# Patient Record
Sex: MALE | Race: WHITE | NOT HISPANIC OR LATINO | Employment: UNEMPLOYED | URBAN - METROPOLITAN AREA
[De-identification: names, ages, dates, MRNs, and addresses within clinical notes are randomized per-mention and may not be internally consistent; named-entity substitution may affect disease eponyms.]

---

## 2017-01-30 DIAGNOSIS — I72.8 CELIAC ARTERY ANEURYSM (HCC): ICD-10-CM

## 2017-01-31 ENCOUNTER — APPOINTMENT (OUTPATIENT)
Dept: MEDICAL GROUP | Age: 57
End: 2017-01-31
Payer: COMMERCIAL

## 2017-02-07 ENCOUNTER — APPOINTMENT (OUTPATIENT)
Dept: RADIOLOGY | Facility: MEDICAL CENTER | Age: 57
End: 2017-02-07
Attending: FAMILY MEDICINE
Payer: COMMERCIAL

## 2017-02-15 ENCOUNTER — SLEEP CENTER VISIT (OUTPATIENT)
Dept: SLEEP MEDICINE | Facility: MEDICAL CENTER | Age: 57
End: 2017-02-15
Payer: COMMERCIAL

## 2017-02-15 VITALS
BODY MASS INDEX: 39.48 KG/M2 | RESPIRATION RATE: 16 BRPM | DIASTOLIC BLOOD PRESSURE: 80 MMHG | WEIGHT: 282 LBS | SYSTOLIC BLOOD PRESSURE: 116 MMHG | HEIGHT: 71 IN | OXYGEN SATURATION: 94 % | HEART RATE: 72 BPM

## 2017-02-15 DIAGNOSIS — G47.33 OSA (OBSTRUCTIVE SLEEP APNEA): ICD-10-CM

## 2017-02-15 DIAGNOSIS — R63.8 INCREASED BMI: ICD-10-CM

## 2017-02-15 DIAGNOSIS — I10 ESSENTIAL HYPERTENSION: ICD-10-CM

## 2017-02-15 PROCEDURE — 99244 OFF/OP CNSLTJ NEW/EST MOD 40: CPT | Performed by: INTERNAL MEDICINE

## 2017-02-15 RX ORDER — ZOLPIDEM TARTRATE 5 MG/1
TABLET ORAL
Qty: 3 TAB | Refills: 0 | Status: SHIPPED | OUTPATIENT
Start: 2017-02-15 | End: 2017-07-21

## 2017-02-15 NOTE — MR AVS SNAPSHOT
"        Jhonatan Fonseca   2/15/2017 11:40 AM   Sleep Center Visit   MRN: [id number]    Department:  [facility]   Dept Phone:  [phone]    Description:  Male : 1960   Provider:  Elie Dunn M.D.           Allergies as of 2/15/2017     Allergen Noted Reactions    Lisinopril 2016   Hives      You were diagnosed with     DK (obstructive sleep apnea)   [969084]       Increased BMI   [420325]       Essential hypertension   [5510495]         Vital Signs     Blood Pressure Pulse Respirations Height Weight Body Mass Index    116/80 mmHg 72 16 1.791 m (5' 10.51\") 127.914 kg (282 lb) 39.88 kg/m2    Oxygen Saturation Smoking Status                94% Never Smoker           Basic Information     Date Of Birth Sex Race Ethnicity Preferred Language    1960 Male Unable to Obtain Unknown English      Your appointments     2017  3:30 PM   MR RICHTER WITH/WO with ROLON MRI 1   IMAGING ROLON [facility])    [id number] [id number]   [phone]            Mar 31, 2017  8:10 PM   Sleep Study Diagnostic with SLEEP TECH   [facility] (--)    [address]  [employer] [place] [address] [phone]            2017 11:40 AM   Follow UP with AKOSUA Wayne   [facility] (--)    [address]  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      Below and/or attached are the medications your provider expects you to take. Review all of your home medications and newly ordered medications with your provider and/or pharmacist. Follow medication instructions as directed by your provider and/or pharmacist. Please keep your medication list with you and share with your provider. Update the information when medications are discontinued, doses are changed, or new medications (including over-the-counter products) are added; and carry medication information at all times in the event of emergency situations     Allergies:  LISINOPRIL - Hives               Medications  Valid as of: February 15, 2017 - 12:15 PM    Generic Name Brand Name Tablet Size Instructions for use    Allopurinol (Tab) ZYLOPRIM 300 MG Take 300 mg by mouth every day.        Allopurinol (Tab) ZYLOPRIM 300 MG TAKE 1 TABLET BY MOUTH  EVERY DAY        AmLODIPine Besylate (Tab) NORVASC 5 MG Take 5 mg by mouth every day.        AmLODIPine Besylate (Tab) NORVASC 5 MG TAKE 1 TABLET BY MOUTH  EVERY DAY .        Aspirin (Tablet Delayed Response) ECOTRIN 81 MG Take 81 mg by mouth every day.        Aspirin (Tablet Delayed Response) aspirin 81 MG Take 81 mg by mouth every day.        Atorvastatin Calcium (Tab) LIPITOR 20 MG Take 20 mg by mouth every evening.        Bisoprolol Fumarate (Tab) ZEBETA 5 MG Take 5 mg by mouth every day.        Bisoprolol Fumarate (Tab) ZEBETA 5 MG Take 1 tablet by mouth  every day        Fluticasone Propionate (Suspension) FLONASE 50 MCG/ACT Spray 50 Sprays in nose as needed.        Multiple Vitamins-Minerals (Tab) THERAGRAN-M  Take 1 Tab by mouth every day.        Omega-3 Fatty Acids (Cap) fish oil 1000 MG Take 1,000 mg by mouth 3 times a day, with meals.        Oxycodone-Acetaminophen (Tab)  PERCOCET 5-325 MG Take 1-2 Tabs by mouth every four hours as needed.        Zolpidem Tartrate (Tab) AMBIEN 5 MG Take 1-2 tablets by mouth every evening as needed for insomnia. Bring to sleep study.        .                 Medicines prescribed today were sent to:     [facility] - [address]    [address] [facility] [address] [facility] [address]    Phone: [phone] Fax: [phone]    Open 24 Hours?: No    [id number] [place], [place] - [address]    [address] [place] [address]    Phone: [phone] Fax: [phone]    Open 24 Hours?: No      Medication refill instructions:       If your prescription bottle indicates you have medication refills left, it is not necessary to call your provider’s office. Please contact your pharmacy and they will refill your medication.    If your prescription bottle indicates you do not have any refills left, you may request refills at any time through one of the following ways: The online [employer] system (except Urgent Care), by calling your provider’s office, or by asking your pharmacy to contact your provider’s office with a refill request. Medication refills are processed only during regular business hours and may not be available until the next business day. Your provider may request additional information or to have a follow-up visit with you prior to refilling your medication.   *Please Note: Medication refills are assigned a new Rx number when refilled electronically. Your pharmacy may indicate that no refills were authorized even though a new prescription for the same medication is available at the pharmacy. Please request the medicine by name with the pharmacy before contacting your provider for a refill.        Your To Do List     Future Labs/Procedures Complete By Expires    POLYSOMNOGRAPHY, 4 OR MORE  As directed 2/15/2018         N[employer] Access Code: Activation code not generated  Current [employer]ality Status:  Active

## 2017-02-15 NOTE — PROGRESS NOTES
CC: Reevaluation of sleep apnea     HPI:  56-year-old male kindly referred by  for re-evaluation of DK. Has a home sleep study done in [place] 5 years ago which showed and ahi of > 50. Reportedly, couldn't afford the cpap and never used it according to accompanying records.  Moved here in May and needs to get re-established.    Sx include snoring, sleeps separately from wife and sleeps in a recliner and does better than in bed, nocturia, gasping, apneas, tiredness, eds, and fatigue.              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   • Bilateral hearing loss 09/19/2016   • BMI 39.0-39.9,adult 09/19/2016       Past Medical History   Diagnosis Date   • Celiac artery aneurysm (CMS-HCC)    • Sleep apnea    • Hypertension    • Kidney stone    • Chickenpox         Past Surgical History   Procedure Laterality Date   • Lithotripsy     • Knee arthroscopy       repair micscus tear       Family History   Problem Relation Age of Onset   • Cancer Brother      parathyroid   • Diabetes Brother        Social History     Social History   • Marital Status:      Spouse Name: N/A   • Number of Children: N/A   • Years of Education: N/A     Occupational History   • Not on file.     Social History Main Topics   • Smoking status: Never Smoker    • Smokeless tobacco: Never Used   • Alcohol Use: 0.0 oz/week     0 Standard drinks or equivalent per week      Comment: very occasionally   • Drug Use: No   • Sexual Activity:     Partners: Female     Other Topics Concern   • Not on file     Social History Narrative       Current Outpatient Prescriptions   Medication Sig Dispense Refill   • amlodipine (NORVASC) 5 MG Tab TAKE 1 TABLET BY MOUTH  EVERY DAY . 90 Tab 0   •  "allopurinol (ZYLOPRIM) 300 MG Tab TAKE 1 TABLET BY MOUTH  EVERY DAY 90 Tab 0   • bisoprolol (ZEBETA) 5 MG Tab Take 1 tablet by mouth  every day 90 Tab 0   • fluticasone (FLONASE) 50 MCG/ACT nasal spray Spray 50 Sprays in nose as needed.     • aspirin (ASPIR-LOW) 81 MG EC tablet Take 81 mg by mouth every day.     • Omega-3 Fatty Acids (FISH OIL) 1000 MG Cap capsule Take 1,000 mg by mouth 3 times a day, with meals.     • therapeutic multivitamin-minerals (THERAGRAN-M) Tab Take 1 Tab by mouth every day.     • atorvastatin (LIPITOR) 20 MG Tab Take 20 mg by mouth every evening.     • oxycodone-acetaminophen (PERCOCET) 5-325 MG Tab Take 1-2 Tabs by mouth every four hours as needed.     • aspirin EC (ECOTRIN) 81 MG Tablet Delayed Response Take 81 mg by mouth every day.     • allopurinol (ZYLOPRIM) 300 MG Tab Take 300 mg by mouth every day.     • amlodipine (NORVASC) 5 MG Tab Take 5 mg by mouth every day.     • bisoprolol (ZEBETA) 5 MG Tab Take 5 mg by mouth every day.       No current facility-administered medications for this visit.    \"CURRENT RX\"    ALLERGIES: Lisinopril    ROS  Constitutional: Denies fever, chills, sweats, fatigue, weight loss.   Eyes: Denies  vision loss, pain, drainage, double vision.   Ears/Nose/Mouth/Throat: Denies earache, difficulty hearing, ringing/buzzing in ears, rhinitis/nasal congestion, injury, recurrent sore throat, persistent hoarseness, decayed teeth/toothaches.   Cardiovascular: Denies chest pain, tightness, palpitations, swelling in legs/feet, fainting, difficulty breathing when lying down but gets better when sitting up.   Respiratory: Denies shortness of breath, cough, sputum, wheezing, painful breathing, coughing up blood.   Sleep: see HPI  Gastrointestinal: Denies heartburn, difficulty swallowing, nausea, abdominal pain, diarrhea, constipation.   Genitourinary: Denies frequent urination, painful urination, blood in urine, discharge.   Musculoskeletal: Denies painful joints, sore " "muscles, back pain.   Integumentary: Denies rashes, lumps, color changes.   Neurological: Denies frequent headaches, dizziness, weakness    PHYSICAL EXAM  MSEW   /80 mmHg  Pulse 72  Resp 16  Ht 1.791 m (5' 10.51\")  Wt 127.914 kg (282 lb)  BMI 39.88 kg/m2  SpO2 94%  Appearance: Well-nourished, well-developed, no acute distress  Eyes:  PERRLA, EOMI; glasses  Hearing:  Grossly intact  Nose:  Normal, no lesions or deformities, turbinates moist  Oropharynx:  Tongue normal, posterior pharynx without erythema or exudate  Mallampati classification:    Neck: Supple, trachea midline, no masses  Respiratory effort:  No intercostal retractions or use of accessory muscles  Lung auscultation:  No wheezes rhonchi rubs or rales  Cardiac auscultation:  No murmurs, rubs, or gallops, no regular rhythm, normal rate  Abdomen:  No tenderness, no organomegaly  Extremities:  No cyanosis, clubbing; 2+ edema  Gait and Station:  Normal  Digits and nails: No clubbing, cyanosis, petechiae, or nodes  Musculoskeletal:  Grossly normal  Skin:  No rashes  Orientation:  Oriented time, place, and person  Mood and affect:  No depression, anxiety, agitation  Judgment:  Intact    PROBLEMS:  1. DK (obstructive sleep apnea)    - POLYSOMNOGRAPHY, 4 OR MORE; Future  - zolpidem (AMBIEN) 5 MG Tab; Take 1-2 tablets by mouth every evening as needed for insomnia. Bring to sleep study.  Dispense: 3 Tab; Refill: 0    2. Increased BMI      3. Essential hypertension        PLAN:   The patient has signs and symptoms consistent with obstructive sleep apnea hypopnea syndrome. A prior study showed significant DK. He needs to be re-qualified. Will schedule to have a nocturnal polysomnogram using zolpidem to assist with sleep onset and maintenance should the need arise. Will return after the results are available to determine further diagnostic needs and/or treatment options.    The risks of untreated sleep apnea were discussed with the patient at length. " Patients with DK are at increased risk of cardiovascular disease including coronary artery disease, systemic arterial hypertension, pulmonary arterial hypertension, cardiac arrythmias, and stroke. DK patients have an increased risk of motor vehicle accidents, type 2 diabetes, chronic kidney disease, and non-alcoholic liver disease. The patient was advised to avoid driving a motor vehicle when drowsy.       RTC after PSG.

## 2017-02-22 ENCOUNTER — PATIENT MESSAGE (OUTPATIENT)
Dept: MEDICAL GROUP | Age: 57
End: 2017-02-22

## 2017-02-22 RX ORDER — FLUTICASONE PROPIONATE 50 MCG
50 SPRAY, SUSPENSION (ML) NASAL PRN
Qty: 16 G | Refills: 0 | Status: SHIPPED | OUTPATIENT
Start: 2017-02-22 | End: 2018-10-29

## 2017-02-22 RX ORDER — FLUTICASONE PROPIONATE 50 MCG
1 SPRAY, SUSPENSION (ML) NASAL DAILY
Qty: 16 G | Refills: 2 | Status: SHIPPED | OUTPATIENT
Start: 2017-02-22 | End: 2017-11-17 | Stop reason: SDUPTHER

## 2017-03-29 ENCOUNTER — TELEPHONE (OUTPATIENT)
Dept: SLEEP MEDICINE | Facility: MEDICAL CENTER | Age: 57
End: 2017-03-29

## 2017-03-29 DIAGNOSIS — G47.33 OSA (OBSTRUCTIVE SLEEP APNEA): ICD-10-CM

## 2017-03-29 NOTE — TELEPHONE ENCOUNTER
In lab study denied, schedule peer to peer or would you like to sign HST order?? Let mw know and I'll call pt. Thanks!!    MRN [id number]/ Jhonatan Fonseca/ [facility] has denied in lab sleep study.  Would you like to switch to a HST or do a Peer to Peer Review    Peer to Peer [phone]  Ref # [id number]

## 2017-04-04 RX ORDER — BISOPROLOL FUMARATE 5 MG/1
TABLET, FILM COATED ORAL
Qty: 90 TAB | Refills: 0 | Status: SHIPPED | OUTPATIENT
Start: 2017-04-04 | End: 2017-08-20 | Stop reason: SDUPTHER

## 2017-04-04 RX ORDER — ATORVASTATIN CALCIUM 20 MG/1
TABLET, FILM COATED ORAL
Qty: 90 TAB | Refills: 0 | Status: SHIPPED | OUTPATIENT
Start: 2017-04-04 | End: 2018-11-26

## 2017-04-05 RX ORDER — AMLODIPINE BESYLATE 5 MG/1
TABLET ORAL
Qty: 90 TAB | Refills: 0 | OUTPATIENT
Start: 2017-04-05

## 2017-04-05 RX ORDER — ALLOPURINOL 300 MG/1
TABLET ORAL
Qty: 90 TAB | Refills: 0 | OUTPATIENT
Start: 2017-04-05

## 2017-04-07 ENCOUNTER — TELEPHONE (OUTPATIENT)
Dept: MEDICAL GROUP | Age: 57
End: 2017-04-07

## 2017-04-07 DIAGNOSIS — L29.9 ITCHY SKIN: ICD-10-CM

## 2017-04-07 NOTE — TELEPHONE ENCOUNTER
1. Caller Name: Jhonatan Fonseca                                          Call Back Number: [phone] (home) [phone] (work)      Patient approves a detailed voicemail message: yes    2. SPECIFIC Action To Be Taken: Referral pending, please sign.    3. Diagnosis/Clinical Reason for Request: moles and itchy skin around shoulders and waist.    4. Specialty & Provider Name/Lab/Imaging Location: NA    5. Is appointment scheduled for requested order/referral: no    Patient informed they will receive a return phone call from the office ONLY if there are any questions before processing their request. Advised to call back if they haven't received a call from the referral department in 5 days.

## 2017-04-07 NOTE — TELEPHONE ENCOUNTER
----- Message from Your Healthcare Team sent at 4/7/2017  8:20 AM PDT -----  Regarding: Non-Urgent Medical Question  Contact: [phone]  Alfredo Newell    1. Can you please approve my RX renewals, preferably for at least a year or longer;    2. Can I get a referral to a Dermatologist to look at some skin irritations and wagner?    Thanks!    Alan Fonseca

## 2017-04-10 RX ORDER — ALLOPURINOL 300 MG/1
TABLET ORAL
Qty: 90 TAB | Refills: 0 | Status: SHIPPED | OUTPATIENT
Start: 2017-04-10 | End: 2017-06-27 | Stop reason: SDUPTHER

## 2017-04-10 RX ORDER — ATORVASTATIN CALCIUM 20 MG/1
TABLET, FILM COATED ORAL
OUTPATIENT
Start: 2017-04-10

## 2017-04-10 RX ORDER — AMLODIPINE BESYLATE 5 MG/1
TABLET ORAL
Qty: 90 TAB | Refills: 0 | Status: SHIPPED | OUTPATIENT
Start: 2017-04-10 | End: 2017-06-27 | Stop reason: SDUPTHER

## 2017-04-10 NOTE — TELEPHONE ENCOUNTER
Phone Number Called: [phone] (home) [phone] (work)    Message: Spoke with patient and relayed message.    Left Message for patient to call back: N\A

## 2017-05-01 ENCOUNTER — HOME STUDY (OUTPATIENT)
Dept: SLEEP MEDICINE | Facility: MEDICAL CENTER | Age: 57
End: 2017-05-01
Attending: NURSE PRACTITIONER
Payer: COMMERCIAL

## 2017-05-01 DIAGNOSIS — G47.33 OSA (OBSTRUCTIVE SLEEP APNEA): ICD-10-CM

## 2017-05-01 PROCEDURE — 95806 SLEEP STUDY UNATT&RESP EFFT: CPT | Performed by: INTERNAL MEDICINE

## 2017-05-01 NOTE — MR AVS SNAPSHOT
Jhonatan Fonseca   2017 2:10 PM   Appointment   MRN: [id number]    Department:  [facility]   Dept Phone:  [phone]    Description:  Male : 1960   Provider:  [facility]           Allergies as of 2017     Allergen Noted Reactions    Lisinopril 2016   Hives      You were diagnosed with     DK (obstructive sleep apnea)   [856064]         Vital Signs     Smoking Status                   Never Smoker            Basic Information     Date Of Birth Sex Race Ethnicity Preferred Language    1960 Male Unable to Obtain Unknown English      Your appointments     May 15, 2017 11:40 AM   Follow UP with AKOSUA Wayne   [facility] (--)    [address]  [place] [id number] [phone]              Problem List              ICD-10-CM Priority Class Noted - Resolved    Essential hypertension I10   2016 - Present    DK (obstructive sleep apnea) G47.33   2016 - Present    Pure hypercholesterolemia E78.00   2016 - Present    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      Below and/or attached are the medications your provider expects you to take. Review all of your home medications and newly ordered medications with your provider and/or pharmacist. Follow medication instructions as directed by your provider and/or pharmacist. Please keep your medication list with you and share with your provider. Update the information when medications are discontinued, doses are changed, or new medications  (including over-the-counter products) are added; and carry medication information at all times in the event of emergency situations     Allergies:  LISINOPRIL - Hives               Medications  Valid as of: May 01, 2017 -  2:19 PM    Generic Name Brand Name Tablet Size Instructions for use    Allopurinol (Tab) ZYLOPRIM 300 MG Take 300 mg by mouth every day.        Allopurinol (Tab) ZYLOPRIM 300 MG TAKE 1 TABLET BY MOUTH  EVERY DAY        Allopurinol (Tab) ZYLOPRIM 300 MG Take 1 tablet by mouth  every day        Allopurinol (Tab) ZYLOPRIM 300 MG Take 1 tablet by mouth  every day        AmLODIPine Besylate (Tab) NORVASC 5 MG Take 5 mg by mouth every day.        AmLODIPine Besylate (Tab) NORVASC 5 MG TAKE 1 TABLET BY MOUTH  EVERY DAY .        AmLODIPine Besylate (Tab) NORVASC 5 MG Take 1 tablet by mouth  every day        AmLODIPine Besylate (Tab) NORVASC 5 MG Take 1 tablet by mouth  every day        Aspirin (Tablet Delayed Response) ECOTRIN 81 MG Take 81 mg by mouth every day.        Atorvastatin Calcium (Tab) LIPITOR 20 MG Take 20 mg by mouth every evening.        Atorvastatin Calcium (Tab) LIPITOR 20 MG TAKE 1 TAB BY MOUTH EVERY  DAY        Bisoprolol Fumarate (Tab) ZEBETA 5 MG Take 5 mg by mouth every day.        Bisoprolol Fumarate (Tab) ZEBETA 5 MG Take 1 tablet by mouth  every day        Fluticasone Propionate (Suspension) FLONASE 50 MCG/ACT Spray 50 Sprays in nose as needed.        Fluticasone Propionate (Suspension) FLONASE 50 MCG/ACT Spray 1 Spray in nose every day.        Multiple Vitamins-Minerals (Tab) THERAGRAN-M  Take 1 Tab by mouth every day.        Omega-3 Fatty Acids (Cap) fish oil 1000 MG Take 1,000 mg by mouth 3 times a day, with meals.        Oxycodone-Acetaminophen (Tab) PERCOCET 5-325 MG Take 1-2 Tabs by mouth every four hours as needed.        Zolpidem Tartrate (Tab) AMBIEN 5 MG Take 1-2 tablets by mouth every evening as needed for insomnia. Bring to sleep study.        .                    Medicines prescribed today were sent to:     [facility] - [address]    [address] [facility] [address] [facility] [address]    Phone: [phone] Fax: [phone]    Open 24 Hours?: No    [id number] - [place], [place] - [address]    [address] [place] [address]    Phone: [phone] Fax: [phone]    Open 24 Hours?: No      Medication refill instructions:       If your prescription bottle indicates you have medication refills left, it is not necessary to call your provider’s office. Please contact your pharmacy and they will refill your medication.    If your prescription bottle indicates you do not have any refills left, you may request refills at any time through one of the following ways: The online [employer] system (except Urgent Care), by calling your provider’s office, or by asking your pharmacy to contact your provider’s office with a refill request. Medication refills are processed only during regular business hours and may not be available until the next business day. Your provider may request additional information or to have a follow-up visit with you prior to refilling your medication.   *Please Note: Medication refills are assigned a new Rx number when refilled electronically. Your pharmacy may indicate that no refills were authorized even though a new prescription for the same medication is available at the pharmacy. Please request the medicine by name with the pharmacy before contacting your provider for a refill.           [employer] Access Code: Activation code not generated  Current [employer] Status: Active

## 2017-05-03 ENCOUNTER — TELEPHONE (OUTPATIENT)
Dept: PULMONOLOGY | Facility: HOSPICE | Age: 57
End: 2017-05-03

## 2017-05-03 DIAGNOSIS — G47.33 OSA (OBSTRUCTIVE SLEEP APNEA): ICD-10-CM

## 2017-05-03 NOTE — TELEPHONE ENCOUNTER
Please let patient know that they have severe sleep and need a second sleep study to be tested on cpap machine. Order signed. Follow up for results.

## 2017-05-03 NOTE — PROCEDURES
Interpretation:    This home sleep study was recorded on 5/1/2017. The duration was 6 hours and 18 minutes, the flow evaluation duration was 6 hours and 5 minutes, and the oxygen saturation evaluation duration was 4 hours and 42 minutes.    Severe obstructive sleep apnea hypopnea was found. The apnea hypopnea index was 61.5 and the supine index was 72.7. The central apnea index was 7.4. Most events were hypopneas. The oxygen desaturation index was 64.9. The average saturation was 86% and the minimum saturation was 68%. Saturations were less than or equal to 88% for 3 hours and 34 minutes. Heart rate varied between 46 and 96 with an average of 74 bpm. The patient slept mainly in the supine position and on his right side. Snoring was noted throughout the recording.    Recommendation:    Recommend a positive airway pressure titration.

## 2017-05-15 ENCOUNTER — SLEEP CENTER VISIT (OUTPATIENT)
Dept: SLEEP MEDICINE | Facility: MEDICAL CENTER | Age: 57
End: 2017-05-15
Payer: COMMERCIAL

## 2017-05-15 VITALS
WEIGHT: 282 LBS | SYSTOLIC BLOOD PRESSURE: 130 MMHG | HEART RATE: 75 BPM | HEIGHT: 70 IN | DIASTOLIC BLOOD PRESSURE: 90 MMHG | BODY MASS INDEX: 40.37 KG/M2 | RESPIRATION RATE: 15 BRPM

## 2017-05-15 DIAGNOSIS — G47.33 OSA (OBSTRUCTIVE SLEEP APNEA): ICD-10-CM

## 2017-05-15 PROCEDURE — 99213 OFFICE O/P EST LOW 20 MIN: CPT | Performed by: NURSE PRACTITIONER

## 2017-05-15 NOTE — PROGRESS NOTES
Chief Complaint   Patient presents with   • Results     HSS Results          HPI: This patient is a 56 y.o. male, who presents for patient was seen in February for consultation and evaluation of suspected sleep-disordered breathing. Symptoms include resuscitative cast, witnessed apneas, daytime hypersomnolence, frequent nocturnal awakenings. In lab study was declined by insurance. Home sleep study indicates severe sleep apnea with an AHI of 61.5, supine index 72.7, minimum saturation 68%. Patient did have some element of central apneas. For this reason in lab titration is recommended. Testing reviewed in detail with the patient.    Past Medical History   Diagnosis Date   • Celiac artery aneurysm (CMS-HCC)    • Sleep apnea    • Hypertension    • Kidney stone    • Chickenpox        Social History   Substance Use Topics   • Smoking status: Never Smoker    • Smokeless tobacco: Never Used   • Alcohol Use: 0.0 oz/week     0 Standard drinks or equivalent per week      Comment: very occasionally       Family History   Problem Relation Age of Onset   • Cancer Brother      parathyroid   • Diabetes Brother        Current medications as of today   Current Outpatient Prescriptions   Medication Sig Dispense Refill   • atorvastatin (LIPITOR) 20 MG Tab TAKE 1 TAB BY MOUTH EVERY  DAY 90 Tab 0   • bisoprolol (ZEBETA) 5 MG Tab Take 1 tablet by mouth  every day 90 Tab 0   • fluticasone (FLONASE) 50 MCG/ACT nasal spray Spray 50 Sprays in nose as needed. 16 g 0   • amlodipine (NORVASC) 5 MG Tab TAKE 1 TABLET BY MOUTH  EVERY DAY . 90 Tab 0   • allopurinol (ZYLOPRIM) 300 MG Tab TAKE 1 TABLET BY MOUTH  EVERY DAY 90 Tab 0   • Omega-3 Fatty Acids (FISH OIL) 1000 MG Cap capsule Take 1,000 mg by mouth 3 times a day, with meals.     • therapeutic multivitamin-minerals (THERAGRAN-M) Tab Take 1 Tab by mouth every day.     • aspirin EC (ECOTRIN) 81 MG Tablet Delayed Response Take 81 mg by mouth every day.     • amlodipine (NORVASC) 5 MG Tab Take 1  "tablet by mouth  every day 90 Tab 0   • allopurinol (ZYLOPRIM) 300 MG Tab Take 1 tablet by mouth  every day 90 Tab 0   • allopurinol (ZYLOPRIM) 300 MG Tab Take 1 tablet by mouth  every day 90 Tab 0   • amlodipine (NORVASC) 5 MG Tab Take 1 tablet by mouth  every day 90 Tab 0   • fluticasone (FLONASE) 50 MCG/ACT nasal spray Spray 1 Spray in nose every day. 16 g 2   • zolpidem (AMBIEN) 5 MG Tab Take 1-2 tablets by mouth every evening as needed for insomnia. Bring to sleep study. 3 Tab 0   • oxycodone-acetaminophen (PERCOCET) 5-325 MG Tab Take 1-2 Tabs by mouth every four hours as needed.     • allopurinol (ZYLOPRIM) 300 MG Tab Take 300 mg by mouth every day.     • amlodipine (NORVASC) 5 MG Tab Take 5 mg by mouth every day.     • atorvastatin (LIPITOR) 20 MG Tab Take 20 mg by mouth every evening.     • bisoprolol (ZEBETA) 5 MG Tab Take 5 mg by mouth every day.       No current facility-administered medications for this visit.       Allergies: Lisinopril    Blood pressure 130/90, pulse 75, resp. rate 15, height 1.778 m (5' 10\"), weight 127.914 kg (282 lb).      ROS:   Constitutional: Denies fevers, chills, night sweats, weight loss. Positive for fatigue.  HEENT: Denies earache, difficulty hearing, tinnitus, nasal congestion, hoarseness  Cardiovascular: Denies chest pain, tightness, palpitations, orthopnea or edema  Respiratory: Denies cough, wheeze, dyspnea, hemoptysis  Sleep: See HPI  GI: Denies heartburn, dysphagia, nausea, abdominal pain, diarrhea or constipation  : Denies frequent urination, hematuria, discharge or painful urination  Musculoskeletal: Denies back pain, painful joints, sore muscles  Neurological: Denies weakness or headaches  Skin: No rashes    Physical exam:   Appearance: Obese, in no acute distress  HEENT: Normocephalic, atraumatic, white sclera, PERRLA  Respiratory: no intercostal retractions or accessory muscle use   Gait: Normal  Digits: No clubbing, cyanosis  Motor: No focal " deficits  Orientation: Oriented to time, person and place    Diagnosis:  1. DK (obstructive sleep apnea)  POLYSOMNOGRAPHY TITRATION    DME CPAP   2. BMI 40.0-44.9, adult (CMS-[facility])         Plan:  Patient is upset at the length of time it is taken to be evaluated for apnea. He would like to start trial of auto CPAP. He understands that this may not completely correct his apnea as he does have a central component. In lab titration will be scheduled. We'll see him back in 8 weeks after initiating auto CPAP. If AHI remains elevated he will require an in lab study.

## 2017-05-15 NOTE — MR AVS SNAPSHOT
"        Jhonatan Fonseca   5/15/2017 11:40 AM   Sleep Center Visit   MRN: [id number]    Department:  [facility]   Dept Phone:  [phone]    Description:  Male : 1960   Provider:  AKOSUA Wayne           Reason for Visit     Results HSS Results       Allergies as of 5/15/2017     Allergen Noted Reactions    Lisinopril 2016   Hives      You were diagnosed with     DK (obstructive sleep apnea)   [375116]         Vital Signs     Blood Pressure Pulse Respirations Height Weight Body Mass Index    130/90 mmHg 75 15 1.778 m (5' 10\") 127.914 kg (282 lb) 40.46 kg/m2    Smoking Status                   Never Smoker            Basic Information     Date Of Birth Sex Race Ethnicity Preferred Language    1960 Male Unable to Obtain Unknown English      Your appointments     2017  7:05 PM   Sleep Study with SLEEP TECH   [facility] (--)    [address]  [place] [id number] [phone]            2017 11:40 AM   Follow UP with AKOSUA Wayne   [facility] (--)    [address]  [place] [id number] [phone]              Problem List              ICD-10-CM Priority Class Noted - Resolved    Essential hypertension I10   2016 - Present    DK (obstructive sleep apnea) G47.33   2016 - Present    Pure hypercholesterolemia E78.00   2016 - Present    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      Below and/or attached are the medications your " provider expects you to take. Review all of your home medications and newly ordered medications with your provider and/or pharmacist. Follow medication instructions as directed by your provider and/or pharmacist. Please keep your medication list with you and share with your provider. Update the information when medications are discontinued, doses are changed, or new medications (including over-the-counter products) are added; and carry medication information at all times in the event of emergency situations     Allergies:  LISINOPRIL - Hives               Medications  Valid as of: May 15, 2017 - 12:17 PM    Generic Name Brand Name Tablet Size Instructions for use    Allopurinol (Tab) ZYLOPRIM 300 MG Take 300 mg by mouth every day.        Allopurinol (Tab) ZYLOPRIM 300 MG TAKE 1 TABLET BY MOUTH  EVERY DAY        Allopurinol (Tab) ZYLOPRIM 300 MG Take 1 tablet by mouth  every day        Allopurinol (Tab) ZYLOPRIM 300 MG Take 1 tablet by mouth  every day        AmLODIPine Besylate (Tab) NORVASC 5 MG Take 5 mg by mouth every day.        AmLODIPine Besylate (Tab) NORVASC 5 MG TAKE 1 TABLET BY MOUTH  EVERY DAY .        AmLODIPine Besylate (Tab) NORVASC 5 MG Take 1 tablet by mouth  every day        AmLODIPine Besylate (Tab) NORVASC 5 MG Take 1 tablet by mouth  every day        Aspirin (Tablet Delayed Response) ECOTRIN 81 MG Take 81 mg by mouth every day.        Atorvastatin Calcium (Tab) LIPITOR 20 MG Take 20 mg by mouth every evening.        Atorvastatin Calcium (Tab) LIPITOR 20 MG TAKE 1 TAB BY MOUTH EVERY  DAY        Bisoprolol Fumarate (Tab) ZEBETA 5 MG Take 5 mg by mouth every day.        Bisoprolol Fumarate (Tab) ZEBETA 5 MG Take 1 tablet by mouth  every day        Fluticasone Propionate (Suspension) FLONASE 50 MCG/ACT Spray 50 Sprays in nose as needed.        Fluticasone Propionate (Suspension) FLONASE 50 MCG/ACT Spray 1 Spray in nose every day.        Multiple Vitamins-Minerals (Tab) THERAGRAN-M  Take 1 Tab by  mouth every day.        Omega-3 Fatty Acids (Cap) fish oil 1000 MG Take 1,000 mg by mouth 3 times a day, with meals.        Oxycodone-Acetaminophen (Tab) PERCOCET 5-325 MG Take 1-2 Tabs by mouth every four hours as needed.        Zolpidem Tartrate (Tab) AMBIEN 5 MG Take 1-2 tablets by mouth every evening as needed for insomnia. Bring to sleep study.        .                 Medicines prescribed today were sent to:     [facility] - [address] [facility] [address]    Phone: [phone] Fax: [phone]    Open 24 Hours?: No    [id number] - [place], [place] - [address]    [address] [place] [address]    Phone: [phone] Fax: [phone]    Open 24 Hours?: No      Medication refill instructions:       If your prescription bottle indicates you have medication refills left, it is not necessary to call your provider’s office. Please contact your pharmacy and they will refill your medication.    If your prescription bottle indicates you do not have any refills left, you may request refills at any time through one of the following ways: The online [employer] system (except Urgent Care), by calling your provider’s office, or by asking your pharmacy to contact your provider’s office with a refill request. Medication refills are processed only during regular business hours and may not be available until the next business day. Your provider may request additional information or to have a follow-up visit with you prior to refilling your medication.   *Please Note: Medication refills are assigned a new Rx number when refilled electronically. Your pharmacy may indicate that no refills were authorized even though a new prescription for the same medication is available at the pharmacy. Please request the medicine by name with the pharmacy before contacting your provider for a refill.        Your To Do List     Future Labs/Procedures Complete  By Expires    POLYSOMNOGRAPHY TITRATION  As directed 5/15/2018         MyChart Access Code: Activation code not generated  Current [employer] Status: Active

## 2017-06-27 RX ORDER — ATORVASTATIN CALCIUM 20 MG/1
TABLET, FILM COATED ORAL
Qty: 90 TAB | Refills: 0 | Status: SHIPPED | OUTPATIENT
Start: 2017-06-27 | End: 2018-11-26

## 2017-06-27 RX ORDER — AMLODIPINE BESYLATE 5 MG/1
TABLET ORAL
Qty: 90 TAB | Refills: 0 | Status: SHIPPED | OUTPATIENT
Start: 2017-06-27 | End: 2017-09-07 | Stop reason: SDUPTHER

## 2017-06-27 RX ORDER — ALLOPURINOL 300 MG/1
TABLET ORAL
Qty: 90 TAB | Refills: 0 | Status: SHIPPED | OUTPATIENT
Start: 2017-06-27 | End: 2017-09-07 | Stop reason: SDUPTHER

## 2017-06-28 ENCOUNTER — PATIENT MESSAGE (OUTPATIENT)
Dept: MEDICAL GROUP | Age: 57
End: 2017-06-28

## 2017-07-06 ENCOUNTER — TELEPHONE (OUTPATIENT)
Dept: SLEEP MEDICINE | Facility: MEDICAL CENTER | Age: 57
End: 2017-07-06

## 2017-07-06 DIAGNOSIS — G47.33 OSA (OBSTRUCTIVE SLEEP APNEA): ICD-10-CM

## 2017-07-06 NOTE — TELEPHONE ENCOUNTER
[facility] denied in lab study due to lack of significant co-morbid conditions.  He can start Auto CPAP unless you would like to complete a [id number].  Ref # [id number]  phone [phone]    Sleep Study Appt 07/08/17

## 2017-07-07 NOTE — TELEPHONE ENCOUNTER
Called patient.  He already canceled his titration study and states he has already been on Auto CPAP therapy for 30 days as of 07/10/17.  Suggested he keep his 07/14/17 appointment, so we can evaluate the efficacy of therapy.  He will bring his compliance card to the visit.  Order for APAP canceled.

## 2017-07-11 ENCOUNTER — OFFICE VISIT (OUTPATIENT)
Dept: MEDICAL GROUP | Age: 57
End: 2017-07-11
Payer: COMMERCIAL

## 2017-07-11 VITALS
HEART RATE: 68 BPM | DIASTOLIC BLOOD PRESSURE: 80 MMHG | WEIGHT: 280 LBS | OXYGEN SATURATION: 94 % | TEMPERATURE: 98.2 F | BODY MASS INDEX: 40.09 KG/M2 | SYSTOLIC BLOOD PRESSURE: 130 MMHG | HEIGHT: 70 IN

## 2017-07-11 DIAGNOSIS — E66.01 OBESITY, MORBID, BMI 40.0-49.9 (HCC): ICD-10-CM

## 2017-07-11 DIAGNOSIS — I10 ESSENTIAL HYPERTENSION: ICD-10-CM

## 2017-07-11 DIAGNOSIS — G47.33 OSA (OBSTRUCTIVE SLEEP APNEA): ICD-10-CM

## 2017-07-11 DIAGNOSIS — I72.8 CELIAC ARTERY ANEURYSM (HCC): ICD-10-CM

## 2017-07-11 DIAGNOSIS — R35.1 NOCTURIA: ICD-10-CM

## 2017-07-11 DIAGNOSIS — L57.0 AK (ACTINIC KERATOSIS): ICD-10-CM

## 2017-07-11 DIAGNOSIS — M65.312 TRIGGER FINGER OF LEFT THUMB: ICD-10-CM

## 2017-07-11 DIAGNOSIS — Z23 NEED FOR VACCINATION: ICD-10-CM

## 2017-07-11 DIAGNOSIS — G89.29 CHRONIC PAIN OF LEFT KNEE: ICD-10-CM

## 2017-07-11 DIAGNOSIS — R53.82 CHRONIC FATIGUE: ICD-10-CM

## 2017-07-11 DIAGNOSIS — L82.0 INFLAMED SEBORRHEIC KERATOSIS: ICD-10-CM

## 2017-07-11 DIAGNOSIS — M25.562 CHRONIC PAIN OF LEFT KNEE: ICD-10-CM

## 2017-07-11 PROCEDURE — 17003 DESTRUCT PREMALG LES 2-14: CPT | Performed by: FAMILY MEDICINE

## 2017-07-11 PROCEDURE — 17000 DESTRUCT PREMALG LESION: CPT | Mod: 59 | Performed by: FAMILY MEDICINE

## 2017-07-11 PROCEDURE — 17110 DESTRUCTION B9 LES UP TO 14: CPT | Performed by: FAMILY MEDICINE

## 2017-07-11 PROCEDURE — 99214 OFFICE O/P EST MOD 30 MIN: CPT | Mod: 25 | Performed by: FAMILY MEDICINE

## 2017-07-12 ENCOUNTER — HOSPITAL ENCOUNTER (OUTPATIENT)
Dept: LAB | Facility: MEDICAL CENTER | Age: 57
End: 2017-07-12
Attending: FAMILY MEDICINE
Payer: COMMERCIAL

## 2017-07-12 DIAGNOSIS — I10 ESSENTIAL HYPERTENSION: ICD-10-CM

## 2017-07-12 DIAGNOSIS — R35.1 NOCTURIA: ICD-10-CM

## 2017-07-12 PROBLEM — E66.01 OBESITY, MORBID, BMI 40.0-49.9 (HCC): Status: ACTIVE | Noted: 2017-07-12

## 2017-07-12 LAB
ALBUMIN SERPL BCP-MCNC: 3.6 G/DL (ref 3.2–4.9)
ALBUMIN/GLOB SERPL: 1.1 G/DL
ALP SERPL-CCNC: 142 U/L (ref 30–99)
ALT SERPL-CCNC: 17 U/L (ref 2–50)
ANION GAP SERPL CALC-SCNC: 9 MMOL/L (ref 0–11.9)
AST SERPL-CCNC: 17 U/L (ref 12–45)
BASOPHILS # BLD AUTO: 0.7 % (ref 0–1.8)
BASOPHILS # BLD: 0.06 K/UL (ref 0–0.12)
BILIRUB SERPL-MCNC: 0.6 MG/DL (ref 0.1–1.5)
BUN SERPL-MCNC: 11 MG/DL (ref 8–22)
CALCIUM SERPL-MCNC: 9.8 MG/DL (ref 8.5–10.5)
CHLORIDE SERPL-SCNC: 106 MMOL/L (ref 96–112)
CHOLEST SERPL-MCNC: 137 MG/DL (ref 100–199)
CO2 SERPL-SCNC: 25 MMOL/L (ref 20–33)
CREAT SERPL-MCNC: 0.81 MG/DL (ref 0.5–1.4)
EOSINOPHIL # BLD AUTO: 0.13 K/UL (ref 0–0.51)
EOSINOPHIL NFR BLD: 1.6 % (ref 0–6.9)
ERYTHROCYTE [DISTWIDTH] IN BLOOD BY AUTOMATED COUNT: 47.2 FL (ref 35.9–50)
GFR SERPL CREATININE-BSD FRML MDRD: >60 ML/MIN/1.73 M 2
GLOBULIN SER CALC-MCNC: 3.4 G/DL (ref 1.9–3.5)
GLUCOSE SERPL-MCNC: 93 MG/DL (ref 65–99)
HCT VFR BLD AUTO: 42.8 % (ref 42–52)
HDLC SERPL-MCNC: 52 MG/DL
HGB BLD-MCNC: 13.8 G/DL (ref 14–18)
IMM GRANULOCYTES # BLD AUTO: 0.02 K/UL (ref 0–0.11)
IMM GRANULOCYTES NFR BLD AUTO: 0.2 % (ref 0–0.9)
LDLC SERPL CALC-MCNC: 72 MG/DL
LYMPHOCYTES # BLD AUTO: 2.01 K/UL (ref 1–4.8)
LYMPHOCYTES NFR BLD: 24.2 % (ref 22–41)
MCH RBC QN AUTO: 28.9 PG (ref 27–33)
MCHC RBC AUTO-ENTMCNC: 32.2 G/DL (ref 33.7–35.3)
MCV RBC AUTO: 89.5 FL (ref 81.4–97.8)
MONOCYTES # BLD AUTO: 0.98 K/UL (ref 0–0.85)
MONOCYTES NFR BLD AUTO: 11.8 % (ref 0–13.4)
NEUTROPHILS # BLD AUTO: 5.1 K/UL (ref 1.82–7.42)
NEUTROPHILS NFR BLD: 61.5 % (ref 44–72)
NRBC # BLD AUTO: 0 K/UL
NRBC BLD AUTO-RTO: 0 /100 WBC
PLATELET # BLD AUTO: 254 K/UL (ref 164–446)
PMV BLD AUTO: 11.4 FL (ref 9–12.9)
POTASSIUM SERPL-SCNC: 3.9 MMOL/L (ref 3.6–5.5)
PROT SERPL-MCNC: 7 G/DL (ref 6–8.2)
RBC # BLD AUTO: 4.78 M/UL (ref 4.7–6.1)
SODIUM SERPL-SCNC: 140 MMOL/L (ref 135–145)
TRIGL SERPL-MCNC: 66 MG/DL (ref 0–149)
WBC # BLD AUTO: 8.3 K/UL (ref 4.8–10.8)

## 2017-07-12 PROCEDURE — 84153 ASSAY OF PSA TOTAL: CPT

## 2017-07-12 PROCEDURE — 84403 ASSAY OF TOTAL TESTOSTERONE: CPT

## 2017-07-12 PROCEDURE — 36415 COLL VENOUS BLD VENIPUNCTURE: CPT

## 2017-07-12 PROCEDURE — 80061 LIPID PANEL: CPT

## 2017-07-12 PROCEDURE — 80053 COMPREHEN METABOLIC PANEL: CPT

## 2017-07-12 PROCEDURE — 85025 COMPLETE CBC W/AUTO DIFF WBC: CPT

## 2017-07-12 PROCEDURE — 84154 ASSAY OF PSA FREE: CPT

## 2017-07-12 PROCEDURE — 84270 ASSAY OF SEX HORMONE GLOBUL: CPT

## 2017-07-12 NOTE — ASSESSMENT & PLAN NOTE
Patient states that he also suffers from his left thumb locking on him. It will get stuck and take minutes to change positions. He denies any trauma

## 2017-07-12 NOTE — ASSESSMENT & PLAN NOTE
Patient states that he was diagnosed with a celiac artery aneurysm in [place]. He was watching this with yearly ultrasounds as recommended by his internist. We tried to obtain an ultrasound for surveillance here however the image is poor. As a result a CT scan of the abdomen was recommended. This was ordered however patient could not afford this with his co-pay.

## 2017-07-12 NOTE — ASSESSMENT & PLAN NOTE
Patient states that he's had left knee pain for many months. He would like to be seen by orthopedic physician. He does not want any imaging here, he wants to orthopedic physician to order what he needs. He states this knee pain is preventing him from being active and losing weight.  He denies any popping or locking or instability.

## 2017-07-12 NOTE — ASSESSMENT & PLAN NOTE
Patient has been complaining that these oval lesions have been irritating, flaky, causing discomfort

## 2017-07-12 NOTE — PROGRESS NOTES
This medical record contains text that has been entered with the assistance of computer voice recognition and dictation software.  Therefore, it may contain unintended errors in text, spelling, punctuation, or grammar    Chief Complaint   Patient presents with   • Other     see reason for visit       Jhonatan Fonseca is a 57 y.o. male here evaluation and management of: Trigger thumb, left knee pain, obstructive sleep apnea, celiac artery aneurysm, ISK      HPI:     DK (obstructive sleep apnea)  Has been on it for one month now  No longer snoring  He does not feel more rested or energized  He is sleeping through night      Celiac artery aneurysm (CMS-[facility])  Patient states that he was diagnosed with a celiac artery aneurysm in [place]. He was watching this with yearly ultrasounds as recommended by his internist. We tried to obtain an ultrasound for surveillance here however the image is poor. As a result a CT scan of the abdomen was recommended. This was ordered however patient could not afford this with his co-pay.    Inflamed seborrheic keratosis  Patient has been complaining that these oval lesions have been irritating, flaky, causing discomfort            Left knee pain  Patient states that he's had left knee pain for many months. He would like to be seen by orthopedic physician. He does not want any imaging here, he wants to orthopedic physician to order what he needs. He states this knee pain is preventing him from being active and losing weight.  He denies any popping or locking or instability.    Trigger finger of left thumb  Patient states that he also suffers from his left thumb locking on him. It will get stuck and take minutes to change positions. He denies any trauma      Current medicines (including changes today)  Current Outpatient Prescriptions   Medication Sig Dispense Refill   • atorvastatin (LIPITOR) 20 MG Tab TAKE 1 TAB BY MOUTH EVERY  DAY 90 Tab 0   • bisoprolol (ZEBETA) 5 MG Tab Take 1  tablet by mouth  every day 90 Tab 0   • amlodipine (NORVASC) 5 MG Tab TAKE 1 TABLET BY MOUTH  EVERY DAY . 90 Tab 0   • allopurinol (ZYLOPRIM) 300 MG Tab TAKE 1 TABLET BY MOUTH  EVERY DAY 90 Tab 0   • Omega-3 Fatty Acids (FISH OIL) 1000 MG Cap capsule Take 1,000 mg by mouth 3 times a day, with meals.     • therapeutic multivitamin-minerals (THERAGRAN-M) Tab Take 1 Tab by mouth every day.     • aspirin EC (ECOTRIN) 81 MG Tablet Delayed Response Take 81 mg by mouth every day.     • atorvastatin (LIPITOR) 20 MG Tab Take 20 mg by mouth every evening.     • amlodipine (NORVASC) 5 MG Tab Take 1 tablet by mouth  every day 90 Tab 0   • atorvastatin (LIPITOR) 20 MG Tab Take 1 tablet by mouth  every day 90 Tab 0   • allopurinol (ZYLOPRIM) 300 MG Tab Take 1 tablet by mouth  every day 90 Tab 0   • allopurinol (ZYLOPRIM) 300 MG Tab Take 1 tablet by mouth  every day 90 Tab 0   • amlodipine (NORVASC) 5 MG Tab Take 1 tablet by mouth  every day 90 Tab 0   • fluticasone (FLONASE) 50 MCG/ACT nasal spray Spray 50 Sprays in nose as needed. 16 g 0   • fluticasone (FLONASE) 50 MCG/ACT nasal spray Spray 1 Spray in nose every day. 16 g 2   • zolpidem (AMBIEN) 5 MG Tab Take 1-2 tablets by mouth every evening as needed for insomnia. Bring to sleep study. (Patient not taking: Reported on 7/11/2017) 3 Tab 0   • oxycodone-acetaminophen (PERCOCET) 5-325 MG Tab Take 1-2 Tabs by mouth every four hours as needed.     • allopurinol (ZYLOPRIM) 300 MG Tab Take 300 mg by mouth every day.     • amlodipine (NORVASC) 5 MG Tab Take 5 mg by mouth every day.     • bisoprolol (ZEBETA) 5 MG Tab Take 5 mg by mouth every day.       No current facility-administered medications for this visit.     He  has a past medical history of Celiac artery aneurysm (CMS-HCC); Sleep apnea; Hypertension; Kidney stone; and Chickenpox.  He  has past surgical history that includes lithotripsy and knee arthroscopy.  Social History   Substance Use Topics   • Smoking status: Never  "Smoker    • Smokeless tobacco: Never Used   • Alcohol Use: 0.0 oz/week     0 Standard drinks or equivalent per week      Comment: very occasionally     Social History     Social History Narrative     Family History   Problem Relation Age of Onset   • Cancer Brother      parathyroid   • Diabetes Brother      Family Status   Relation Status Death Age   • Mother     • Father     • Sister Alive    • Brother Alive    • Maternal Grandmother     • Maternal Grandfather     • Paternal Grandmother     • Paternal Grandfather     • Brother Alive          ROS  Please see hpi    All other systems reviewed and are negative     Objective:     Blood pressure 130/80, pulse 68, temperature 36.8 °C (98.2 °F), height 1.778 m (5' 10\"), weight 127.007 kg (280 lb), SpO2 94 %. Body mass index is 40.18 kg/(m^2).  Physical Exam:              SKIN EXAM  Several well-demarcated, round or oval lesions with a dull, verrucous surface and a typical stuck-on appearance on back and chest  multiple lesions on bilateral forearms, hands, and chest, with evidence of of solar damage present , spotty hyperpigmentation, scattered telangiectasias, and  Xerosis      PROCEDURE: CRYOTHERAPY  Discussed risks and benefits of cryotherapy. Patient verbally agreed. 3 applications of cryotherapy were applied to all lesions 5 AK's and 4 SK's. Patient tolerated procedure well. Aftercare instructions given.        Eye: Equal, round and reactive, conjunctiva clear, lids normal.  ENMT: Lips without lesions, good dentition, oropharynx clear.  Neck: Trachea midline, no masses, no thyromegaly. No cervical or supraclavicular lymphadenopathy.  Respiratory: Unlabored respiratory effort, lungs clear to auscultation, no wheezes, no ronchi.  Cardiovascular: Normal S1, S2, no murmur, no edema.  Abdomen: Soft, non-tender, no masses, no hepatosplenomegaly.  Psych: Alert and oriented x3, normal affect and mood.          Assessment and " Plan:   The following treatment plan was discussed      1. Need for vaccination  Left before getting vaccine    - TDAP VACCINE =>6YO IM    2. DK (obstructive sleep apnea)  Not complianing due to discomfort    3. Nocturia    - PSA TOTAL + %FREE; Future    4. Essential hypertension  I explained to the patient that the most common cause of death in [place] in both Male and Females was Acute MI and the most common risk factor for MI was hypertension.  The Patient was counseled on aggressive life style modifications such as running at least 20minutes per day 3 times per wk, and decreasing Sodium intake,      In addition to pharmacotherapy we discussed  lifestyle modification…#1. Maintain normal weight (BMI 18.5 to 24.kg/m2), #2 DASH diet, #3 Decrease Sodium intake to less than 100meq/day (2.4g Na or 6g NaCL), #4 increase physical activity, #5 Limit Etoh consumption to 2 drinks/day in men and 1 drink per day in women.   - COMP METABOLIC PANEL; Future  - CBC WITH DIFFERENTIAL; Future  - LIPID PROFILE; Future    5. Chronic fatigue    - BIOAVAILABLE+FREE TESTOSTERONE    6. Chronic pain of left knee  Patient was instructed on activity modification ×2 weeks  Use the brace that  was given in clinic for 2 weeks with activity  NSAIDs when necessary  Ice when necessary and compression    - DX-KNEE 3 VIEWS LEFT; Future  - REFERRAL TO ORTHOPEDICS  - REFERRAL TO ORTHOPEDICS    7. Trigger finger of left thumb    - REFERRAL TO HAND SURGERY    8. Celiac artery aneurysm (CMS-HCC)    - CT-ABDOMEN W/O; Future    9. AK (actinic keratosis)  Patient tollerrated procedure well  There were no adverse events  Patient was given post procedure precautions       10. Inflamed seborrheic keratosis  Patient tollerrated procedure well  There were no adverse events  Patient was given post procedure precautions       11. Obesity, morbid, BMI 40.0-49.9 (CMS-HCC)    We discussed agressive lifestyle modifications with weight loss, aerobic exercise, and  eating a prudent diet, which  included avoiding fried foods, using low-fat milk and avoiding simple carbohydrate, making a food diary. If you can't run because of pain do the stationary bike/elipticle or swim 30minutes 3 times per wk, in the beginning and then gradually increase.    - Patient identified as having weight management issue.  Appropriate orders and counseling given.      HEALTH MAINTENANCE: due for colonoscopy and Tdap    Instructed to Follow up in clinic or ER for worsening symptoms, difficulty breathing, lack of expected recovery, or should new symptoms or problems arise.    Followup: Return in about 3 months (around 10/11/2017) for Reevaluation.       Once again this medical record contains text that has been entered with the assistance of computer voice recognition and dictation software.  Therefore, it may contain unintended errors in text, spelling, punctuation, or grammar

## 2017-07-12 NOTE — ASSESSMENT & PLAN NOTE
Has been on it for one month now  No longer snoring  He does not feel more rested or energized  He is sleeping through night

## 2017-07-13 LAB
PSA FREE MFR SERPL: 36 %
PSA FREE SERPL-MCNC: 0.5 NG/ML
PSA SERPL-MCNC: 1.4 NG/ML (ref 0–4)
TEST NAME 95000: ABNORMAL

## 2017-07-14 ENCOUNTER — APPOINTMENT (OUTPATIENT)
Dept: SLEEP MEDICINE | Facility: MEDICAL CENTER | Age: 57
End: 2017-07-14
Payer: COMMERCIAL

## 2017-07-18 ENCOUNTER — APPOINTMENT (OUTPATIENT)
Dept: RADIOLOGY | Facility: MEDICAL CENTER | Age: 57
End: 2017-07-18
Attending: FAMILY MEDICINE
Payer: COMMERCIAL

## 2017-07-20 ENCOUNTER — TELEPHONE (OUTPATIENT)
Dept: SLEEP MEDICINE | Facility: MEDICAL CENTER | Age: 57
End: 2017-07-20

## 2017-07-20 ENCOUNTER — TELEPHONE (OUTPATIENT)
Dept: PULMONOLOGY | Facility: HOSPICE | Age: 57
End: 2017-07-20

## 2017-07-20 NOTE — TELEPHONE ENCOUNTER
Pt called to let us know the reason he cancelled today's apt was due to losing he chip from his CPAP.

## 2017-07-20 NOTE — TELEPHONE ENCOUNTER
----- Message from Your Healthcare Team sent at 7/19/2017  4:39 PM PDT -----  Regarding: Test Result Question  Contact: [phone]  Alfredo Arellano,    I've been trying to reach you. I'm sorry I missed our appointment last Friday - I took my wife to treat her newly broken foot and in the process of assisting her to the car dropped the little Sim card with the CPAP data.  I need to have an appointment by Sept 5th for insurance purposes. Is there a way to electronically send you the data from the card and we can do a simple phone appointment to review the results?    Thanks!     Alan Fonseca  [phone] cell

## 2017-07-21 ENCOUNTER — SLEEP CENTER VISIT (OUTPATIENT)
Dept: SLEEP MEDICINE | Facility: MEDICAL CENTER | Age: 57
End: 2017-07-21
Payer: COMMERCIAL

## 2017-07-21 VITALS
BODY MASS INDEX: 40.09 KG/M2 | RESPIRATION RATE: 15 BRPM | DIASTOLIC BLOOD PRESSURE: 70 MMHG | HEART RATE: 68 BPM | SYSTOLIC BLOOD PRESSURE: 130 MMHG | WEIGHT: 280 LBS | OXYGEN SATURATION: 93 % | HEIGHT: 70 IN

## 2017-07-21 DIAGNOSIS — I10 ESSENTIAL HYPERTENSION: ICD-10-CM

## 2017-07-21 DIAGNOSIS — G47.33 OSA (OBSTRUCTIVE SLEEP APNEA): ICD-10-CM

## 2017-07-21 DIAGNOSIS — E78.00 PURE HYPERCHOLESTEROLEMIA: ICD-10-CM

## 2017-07-21 PROCEDURE — 99213 OFFICE O/P EST LOW 20 MIN: CPT | Performed by: NURSE PRACTITIONER

## 2017-07-21 NOTE — MR AVS SNAPSHOT
"Jhonatan Fonseca   2017 1:40 PM   Sleep Center Visit   MRN: [id number]    Department:  [facility]   Dept Phone:  [phone]    Description:  Male : 1960   Provider:  AKOSUA Wayne           Reason for Visit     Follow-Up Complience DL      Allergies as of 2017     Allergen Noted Reactions    Lisinopril 2016   Hives      You were diagnosed with     DK (obstructive sleep apnea)   [258322]       Essential hypertension   [4502227]       Pure hypercholesterolemia   [272.0.ICD-9-CM]       BMI 40.0-44.9, adult (CMS-HCC)   [206310]         Vital Signs     Blood Pressure Pulse Respirations Height Weight Body Mass Index    130/70 mmHg 68 15 1.778 m (5' 10\") 127.007 kg (280 lb) 40.18 kg/m2    Oxygen Saturation Smoking Status                93% Never Smoker           Basic Information     Date Of Birth Sex Race Ethnicity Preferred Language    1960 Male White Non- English      Problem List              ICD-10-CM Priority Class Noted - Resolved    Essential hypertension I10   2016 - Present    DK (obstructive sleep apnea) G47.33   2016 - Present    Pure hypercholesterolemia E78.00   2016 - Present    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   2016 - Present    Bilateral hearing loss H91.93   2016 - Present    Preventative health care Z00.00   2016 - Present    Increased BMI R63.8   2/15/2017 - Present    BMI 40.0-44.9, adult (CMS-HCC) Z68.41   5/15/2017 - Present    Nocturia R35.1   2017 - Present    Chronic fatigue R53.82   2017 - Present    Left knee pain M25.562   2017 - Present    Trigger finger of left thumb M65.312   2017 - Present    Inflamed seborrheic keratosis L82.0   2017 - Present      Health Maintenance        Date Due Completion Dates    IMM DTaP/Tdap/Td Vaccine (1 - Tdap) 1978 ---    COLONOSCOPY 2009 ---    IMM INFLUENZA (1) 2017 ---            Current Immunizations     Tdap " Vaccine  Deferred (Patient Schedule)      Below and/or attached are the medications your provider expects you to take. Review all of your home medications and newly ordered medications with your provider and/or pharmacist. Follow medication instructions as directed by your provider and/or pharmacist. Please keep your medication list with you and share with your provider. Update the information when medications are discontinued, doses are changed, or new medications (including over-the-counter products) are added; and carry medication information at all times in the event of emergency situations     Allergies:  LISINOPRIL - Hives               Medications  Valid as of: July 21, 2017 -  2:52 PM    Generic Name Brand Name Tablet Size Instructions for use    Allopurinol (Tab) ZYLOPRIM 300 MG TAKE 1 TABLET BY MOUTH  EVERY DAY        Allopurinol (Tab) ZYLOPRIM 300 MG Take 1 tablet by mouth  every day        Allopurinol (Tab) ZYLOPRIM 300 MG Take 1 tablet by mouth  every day        AmLODIPine Besylate (Tab) NORVASC 5 MG TAKE 1 TABLET BY MOUTH  EVERY DAY .        AmLODIPine Besylate (Tab) NORVASC 5 MG Take 1 tablet by mouth  every day        AmLODIPine Besylate (Tab) NORVASC 5 MG Take 1 tablet by mouth  every day        Aspirin (Tablet Delayed Response) ECOTRIN 81 MG Take 81 mg by mouth every day.        Atorvastatin Calcium (Tab) LIPITOR 20 MG Take 20 mg by mouth every evening.        Atorvastatin Calcium (Tab) LIPITOR 20 MG TAKE 1 TAB BY MOUTH EVERY  DAY        Atorvastatin Calcium (Tab) LIPITOR 20 MG Take 1 tablet by mouth  every day        Bisoprolol Fumarate (Tab) ZEBETA 5 MG Take 1 tablet by mouth  every day        Fluticasone Propionate (Suspension) FLONASE 50 MCG/ACT Spray 50 Sprays in nose as needed.        Fluticasone Propionate (Suspension) FLONASE 50 MCG/ACT Spray 1 Spray in nose every day.        Multiple Vitamins-Minerals (Tab) THERAGRAN-M  Take 1 Tab by mouth every day.        Omega-3 Fatty Acids (Cap) fish  oil 1000 MG Take 1,000 mg by mouth 3 times a day, with meals.        .                 Medicines prescribed today were sent to:     [facility] - [address] [facility] [address]    Phone: [phone] Fax: [phone]    Open 24 Hours?: No    [id number] - [place], [place] - [address]    [address] [place] [address]    Phone: [phone] Fax: [phone]    Open 24 Hours?: No      Medication refill instructions:       If your prescription bottle indicates you have medication refills left, it is not necessary to call your provider’s office. Please contact your pharmacy and they will refill your medication.    If your prescription bottle indicates you do not have any refills left, you may request refills at any time through one of the following ways: The online [employer] system (except Urgent Care), by calling your provider’s office, or by asking your pharmacy to contact your provider’s office with a refill request. Medication refills are processed only during regular business hours and may not be available until the next business day. Your provider may request additional information or to have a follow-up visit with you prior to refilling your medication.   *Please Note: Medication refills are assigned a new Rx number when refilled electronically. Your pharmacy may indicate that no refills were authorized even though a new prescription for the same medication is available at the pharmacy. Please request the medicine by name with the pharmacy before contacting your provider for a refill.        Instructions    1. Continue CPAP nightly  2. Clean mask & tubing weekly  3. Replace supplies as insurance will allow  4. Follow up annually, sooner if needed  5. Verify overnight oximetry, order through [facility], okay to call for results once completed            [employer] Access Code: Activation code not generated  Current [employer] Status:  Active

## 2017-07-21 NOTE — PROGRESS NOTES
Chief Complaint   Patient presents with   • Follow-Up     Complience DL         HPI: This patient is a 57 y.o. male, who presents for 6 week follow-up of DK. Medical history includes HTN HLD, BMI 40.    In regards to DK, home sleep study indicates severe sleep apnea with an AHI of 61.5, supine index 72.7, minimum oxygen saturation of 68 %. He is compliant with auto CPAP 5-15 cm H2O. Compliance download over the past 30 days indicates 100 % compliance, average use of 5 hours 57 minutes per night, AHI of 5.7. Mask and pressures are comfortable. Currently using a nasal mask. He has noticed improvement in the quality of his sleep, there are less interruptions. He does not notice much improvement in terms of energy. He denies morning headaches. His wife reports snoring has completely resolved. He does feel he benefits from therapy.    Past Medical History   Diagnosis Date   • Celiac artery aneurysm (CMS-HCC)    • Sleep apnea    • Hypertension    • Kidney stone    • Chickenpox        Social History   Substance Use Topics   • Smoking status: Never Smoker    • Smokeless tobacco: Never Used   • Alcohol Use: 0.0 oz/week     0 Standard drinks or equivalent per week      Comment: very occasionally       Family History   Problem Relation Age of Onset   • Cancer Brother      parathyroid   • Sleep Apnea Brother    • Diabetes Brother    • Sleep Apnea Brother        Current medications as of today   Current Outpatient Prescriptions   Medication Sig Dispense Refill   • atorvastatin (LIPITOR) 20 MG Tab TAKE 1 TAB BY MOUTH EVERY  DAY 90 Tab 0   • bisoprolol (ZEBETA) 5 MG Tab Take 1 tablet by mouth  every day 90 Tab 0   • fluticasone (FLONASE) 50 MCG/ACT nasal spray Spray 50 Sprays in nose as needed. 16 g 0   • amlodipine (NORVASC) 5 MG Tab TAKE 1 TABLET BY MOUTH  EVERY DAY . 90 Tab 0   • allopurinol (ZYLOPRIM) 300 MG Tab TAKE 1 TABLET BY MOUTH  EVERY DAY 90 Tab 0   • Omega-3 Fatty Acids (FISH OIL) 1000 MG Cap capsule Take 1,000 mg by  "mouth 3 times a day, with meals.     • therapeutic multivitamin-minerals (THERAGRAN-M) Tab Take 1 Tab by mouth every day.     • aspirin EC (ECOTRIN) 81 MG Tablet Delayed Response Take 81 mg by mouth every day.     • atorvastatin (LIPITOR) 20 MG Tab Take 20 mg by mouth every evening.     • amlodipine (NORVASC) 5 MG Tab Take 1 tablet by mouth  every day 90 Tab 0   • atorvastatin (LIPITOR) 20 MG Tab Take 1 tablet by mouth  every day 90 Tab 0   • allopurinol (ZYLOPRIM) 300 MG Tab Take 1 tablet by mouth  every day 90 Tab 0   • allopurinol (ZYLOPRIM) 300 MG Tab Take 1 tablet by mouth  every day 90 Tab 0   • amlodipine (NORVASC) 5 MG Tab Take 1 tablet by mouth  every day 90 Tab 0   • fluticasone (FLONASE) 50 MCG/ACT nasal spray Spray 1 Spray in nose every day. 16 g 2     No current facility-administered medications for this visit.       Allergies: Lisinopril    Blood pressure 130/70, pulse 68, resp. rate 15, height 1.778 m (5' 10\"), weight 127.007 kg (280 lb), SpO2 93 %.      ROS:   Constitutional: Denies fevers, chills, night sweats, weight loss or fatigue  Eyes: Denies pain, discharge/drainage  ENT: Denies tinnitus, hearing loss, sinusitis, hoarseness, epistaxis. Positive nasal congestion.  Allergic: Denies Allergic rhinitis or hayfever  Respiratory: Denies cough, wheeze, dyspnea, hemoptysis  Cardiovascular: Denies chest pain, tightness, palpitations, orthopnea or edema  Sleep: See HPI  GI/: Denies heartburn, nausea, vomiting, urinary incontinence, hematuria  Psychiatric: Denies depression or anxiety    Physical exam:   Constitutional: Obese, in no acute distress  Eyes: PERRLA  Neck: supple, no masses  Respiratory: no intercostal retractions or accessory muscle use   Lungs auscultation: Clear to auscultation bilaterally  Cardiovascular: Regular rate rhythm no murmurs, rubs or gallops  Musculoskeletal: Normal gait, no clubbing or cyanosis  Skin: No rashes or lesions  Neuro: No focal deficit, cranial nerves grossly " intact  Psychiatric: Oriented to time, person and place.     Diagnosis:  1. DK (obstructive sleep apnea)  DME [facility]   2. Essential hypertension     3. Pure hypercholesterolemia     4. BMI 40.0-44.9, adult (CMS-[facility])         Plan:    1. Continue CPAP nightly  2. Clean mask & tubing weekly  3. Replace supplies as insurance will allow  4. Patient would like to follow up annually, sooner if needed  5. Verify overnight oximetry, order through [facility], okay to call for results once completed

## 2017-07-24 ENCOUNTER — TELEPHONE (OUTPATIENT)
Dept: SLEEP MEDICINE | Facility: MEDICAL CENTER | Age: 57
End: 2017-07-24

## 2017-07-24 NOTE — TELEPHONE ENCOUNTER
----- Message from Your Healthcare Team sent at 7/19/2017  4:39 PM PDT -----  Regarding: Test Result Question  Contact: [phone]  Alfredo Arellano,    I've been trying to reach you. I'm sorry I missed our appointment last Friday - I took my wife to treat her newly broken foot and in the process of assisting her to the car dropped the little Sim card with the CPAP data.  I need to have an appointment by Sept 5th for insurance purposes. Is there a way to electronically send you the data from the card and we can do a simple phone appointment to review the results?    Thanks!     Alan Fonseca  [phone] cell

## 2017-08-22 RX ORDER — BISOPROLOL FUMARATE 5 MG/1
TABLET, FILM COATED ORAL
Qty: 90 TAB | Refills: 0 | Status: SHIPPED | OUTPATIENT
Start: 2017-08-22 | End: 2017-11-17 | Stop reason: SDUPTHER

## 2017-08-22 RX ORDER — ATORVASTATIN CALCIUM 20 MG/1
TABLET, FILM COATED ORAL
Qty: 90 TAB | Refills: 0 | Status: SHIPPED | OUTPATIENT
Start: 2017-08-22 | End: 2018-11-26

## 2017-09-08 RX ORDER — ALLOPURINOL 300 MG/1
300 TABLET ORAL
Qty: 90 TAB | Refills: 0 | Status: SHIPPED | OUTPATIENT
Start: 2017-09-08 | End: 2017-11-01 | Stop reason: SDUPTHER

## 2017-09-08 RX ORDER — AMLODIPINE BESYLATE 5 MG/1
5 TABLET ORAL
Qty: 90 TAB | Refills: 0 | Status: SHIPPED | OUTPATIENT
Start: 2017-09-08 | End: 2017-11-01 | Stop reason: SDUPTHER

## 2017-11-21 RX ORDER — ATORVASTATIN CALCIUM 20 MG/1
TABLET, FILM COATED ORAL
Qty: 90 TAB | Refills: 0 | Status: SHIPPED | OUTPATIENT
Start: 2017-11-21 | End: 2018-04-05 | Stop reason: SDUPTHER

## 2017-11-21 RX ORDER — BISOPROLOL FUMARATE 5 MG/1
TABLET, FILM COATED ORAL
Qty: 90 TAB | Refills: 0 | Status: SHIPPED | OUTPATIENT
Start: 2017-11-21 | End: 2018-03-11 | Stop reason: SDUPTHER

## 2017-11-21 RX ORDER — FLUTICASONE PROPIONATE 50 MCG
SPRAY, SUSPENSION (ML) NASAL
Qty: 16 G | Refills: 0 | Status: SHIPPED | OUTPATIENT
Start: 2017-11-21 | End: 2018-03-15 | Stop reason: SDUPTHER

## 2018-02-06 RX ORDER — ALLOPURINOL 300 MG/1
TABLET ORAL
Qty: 90 TAB | Refills: 0 | Status: SHIPPED | OUTPATIENT
Start: 2018-02-06 | End: 2018-04-05 | Stop reason: SDUPTHER

## 2018-02-06 RX ORDER — AMLODIPINE BESYLATE 5 MG/1
TABLET ORAL
Qty: 90 TAB | Refills: 0 | Status: SHIPPED | OUTPATIENT
Start: 2018-02-06 | End: 2018-11-28

## 2018-03-13 RX ORDER — ATORVASTATIN CALCIUM 20 MG/1
TABLET, FILM COATED ORAL
Qty: 90 TAB | Refills: 0 | Status: SHIPPED | OUTPATIENT
Start: 2018-03-13 | End: 2018-11-26

## 2018-03-13 RX ORDER — BISOPROLOL FUMARATE 5 MG/1
TABLET, FILM COATED ORAL
Qty: 90 TAB | Refills: 0 | Status: SHIPPED | OUTPATIENT
Start: 2018-03-13 | End: 2018-04-05 | Stop reason: SDUPTHER

## 2018-03-15 ENCOUNTER — PATIENT MESSAGE (OUTPATIENT)
Dept: MEDICAL GROUP | Age: 58
End: 2018-03-15

## 2018-03-15 RX ORDER — FLUTICASONE PROPIONATE 50 MCG
1 SPRAY, SUSPENSION (ML) NASAL
Qty: 16 G | Refills: 0 | Status: SHIPPED | OUTPATIENT
Start: 2018-03-15 | End: 2018-04-05 | Stop reason: SDUPTHER

## 2018-04-05 ENCOUNTER — PATIENT MESSAGE (OUTPATIENT)
Dept: MEDICAL GROUP | Age: 58
End: 2018-04-05

## 2018-04-05 NOTE — TELEPHONE ENCOUNTER
Was the patient seen in the last year in this department? Yes     Does patient have an active prescription for medications requested? Yes     Received Request Via: Patient    Pt has been having issues filling these prescriptions -  I called [facility] and need to fax a new prescription over to them.

## 2018-04-06 RX ORDER — ATORVASTATIN CALCIUM 20 MG/1
20 TABLET, FILM COATED ORAL
Qty: 90 TAB | Refills: 0 | Status: SHIPPED | OUTPATIENT
Start: 2018-04-06 | End: 2018-06-25 | Stop reason: SDUPTHER

## 2018-04-06 RX ORDER — AMLODIPINE BESYLATE 5 MG/1
5 TABLET ORAL
Qty: 90 TAB | Refills: 0 | Status: SHIPPED | OUTPATIENT
Start: 2018-04-06 | End: 2018-06-25 | Stop reason: SDUPTHER

## 2018-04-06 RX ORDER — BISOPROLOL FUMARATE 5 MG/1
5 TABLET, FILM COATED ORAL
Qty: 90 TAB | Refills: 1 | Status: SHIPPED | OUTPATIENT
Start: 2018-04-06 | End: 2018-11-18 | Stop reason: SDUPTHER

## 2018-04-06 RX ORDER — FLUTICASONE PROPIONATE 50 MCG
1 SPRAY, SUSPENSION (ML) NASAL
Qty: 16 G | Refills: 0 | Status: SHIPPED | OUTPATIENT
Start: 2018-04-06 | End: 2018-06-25 | Stop reason: SDUPTHER

## 2018-04-06 RX ORDER — ALLOPURINOL 300 MG/1
300 TABLET ORAL
Qty: 90 TAB | Refills: 0 | Status: SHIPPED | OUTPATIENT
Start: 2018-04-06 | End: 2018-06-25 | Stop reason: SDUPTHER

## 2018-06-26 RX ORDER — AMLODIPINE BESYLATE 5 MG/1
TABLET ORAL
Qty: 90 TAB | Refills: 0 | Status: SHIPPED | OUTPATIENT
Start: 2018-06-26 | End: 2018-11-28

## 2018-06-26 RX ORDER — FLUTICASONE PROPIONATE 50 MCG
SPRAY, SUSPENSION (ML) NASAL
Qty: 16 G | Refills: 0 | Status: SHIPPED | OUTPATIENT
Start: 2018-06-26 | End: 2018-08-29 | Stop reason: SDUPTHER

## 2018-06-26 RX ORDER — ALLOPURINOL 300 MG/1
TABLET ORAL
Qty: 90 TAB | Refills: 0 | Status: SHIPPED | OUTPATIENT
Start: 2018-06-26 | End: 2018-11-28

## 2018-06-26 RX ORDER — ATORVASTATIN CALCIUM 20 MG/1
TABLET, FILM COATED ORAL
Qty: 90 TAB | Refills: 0 | Status: SHIPPED | OUTPATIENT
Start: 2018-06-26 | End: 2018-11-26

## 2018-08-29 DIAGNOSIS — I10 HYPERTENSION, UNSPECIFIED TYPE: ICD-10-CM

## 2018-08-29 DIAGNOSIS — J30.2 SEASONAL ALLERGIC RHINITIS, UNSPECIFIED TRIGGER: ICD-10-CM

## 2018-08-29 DIAGNOSIS — M10.9 GOUT, UNSPECIFIED CAUSE, UNSPECIFIED CHRONICITY, UNSPECIFIED SITE: ICD-10-CM

## 2018-08-29 RX ORDER — FLUTICASONE PROPIONATE 50 MCG
SPRAY, SUSPENSION (ML) NASAL
Qty: 16 G | Refills: 0 | Status: SHIPPED | OUTPATIENT
Start: 2018-08-29 | End: 2018-11-28 | Stop reason: SDUPTHER

## 2018-08-29 RX ORDER — ALLOPURINOL 300 MG/1
TABLET ORAL
Qty: 90 TAB | Refills: 0 | Status: SHIPPED | OUTPATIENT
Start: 2018-08-29 | End: 2018-11-28

## 2018-08-29 RX ORDER — AMLODIPINE BESYLATE 5 MG/1
TABLET ORAL
Qty: 90 TAB | Refills: 0 | Status: SHIPPED | OUTPATIENT
Start: 2018-08-29 | End: 2018-11-28

## 2018-08-29 NOTE — TELEPHONE ENCOUNTER
Was the patient seen in the last year in this department? No - 7/11/17    Does patient have an active prescription for medications requested? Yes    Received Request Via: Pharmacy    Pt advised to schedule appointment

## 2018-11-01 ENCOUNTER — APPOINTMENT (OUTPATIENT)
Dept: MEDICAL GROUP | Age: 58
End: 2018-11-01
Payer: COMMERCIAL

## 2018-11-19 ENCOUNTER — PATIENT MESSAGE (OUTPATIENT)
Dept: SLEEP MEDICINE | Facility: MEDICAL CENTER | Age: 58
End: 2018-11-19

## 2018-11-19 NOTE — PATIENT COMMUNICATION
I have already informed the patient no labs were ordered at last office visit just OPO to be completed through [facility]. Order is passed a year old would you like pt to complete if so sign new order

## 2018-11-19 NOTE — TELEPHONE ENCOUNTER
Was the patient seen in the last year in this department? Yes lov 7/11/18    Does patient have an active prescription for medications requested? No     Received Request Via: Pharmacy

## 2018-11-19 NOTE — TELEPHONE ENCOUNTER
----- Message from Jhonatan Fonseca sent at 11/19/2018 12:00 PM PST -----  Regarding: RE: Non-Urgent Medical Question  Contact: [phone]  Alfredo Ruiz,    1. Does the [facility] WANT labs to review for my upcoming visit 11/28? I think its a good idea since this visit has to do with renewing my prescriptions.    2. I did not receive, nor was made aware of any test - assuming for my sleep apnea.  Do you have any details about this test and what/when it was sent?    Thanks!    Norm  ----- Message -----  From: Jessica Tristan, Med Ass't  Sent: 11/19/2018  8:53 AM PST  To: Jhonatan Fonseca  Subject: RE: Non-Urgent Medical Question  Good Morning. No labs where ordered at your last office visit. A overnight oximeter test was ordered to be completed through your [facility] [facility]. Have you completed the test?    ----- Message -----     From: Jhonatan Fonseca     Sent: 11/19/2018  7:50 AM PST       To: AKOSUA Wayne  Subject: Non-Urgent Medical Question    Alfredo Jackson,    Can you find out if I should have labs done before my appointment on the the 28th? If so, can you let me know that they've been ordered so I can schedule an appt.?    Thanks!    Norm

## 2018-11-26 RX ORDER — ATORVASTATIN CALCIUM 20 MG/1
TABLET, FILM COATED ORAL
Qty: 90 TAB | Refills: 0 | Status: SHIPPED | OUTPATIENT
Start: 2018-11-26 | End: 2018-12-30 | Stop reason: SDUPTHER

## 2018-11-26 RX ORDER — BISOPROLOL FUMARATE 5 MG/1
TABLET, FILM COATED ORAL
Qty: 90 TAB | Refills: 0 | Status: SHIPPED | OUTPATIENT
Start: 2018-11-26 | End: 2018-12-30 | Stop reason: SDUPTHER

## 2018-11-28 ENCOUNTER — OFFICE VISIT (OUTPATIENT)
Dept: MEDICAL GROUP | Age: 58
End: 2018-11-28
Payer: COMMERCIAL

## 2018-11-28 VITALS
SYSTOLIC BLOOD PRESSURE: 136 MMHG | HEART RATE: 76 BPM | WEIGHT: 281 LBS | HEIGHT: 70 IN | DIASTOLIC BLOOD PRESSURE: 90 MMHG | OXYGEN SATURATION: 95 % | TEMPERATURE: 98 F | BODY MASS INDEX: 40.23 KG/M2

## 2018-11-28 DIAGNOSIS — L82.0 INFLAMED SEBORRHEIC KERATOSIS: ICD-10-CM

## 2018-11-28 DIAGNOSIS — N40.0 BENIGN PROSTATIC HYPERPLASIA WITHOUT LOWER URINARY TRACT SYMPTOMS: ICD-10-CM

## 2018-11-28 DIAGNOSIS — J30.2 SEASONAL ALLERGIC RHINITIS, UNSPECIFIED TRIGGER: ICD-10-CM

## 2018-11-28 DIAGNOSIS — M25.562 CHRONIC PAIN OF LEFT KNEE: ICD-10-CM

## 2018-11-28 DIAGNOSIS — Z12.11 SCREENING FOR COLON CANCER: ICD-10-CM

## 2018-11-28 DIAGNOSIS — E78.00 PURE HYPERCHOLESTEROLEMIA: ICD-10-CM

## 2018-11-28 DIAGNOSIS — G89.29 CHRONIC PAIN OF LEFT KNEE: ICD-10-CM

## 2018-11-28 DIAGNOSIS — J30.2 SEASONAL ALLERGIES: ICD-10-CM

## 2018-11-28 DIAGNOSIS — G47.33 OSA (OBSTRUCTIVE SLEEP APNEA): ICD-10-CM

## 2018-11-28 DIAGNOSIS — L57.0 AK (ACTINIC KERATOSIS): ICD-10-CM

## 2018-11-28 DIAGNOSIS — R07.89 OTHER CHEST PAIN: ICD-10-CM

## 2018-11-28 DIAGNOSIS — I10 ESSENTIAL HYPERTENSION: ICD-10-CM

## 2018-11-28 PROCEDURE — 17000 DESTRUCT PREMALG LESION: CPT | Mod: 59 | Performed by: FAMILY MEDICINE

## 2018-11-28 PROCEDURE — 17110 DESTRUCTION B9 LES UP TO 14: CPT | Performed by: FAMILY MEDICINE

## 2018-11-28 PROCEDURE — 99214 OFFICE O/P EST MOD 30 MIN: CPT | Mod: 25 | Performed by: FAMILY MEDICINE

## 2018-11-28 PROCEDURE — 17003 DESTRUCT PREMALG LES 2-14: CPT | Performed by: FAMILY MEDICINE

## 2018-11-28 RX ORDER — LORATADINE 10 MG/1
10 TABLET ORAL DAILY
Qty: 90 TAB | Refills: 1 | Status: ON HOLD | OUTPATIENT
Start: 2018-11-28 | End: 2019-08-15

## 2018-11-28 RX ORDER — FLUTICASONE PROPIONATE 50 MCG
1 SPRAY, SUSPENSION (ML) NASAL
Qty: 16 G | Refills: 2 | Status: SHIPPED | OUTPATIENT
Start: 2018-11-28 | End: 2019-08-05 | Stop reason: SDUPTHER

## 2018-11-28 ASSESSMENT — PATIENT HEALTH QUESTIONNAIRE - PHQ9: CLINICAL INTERPRETATION OF PHQ2 SCORE: 0

## 2018-11-28 NOTE — ASSESSMENT & PLAN NOTE
Insert new problem    Patient states that he is having chest discomfort actually more shortness of breath on exertion.  But this only happens when he is walking he does not have this in the swimming pool.  He does have a past medical history obstructive sleep apnea hypertension obesity and celiac artery aneurysm.  He first noticed his dyspnea on exertion when he was moving furniture around the house in preparation for his son's birthday party.  He does not have chest pain at this moment, he does have difficulty sleeping on his back.  He states that he has been compliant with a CPAP machine.

## 2018-11-28 NOTE — ASSESSMENT & PLAN NOTE
The patient states he has been using his CPAP machine  He states that but he still wakes up tired  His wife states he no longer snores.

## 2018-11-28 NOTE — ASSESSMENT & PLAN NOTE
Amlodipine 5 mg p.o. daily    Patient is on a baby aspirin 1 mg, as well as atorvastatin 20 g p.o. Daily. The patient has been tollerating the BP meds without any issues. No tunnel vision, no cough, no changes in vision, no lightheadedness, no fatigue, no syncopal or presyncopal episodes, no edema, no new rashes.     The patient also denies headaches, no numbness or tingling, no changes in vision, no weakness in any extremity, no back pain no changes in micturition.

## 2018-11-28 NOTE — PROGRESS NOTES
This medical record contains text that has been entered with the assistance of computer voice recognition and dictation software.  Therefore, it may contain unintended errors in text, spelling, punctuation, or grammar    Chief Complaint   Patient presents with   • Shortness of Breath     SOB when walking   • Medication Refill         Jhonatan Fonseca is a 58 y.o. male here evaluation and management of: Shortness of breath, routine follow-up      HPI:     Other chest pain  Insert new problem    Patient states that he is having chest discomfort actually more shortness of breath on exertion.  But this only happens when he is walking he does not have this in the swimming pool.  He does have a past medical history obstructive sleep apnea hypertension obesity and celiac artery aneurysm.  He first noticed his dyspnea on exertion when he was moving furniture around the house in preparation for his son's birthday party.  He does not have chest pain at this moment, he does have difficulty sleeping on his back.  He states that he has been compliant with a CPAP machine.    Seasonal allergies  Patient states that over-the-counter Allergan and Flonase has not been effective for his seasonal allergies.    DK (obstructive sleep apnea)  The patient states he has been using his CPAP machine  He states that but he still wakes up tired  His wife states he no longer snores.    Essential hypertension  Amlodipine 5 mg p.o. daily    Patient is on a baby aspirin 1 mg, as well as atorvastatin 20 g p.o. Daily. The patient has been tollerating the BP meds without any issues. No tunnel vision, no cough, no changes in vision, no lightheadedness, no fatigue, no syncopal or presyncopal episodes, no edema, no new rashes.     The patient also denies headaches, no numbness or tingling, no changes in vision, no weakness in any extremity, no back pain no changes in micturition.        Chronic pain of left knee  Patient states because of his chronic  left knee pain he no longer walks or runs.  However he has transitioned into swimming laps in the swimming pool.  He states he can go 20 laps without shortness of breath.  This does not affect his knees or any other joints.  He believes he is lost 3 pounds since last visit.    Current medicines (including changes today)  Current Outpatient Prescriptions   Medication Sig Dispense Refill   • loratadine (CLARITIN) 10 MG Tab Take 1 Tab by mouth every day. 90 Tab 1   • fluticasone (FLONASE) 50 MCG/ACT nasal spray Spray 1 Spray in nose every day. 16 g 2   • bisoprolol (ZEBETA) 5 MG Tab TAKE 1 TABLET BY MOUTH  EVERY DAY 90 Tab 0   • atorvastatin (LIPITOR) 20 MG Tab TAKE 1 TABLET BY MOUTH  EVERY DAY 90 Tab 0   • allopurinol (ZYLOPRIM) 300 MG Tab Take 1 tablet by mouth  every day 90 Tab 0   • amlodipine (NORVASC) 5 MG Tab TAKE 1 TABLET BY MOUTH  EVERY DAY . 90 Tab 0   • Omega-3 Fatty Acids (FISH OIL) 1000 MG Cap capsule Take 1,000 mg by mouth 3 times a day, with meals.     • therapeutic multivitamin-minerals (THERAGRAN-M) Tab Take 1 Tab by mouth every day.     • aspirin EC (ECOTRIN) 81 MG Tablet Delayed Response Take 81 mg by mouth every day.       No current facility-administered medications for this visit.      He  has a past medical history of Celiac artery aneurysm (HCC); Chickenpox; Hypertension; Kidney stone; and Sleep apnea.  He  has a past surgical history that includes lithotripsy and knee arthroscopy.  Social History   Substance Use Topics   • Smoking status: Never Smoker   • Smokeless tobacco: Never Used   • Alcohol use 0.0 oz/week      Comment: very occasionally     Social History     Social History Narrative   • No narrative on file     Family History   Problem Relation Age of Onset   • Cancer Brother         parathyroid   • Sleep Apnea Brother    • Diabetes Brother    • Sleep Apnea Brother      Family Status   Relation Status   • Bro Alive   • Bro Alive   • Mo    • Fa    • Sis Alive   • MGMo  "   • MGFa    • PGMo    • PGFa          ROS    Please see hpi     All other systems reviewed and are negative     Objective:     Blood pressure 136/90, pulse 76, temperature 36.7 °C (98 °F), temperature source Temporal, height 1.778 m (5' 10\"), weight (!) 127.5 kg (281 lb), SpO2 95 %. Body mass index is 40.32 kg/m².  Physical Exam:    Constitutional: Alert, no distress.  Skin: Warm, dry, good turgor, no rashes in visible areas.  Eye: Equal, round and reactive, conjunctiva clear, lids normal.  ENMT: Lips without lesions, good dentition, oropharynx clear.  Neck: Trachea midline, no masses, no thyromegaly. No cervical or supraclavicular lymphadenopathy.  Respiratory: Unlabored respiratory effort, lungs clear to auscultation, no wheezes, no ronchi.  Cardiovascular: Normal S1, S2, no murmur, no edema.  Abdomen: Soft, non-tender, no masses, no hepatosplenomegaly.  Psych: Alert and oriented x3, normal affect and mood.  Lesn Destn - Benign  Date/Time: 2018 11:05 AM  Performed by: EDY LUTHER  Authorized by: EDY LUTHER     Number of Lesions: 2  Lesion 1:     Body area: trunk    Trunk location: back    Malignancy: benign lesion      Destruction method: cryotherapy    Lesion 2:     Body area: trunk    Trunk location: back    Malignancy: benign lesion      Destruction method: cryotherapy    Lesn Destn - Pre-malignant  Date/Time: 2018 11:05 AM  Performed by: EDY LUTHER  Authorized by: EDY LUTHER     Number of Lesions: 3  Lesion 1:     Body area: head/neck    Head/neck location: R cheek    Malignancy: malignancy unknown      Destruction method: cryotherapy    Lesion 2:     Body area: head/neck    Head/neck location: L cheek    Malignancy: malignancy unknown      Destruction method: cryotherapy    Lesion 3:     Body area: head/neck    Head/neck location: L cheek    Malignancy: malignancy unknown      Destruction method: " cryotherapy      Comment:     SKIN EXAM    ISK  Description--  Several irregular, pigmented, verrucous surface plaques with dried hemmorhage      Size 0.1cm-0.cm on back     Symptoms  Patient has been complaining of lesions which have been irritating, painful and causing discomfort so is interested in removal.      AK  multiple lesions on bilateral forearms, face,hands, and chest, with evidence of of solar damage present , spotty hyperpigmentation, scattered telangiectasias, and  Xerosis      PROCEDURE: CRYOTHERAPY  Discussed risks and benefits of cryotherapy including but not limited to scarring, hyperpigmentation, hypopigmentation, hypertrophic scarring, keiloid scarring, incomplete or no resolution of lesions treated,pain, undesirable cosemetic result, blistering, potential need for additional treatment including more invasive treatment. Patient expresses understanding and verbally acknowledges risks and consent to treatment. 2  applications of cryotherapy with 3 second freeze thaw cycle was applied to all AK's and  all irritated and inflamed Seborrheic Keratoses. Patient tolerated procedure well. There were no complications. Aftercare instructions given.              Assessment and Plan:   The following treatment plan was discussed      1. Other chest pain    Dyspnea on exertion will be evaluated by stress echocardiogram in the referral  It is unusual that he does not have dyspnea on exertion with swimming    - REFERRAL TO CARDIOLOGY  - EC-ECHOCARDIOGRAM DOBUTAMINE REST/STRESS W/ CONT; Future    2. Essential hypertension    Due for    - Lipid Profile; Future  - CBC WITHOUT DIFFERENTIAL; Future  - COMP METABOLIC PANEL; Future    3. Benign prostatic hyperplasia without lower urinary tract symptoms  He has had elevated alk phos in the past    - PROSTATE SPECIFIC AG DIAGNOSTIC; Future    4.Seasonal allergic rhinitis, unspecified trigger    - fluticasone (FLONASE) 50 MCG/ACT nasal spray; Spray 1 Spray in nose every  day.  Dispense: 16 g; Refill:     6. Screening for colon cancer    - REFERRAL TO GASTROENTEROLOGY    7. Inflamed seborrheic keratosis  Patient tollerrated procedure well  There were no adverse events  Patient was given post procedure precautions       8. DK (obstructive sleep apnea)      We discussed all pathologies related to untreated sleep apnea  Including but not limited to pulmonary hypertension, stroke, cardiovascular disease, difficulty losing weight, headaches, daytime somnolence, etc...    9. Pure hypercholesterolemia  Due for repeat labs    10. BMI 40.0-44.9, adult (HCC)    We discussed subsequent randomized trials, weight loss (via lifestyle or pharmacologic therapy) has been shown to reduce morbidity (reduction in risk factors for cardiovascular disease     The frequently cited Diabetes Prevention Program (DPP) significantly reduced the rate of progression from impaired glucose tolerance to diabetes over a three-year period in participants randomized to intensive lifestyle modification focusing on weight loss       We also discussed agressive lifestyle modifications with weight loss, aerobic exercise, and eating a prudent diet, which  included avoiding fried foods, using low-fat milk and avoiding simple carbohydrate, making a food diary. If you can't run because of pain do the stationary bike/elipticle or swim 30minutes 3 times per wk, in the beginning and then gradually increase.    11. Chronic pain of left knee  Patient was instructed on activity modification ×2 weeks  Use the brace that  was given in clinic for 2 weeks with activity  NSAIDs when necessary  Ice when necessary and compression          HEALTH MAINTENANCE:    Instructed to Follow up in clinic or ER for worsening symptoms, difficulty breathing, lack of expected recovery, or should new symptoms or problems arise.    Followup: Return in about 3 months (around 2/28/2019) for Reevaluation.       Once again this medical record contains text  that has been entered with the assistance of computer voice recognition and dictation software.  Therefore, it may contain unintended errors in text, spelling, punctuation, or grammar

## 2018-11-28 NOTE — ASSESSMENT & PLAN NOTE
Patient states because of his chronic left knee pain he no longer walks or runs.  However he has transitioned into swimming laps in the swimming pool.  He states he can go 20 laps without shortness of breath.  This does not affect his knees or any other joints.  He believes he is lost 3 pounds since last visit.

## 2018-11-28 NOTE — ASSESSMENT & PLAN NOTE
Patient states that over-the-counter Allergan and Flonase has not been effective for his seasonal allergies.

## 2018-11-29 ENCOUNTER — TELEPHONE (OUTPATIENT)
Dept: MEDICAL GROUP | Age: 58
End: 2018-11-29

## 2018-11-29 NOTE — TELEPHONE ENCOUNTER
1. Caller Name: Jhonatan Fonseca                                           Call Back Number: [phone]        Patient approves a detailed voicemail message: N\A    Pt would like to go to [place] for his echocardiogram, and needs order and notes faxed to [phone]

## 2018-12-01 ENCOUNTER — HOSPITAL ENCOUNTER (OUTPATIENT)
Dept: LAB | Facility: MEDICAL CENTER | Age: 58
End: 2018-12-01
Attending: FAMILY MEDICINE
Payer: COMMERCIAL

## 2018-12-01 DIAGNOSIS — N40.0 BENIGN PROSTATIC HYPERPLASIA WITHOUT LOWER URINARY TRACT SYMPTOMS: ICD-10-CM

## 2018-12-01 DIAGNOSIS — I10 ESSENTIAL HYPERTENSION: ICD-10-CM

## 2018-12-01 LAB
ALBUMIN SERPL BCP-MCNC: 3.9 G/DL (ref 3.2–4.9)
ALBUMIN/GLOB SERPL: 1.2 G/DL
ALP SERPL-CCNC: 133 U/L (ref 30–99)
ALT SERPL-CCNC: 19 U/L (ref 2–50)
ANION GAP SERPL CALC-SCNC: 7 MMOL/L (ref 0–11.9)
AST SERPL-CCNC: 18 U/L (ref 12–45)
BILIRUB SERPL-MCNC: 0.5 MG/DL (ref 0.1–1.5)
BUN SERPL-MCNC: 13 MG/DL (ref 8–22)
CALCIUM SERPL-MCNC: 9.5 MG/DL (ref 8.5–10.5)
CHLORIDE SERPL-SCNC: 106 MMOL/L (ref 96–112)
CHOLEST SERPL-MCNC: 169 MG/DL (ref 100–199)
CO2 SERPL-SCNC: 28 MMOL/L (ref 20–33)
CREAT SERPL-MCNC: 0.9 MG/DL (ref 0.5–1.4)
ERYTHROCYTE [DISTWIDTH] IN BLOOD BY AUTOMATED COUNT: 47.2 FL (ref 35.9–50)
FASTING STATUS PATIENT QL REPORTED: NORMAL
GLOBULIN SER CALC-MCNC: 3.3 G/DL (ref 1.9–3.5)
GLUCOSE SERPL-MCNC: 95 MG/DL (ref 65–99)
HCT VFR BLD AUTO: 44.1 % (ref 42–52)
HDLC SERPL-MCNC: 57 MG/DL
HGB BLD-MCNC: 14.4 G/DL (ref 14–18)
LDLC SERPL CALC-MCNC: 101 MG/DL
MCH RBC QN AUTO: 29.2 PG (ref 27–33)
MCHC RBC AUTO-ENTMCNC: 32.7 G/DL (ref 33.7–35.3)
MCV RBC AUTO: 89.5 FL (ref 81.4–97.8)
PLATELET # BLD AUTO: 257 K/UL (ref 164–446)
PMV BLD AUTO: 11.5 FL (ref 9–12.9)
POTASSIUM SERPL-SCNC: 4 MMOL/L (ref 3.6–5.5)
PROT SERPL-MCNC: 7.2 G/DL (ref 6–8.2)
PSA SERPL-MCNC: 1.58 NG/ML (ref 0–4)
RBC # BLD AUTO: 4.93 M/UL (ref 4.7–6.1)
SODIUM SERPL-SCNC: 141 MMOL/L (ref 135–145)
TRIGL SERPL-MCNC: 55 MG/DL (ref 0–149)
WBC # BLD AUTO: 7.2 K/UL (ref 4.8–10.8)

## 2018-12-01 PROCEDURE — 85027 COMPLETE CBC AUTOMATED: CPT

## 2018-12-01 PROCEDURE — 36415 COLL VENOUS BLD VENIPUNCTURE: CPT

## 2018-12-01 PROCEDURE — 80053 COMPREHEN METABOLIC PANEL: CPT

## 2018-12-01 PROCEDURE — 84153 ASSAY OF PSA TOTAL: CPT

## 2018-12-01 PROCEDURE — 80061 LIPID PANEL: CPT

## 2018-12-10 ENCOUNTER — PATIENT MESSAGE (OUTPATIENT)
Dept: MEDICAL GROUP | Age: 58
End: 2018-12-10

## 2018-12-11 ENCOUNTER — HOSPITAL ENCOUNTER (OUTPATIENT)
Dept: HOSPITAL 8 - CARD | Age: 58
Discharge: HOME | End: 2018-12-11
Attending: FAMILY MEDICINE
Payer: COMMERCIAL

## 2018-12-11 DIAGNOSIS — E78.00: ICD-10-CM

## 2018-12-11 DIAGNOSIS — I10: ICD-10-CM

## 2018-12-11 DIAGNOSIS — G89.29: ICD-10-CM

## 2018-12-11 DIAGNOSIS — M25.562: ICD-10-CM

## 2018-12-11 DIAGNOSIS — G47.33: ICD-10-CM

## 2018-12-11 DIAGNOSIS — R07.89: Primary | ICD-10-CM

## 2018-12-11 PROCEDURE — C8930 TTE W OR W/O CONTR, CONT ECG: HCPCS

## 2018-12-11 PROCEDURE — 93017 CV STRESS TEST TRACING ONLY: CPT

## 2018-12-31 RX ORDER — BISOPROLOL FUMARATE 5 MG/1
TABLET, FILM COATED ORAL
Qty: 90 TAB | Refills: 0 | Status: SHIPPED | OUTPATIENT
Start: 2018-12-31 | End: 2019-05-29 | Stop reason: SDUPTHER

## 2018-12-31 RX ORDER — ATORVASTATIN CALCIUM 20 MG/1
TABLET, FILM COATED ORAL
Qty: 90 TAB | Refills: 0 | Status: SHIPPED | OUTPATIENT
Start: 2018-12-31 | End: 2019-05-29 | Stop reason: SDUPTHER

## 2019-02-20 ENCOUNTER — OFFICE VISIT (OUTPATIENT)
Dept: MEDICAL GROUP | Facility: MEDICAL CENTER | Age: 59
End: 2019-02-20
Payer: COMMERCIAL

## 2019-02-20 VITALS
HEART RATE: 64 BPM | WEIGHT: 283 LBS | DIASTOLIC BLOOD PRESSURE: 88 MMHG | TEMPERATURE: 98 F | SYSTOLIC BLOOD PRESSURE: 136 MMHG | HEIGHT: 70 IN | OXYGEN SATURATION: 94 % | BODY MASS INDEX: 40.52 KG/M2

## 2019-02-20 DIAGNOSIS — M25.562 CHRONIC PAIN OF LEFT KNEE: ICD-10-CM

## 2019-02-20 DIAGNOSIS — G47.33 OSA (OBSTRUCTIVE SLEEP APNEA): ICD-10-CM

## 2019-02-20 DIAGNOSIS — M25.571 CHRONIC PAIN OF RIGHT ANKLE: ICD-10-CM

## 2019-02-20 DIAGNOSIS — G89.29 CHRONIC PAIN OF LEFT KNEE: ICD-10-CM

## 2019-02-20 DIAGNOSIS — G89.29 CHRONIC PAIN OF RIGHT ANKLE: ICD-10-CM

## 2019-02-20 DIAGNOSIS — N20.0 NEPHROLITHIASIS: ICD-10-CM

## 2019-02-20 DIAGNOSIS — I72.8 CELIAC ARTERY ANEURYSM (HCC): ICD-10-CM

## 2019-02-20 DIAGNOSIS — M79.644 FINGER PAIN, RIGHT: ICD-10-CM

## 2019-02-20 PROBLEM — L82.0 INFLAMED SEBORRHEIC KERATOSIS: Status: RESOLVED | Noted: 2017-07-11 | Resolved: 2019-02-20

## 2019-02-20 PROBLEM — R07.89 OTHER CHEST PAIN: Status: RESOLVED | Noted: 2018-11-28 | Resolved: 2019-02-20

## 2019-02-20 PROBLEM — R63.8 INCREASED BMI: Status: RESOLVED | Noted: 2017-02-15 | Resolved: 2019-02-20

## 2019-02-20 PROCEDURE — 99214 OFFICE O/P EST MOD 30 MIN: CPT | Performed by: INTERNAL MEDICINE

## 2019-02-20 ASSESSMENT — PATIENT HEALTH QUESTIONNAIRE - PHQ9: CLINICAL INTERPRETATION OF PHQ2 SCORE: 0

## 2019-02-20 NOTE — LETTER
[facility]  Leigh Bishop M.D.  [address]  [place] [address]  Fax: [phone]   Authorization for Release/Disclosure of   Protected Health Information   Name: JEOVANY RDZ : 1960 SSN: [id number]   Address: [address] [place] [address] [address] [place] [address] Phone:    [phone] (home) [phone] (work)   I authorize the entity listed below to release/disclose the PHI below to:   [facility]/Leigh Bishop M.D. and Leigh Bishop M.D.   Provider or Entity Name:     Address   City, State, Zip   Phone:      Fax:     Reason for request: continuity of care   Information to be released:    [  ] LAST COLONOSCOPY,  including any PATH REPORT and follow-up  [  ] LAST FIT/COLOGUARD RESULT [  ] LAST DEXA  [  ] LAST MAMMOGRAM  [  ] LAST PAP  [  ] LAST LABS [  ] RETINA EXAM REPORT  [  ] IMMUNIZATION RECORDS  [  ] Release all info      [  ] Check here and initial the line next to each item to release ALL health information INCLUDING  _____ Care and treatment for drug and / or alcohol abuse  _____ HIV testing, infection status, or AIDS  _____ Genetic Testing    DATES OF SERVICE OR TIME PERIOD TO BE DISCLOSED: _____________  I understand and acknowledge that:  * This Authorization may be revoked at any time by you in writing, except if your health information has already been used or disclosed.  * Your health information that will be used or disclosed as a result of you signing this authorization could be re-disclosed by the recipient. If this occurs, your re-disclosed health information may no longer be protected by State or Federal laws.  * You may refuse to sign this Authorization. Your refusal will not affect your ability to obtain treatment.  * This Authorization becomes effective upon signing and will  on (date) __________.      If no date is indicated, this Authorization will  one (1) year from the signature date.    Name: Jeovany Rdz    Signature:   Date:          2/20/2019       PLEASE FAX REQUESTED RECORDS BACK TO: [phone]

## 2019-02-20 NOTE — PROGRESS NOTES
CC:  Diagnoses of Chronic pain of left knee, Finger pain, right, DK (obstructive sleep apnea), Celiac artery aneurysm (HCC), and Chronic pain of right ankle were pertinent to this visit.    HISTORY OF THE PRESENT ILLNESS: Patient is a 58 y.o. male. This pleasant patient is here today to establish care and address the following health concerns.      DK (obstructive sleep apnea)  Patient indicates he would like to follow-up with sleep medicine.  He says his CPAP mask seems to pinch his face and is uncomfortable.  He gets approximately 5 hours of sleep per night.  Indicates he wakes up for nocturia about twice per night.  No daytime urinary frequency, urgency, slow stream, etc.  Recent PSA was within normal limits at 1.58 on 12/1/18.  He does drink fluid frequently before bedtime due to dry mouth and he has tried Biotene over-the-counter which was costly so now he is using an over-the-counter spray for bothersome dry mouth.    Celiac artery aneurysm (CMS-HCC)  Patient indicates that his prior internist in [place] was concerned about his celiac artery aneurysm and patient feels he should maintain annual imaging evaluation.  Due to body habitus ultrasound could not be fully interpreted.  CT scan was therefore ordered but due to cost and insurance not covering it patient the patient could not have the CT imaging performed and at this time he would like to see vascular medicine for advice.  Patient is on a statin with lipids reasonably controlled on 12/1/18 total cholesterol 169, triglycerides 55, HDL 57, .  He is also on aspirin and has control of his blood pressure and heart rate on current medications include beta-blocker and amlodipine.  He denies having any abdominal pain today.    Chronic pain of left knee  He says he has had chronic left knee pain for many years.  No acute change or new trauma.  No redness, swelling or inability to weight-bear.  He swims up to an hour a day without joint difficulty.  His  "knee feels better with rest and worse with walking or going up stairs.  Sometimes the knee pops, and clicks.  The knee does not lock or give out.  In the past he got a Gelson injection at [facility] and this was helpful for a year.  He is not interested in physical therapy at this time.  He indicates he wants to follow-up with orthopedic for other treatment considerations which may include injection and if he is a candidate he wishes to have surgery.  He takes approximately 1 Aleve per week and no other over-the-counter medications.  Has tried topicals for his joint symptoms, compression sleeve/braces, etc. conservative measures.  Patient is also interested in weight loss for his joint health but indicates that is difficult with his joint pains.    Similarly he has chronic right ankle pain for many years.  Pain with walking.  No acute change. Some joint swelling locally that resolves in the morning and this has not changed in a year.  Full range of motion of the ankle.  He would like orthopedic to assess his ankle and let him know if there is anything to be done or not.  Takes Advil once a week as per above description.    Right ring finger pain for a year with decreased range of motion bending it.  No acute swelling, redness, heat or known injury.  In the past he received a steroid injection to this region from his orthopedic specialist and this improved his symptoms by approximately 85% per patient.  He would like to follow-up for another injection or other treatment option.    Of note he says that he did have testing for his chest pain that was noted at last visit.  He had a stress test done at [place] this record was found scanned into our system and the report was negative for ischemia.  Patient says the cardiologist called him and told him he was \"fine.  \"Patient continues to swim up to an hour a day without any cardiopulmonary symptoms.  Patient is not concerned about angina at this point in " time.    Allergies: Lisinopril    Current Outpatient Prescriptions Ordered in [facility]   Medication Sig Dispense Refill   • bisoprolol (ZEBETA) 5 MG Tab TAKE 1 TABLET BY MOUTH  EVERY DAY 90 Tab 0   • atorvastatin (LIPITOR) 20 MG Tab TAKE 1 TABLET BY MOUTH  EVERY DAY 90 Tab 0   • loratadine (CLARITIN) 10 MG Tab Take 1 Tab by mouth every day. 90 Tab 1   • fluticasone (FLONASE) 50 MCG/ACT nasal spray Spray 1 Spray in nose every day. 16 g 2   • allopurinol (ZYLOPRIM) 300 MG Tab Take 1 tablet by mouth  every day 90 Tab 0   • amlodipine (NORVASC) 5 MG Tab TAKE 1 TABLET BY MOUTH  EVERY DAY . 90 Tab 0   • Omega-3 Fatty Acids (FISH OIL) 1000 MG Cap capsule Take 1,000 mg by mouth 3 times a day, with meals.     • therapeutic multivitamin-minerals (THERAGRAN-M) Tab Take 1 Tab by mouth every day.     • aspirin EC (ECOTRIN) 81 MG Tablet Delayed Response Take 81 mg by mouth every day.       No current [facility]-ordered facility-administered medications on file.        Past Medical History:   Diagnosis Date   • Celiac artery aneurysm (HCC)    • Chickenpox    • Hypertension    • Kidney stone    • Sleep apnea        Past Surgical History:   Procedure Laterality Date   • KNEE ARTHROSCOPY      repair micscus tear   • LITHOTRIPSY         Social History   Substance Use Topics   • Smoking status: Never Smoker   • Smokeless tobacco: Never Used   • Alcohol use 0.0 oz/week      Comment: very occasionally       Social History     Social History Narrative   • No narrative on file       Family History   Problem Relation Age of Onset   • Cancer Brother         parathyroid   • Sleep Apnea Brother    • Diabetes Brother    • Sleep Apnea Brother    • Diabetes Father        ROS:     - Constitutional: Negative for fever, chills, unexpected weight change, night sweats    - Eyes:  Negative for blurry vision, eye pain, discharge    - ENT:  Negative for hearing changes, ear pain, ear discharge, rhinorrhea, sinus congestion, sore throat     - Respiratory: Negative  "for cough, sputum production, chest congestion, dyspnea, wheezing, and crackles.      - Cardiovascular: Negative for angina    - Gastrointestinal: Negative for abdominal pain    - Genitourinary: Negative for dysuria    - Musculoskeletal: see hpi    - Skin: Negative for rash, itching, cyanotic skin color change.     - Neurological: Negative  vertigo    - Endo:Negative for polyuria, heat/cold intolerance, excessive thirst    - Hem/lymphatic: Negative   swollen glands    -Allergic/immun: Negative for current allergic symptoms    - Psychiatric/Behavioral: Negative for depression, suicidal/homicidal ideation and memory loss.      Exam: Blood pressure 136/88, pulse 64, temperature 36.7 °C (98 °F), temperature source Temporal, height 1.778 m (5' 10\"), weight (!) 128.4 kg (283 lb), SpO2 94 %. Body mass index is 40.61 kg/m².    General: Normal appearing. No distress.  EYES: Conjunctiva clear lids without ptosis, pupils equal  EARS: Normal shape and contour  Pulmonary: Clear to ausculation.  Normal effort. No rales or wheezing.  Cardiovascular: Distant heart sounds due to body habitus.  Regular rate and rhythm without significant murmur.   Abdomen: Soft, nontender, nondistended. Normal bowel sounds.  Neurologic: Cranial nerves grossly nonfocal  Skin: Warm and dry.  No obvious lesions.  Musculoskeletal: Normal gait.  No distal pitting edema.  His bilateral knees there was no increased heat, range of motion was overall intact, slight palpable swelling bilaterally, and no focal tenderness palpation.  Right ankle full range of motion, no focal tenderness to palpation.  Decreased ability to fully bend right ring finger without any heat/erythema.  Psych: Normal mood and affect. Alert and oriented x3. Judgment and insight is normal.      Assessment/Plan  1. Chronic pain of left knee  Offered x-rays, patient declined at this time stating that he would just like to follow-up with orthopedic for treatment options which may include " repeat injection.  -Patient declines physical therapy for joint concerns at this point in time.  -Okay to use NSAIDs sparingly; continue with regular swimming and weight loss efforts  - REFERRAL TO ORTHOPEDICS    2. Finger pain, right  Patient would like to follow-up with orthopedic for repeat injection which helped his symptoms previously.  - REFERRAL TO ORTHOPEDICS    3. DK (obstructive sleep apnea)  - REFERRAL TO SLEEP STUDIES    4. Celiac artery aneurysm (HCC)  -Continue statin, aspirin and double product control with amlodipine and bisoprolol  -Patient is having difficulty with his insurance company having annual imaging performed.  - REFERRAL TO VASCULAR MEDICINE Reason for Referral? Established Vascular Disease    5. Chronic pain of right ankle  Offered x-rays, patient declined.  -Okay to use NSAIDs sparingly; continue with regular swimming and weight loss efforts  - REFERRAL TO ORTHOPEDICS      Return to clinic 3 months    Health maintenance:  Muse guard was reported as negative on 1/2/19  Patient was offered Tdap vaccine today and he will think about it for future visit  PSA was within normal limits at 1.58 on 12/1/18        Please note that this dictation was created using voice recognition software. I have made every reasonable attempt to correct obvious errors, but I expect that there are errors of grammar and possibly content that I did not discover before finalizing the note.

## 2019-02-20 NOTE — ASSESSMENT & PLAN NOTE
Patient indicates he would like to follow-up with sleep medicine.  He says his CPAP mask seems to pinch his face and is uncomfortable.  He gets approximately 5 hours of sleep per night.  Indicates he wakes up for nocturia about twice per night.  No daytime urinary frequency, urgency, slow stream, etc.  Recent PSA was within normal limits at 1.58 on 12/1/18.  He does drink fluid frequently before bedtime due to dry mouth and he has tried Biotene over-the-counter which was costly so now he is using an over-the-counter spray for bothersome dry mouth.

## 2019-02-20 NOTE — ASSESSMENT & PLAN NOTE
He says he has had chronic left knee pain for many years.  No acute change or new trauma.  No redness, swelling or inability to weight-bear.  He swims up to an hour a day without joint difficulty.  His knee feels better with rest and worse with walking or going up stairs.  Sometimes the knee pops, and clicks.  The knee does not lock or give out.  In the past he got a Gelson injection at [facility] and this was helpful for a year.  He is not interested in physical therapy at this time.  He indicates he wants to follow-up with orthopedic for other treatment considerations which may include injection and if he is a candidate he wishes to have surgery.  He takes approximately 1 Aleve per week and no other over-the-counter medications.  Has tried topicals for his joint symptoms, compression sleeve/braces, etc. conservative measures.  Patient is also interested in weight loss for his joint health but indicates that is difficult with his joint pains.

## 2019-02-20 NOTE — ASSESSMENT & PLAN NOTE
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  Due to body habitus ultrasound could not be fully interpreted.  CT scan was therefore ordered but due to cost and insurance not covering it patient the patient could not have the CT imaging performed and at this time he would like to see vascular medicine for advice.  Patient is on a statin with lipids reasonably controlled on 12/1/18 total cholesterol 169, triglycerides 55, HDL 57, .  He is also on aspirin and has control of his blood pressure and heart rate on current medications include beta-blocker and amlodipine.  He denies having any abdominal pain today.

## 2019-03-13 DIAGNOSIS — M25.562 CHRONIC PAIN OF LEFT KNEE: ICD-10-CM

## 2019-03-13 DIAGNOSIS — I10 ESSENTIAL HYPERTENSION: ICD-10-CM

## 2019-03-13 DIAGNOSIS — G89.29 CHRONIC PAIN OF LEFT KNEE: ICD-10-CM

## 2019-03-14 RX ORDER — ALLOPURINOL 300 MG/1
TABLET ORAL
Qty: 90 TAB | Refills: 0 | Status: SHIPPED | OUTPATIENT
Start: 2019-03-14 | End: 2019-05-24 | Stop reason: SDUPTHER

## 2019-03-14 RX ORDER — AMLODIPINE BESYLATE 5 MG/1
TABLET ORAL
Qty: 90 TAB | Refills: 0 | Status: SHIPPED | OUTPATIENT
Start: 2019-03-14 | End: 2019-05-24 | Stop reason: SDUPTHER

## 2019-04-05 ENCOUNTER — SLEEP CENTER VISIT (OUTPATIENT)
Dept: SLEEP MEDICINE | Facility: MEDICAL CENTER | Age: 59
End: 2019-04-05
Payer: COMMERCIAL

## 2019-04-05 VITALS
HEART RATE: 72 BPM | RESPIRATION RATE: 16 BRPM | DIASTOLIC BLOOD PRESSURE: 84 MMHG | WEIGHT: 286.6 LBS | HEIGHT: 70 IN | SYSTOLIC BLOOD PRESSURE: 126 MMHG | BODY MASS INDEX: 41.03 KG/M2 | OXYGEN SATURATION: 92 %

## 2019-04-05 DIAGNOSIS — J30.2 SEASONAL ALLERGIC RHINITIS, UNSPECIFIED TRIGGER: ICD-10-CM

## 2019-04-05 DIAGNOSIS — G47.33 OSA (OBSTRUCTIVE SLEEP APNEA): ICD-10-CM

## 2019-04-05 DIAGNOSIS — J30.2 SEASONAL ALLERGIES: ICD-10-CM

## 2019-04-05 PROCEDURE — 99214 OFFICE O/P EST MOD 30 MIN: CPT | Performed by: NURSE PRACTITIONER

## 2019-04-05 RX ORDER — MONTELUKAST SODIUM 10 MG/1
10 TABLET ORAL EVERY EVENING
Qty: 30 TAB | Refills: 0 | Status: SHIPPED | OUTPATIENT
Start: 2019-04-05 | End: 2019-04-11 | Stop reason: SDUPTHER

## 2019-04-05 ASSESSMENT — ENCOUNTER SYMPTOMS
INSOMNIA: 1
DEPRESSION: 0
CARDIOVASCULAR NEGATIVE: 1
FEVER: 0
NEUROLOGICAL NEGATIVE: 1
NERVOUS/ANXIOUS: 0
HALLUCINATIONS: 0
WEIGHT LOSS: 0
BRUISES/BLEEDS EASILY: 0
MUSCULOSKELETAL NEGATIVE: 1
EYE DISCHARGE: 0
GASTROINTESTINAL NEGATIVE: 1
RESPIRATORY NEGATIVE: 1
EYE PAIN: 0
SORE THROAT: 0
WEAKNESS: 0
SINUS PAIN: 0
MEMORY LOSS: 0
CHILLS: 0
STRIDOR: 0

## 2019-04-05 ASSESSMENT — LIFESTYLE VARIABLES: SUBSTANCE_ABUSE: 0

## 2019-04-05 NOTE — PROGRESS NOTES
Chief Complaint   Patient presents with   • Apnea     Last Seen 07/21/17         HPI: This patient is a 58 y.o. male, who presents for overdue annual follow-up of obstructive sleep apnea.     Home sleep study indicates severe sleep apnea with an AHI of 61.5, supine index 72.7, minimum oxygen saturation of 68 %. He is compliant with auto CPAP 5-15 cm H2O. Compliance download over the past 30 days indicates 93 % compliance, average use of 4 hours 40 minutes per night, AHI of 4.3. Patient reports he is not sure if therapy is working for him.  He has multiple issues with mask interface.  He has significant seasonal allergies and nasal congestion which limits CPAP use.  His wife reports snoring has resolved.  He denies resuscitative gasps or a.m. headaches but continues to have fatigue during the day.  He has questions today regarding his compliance download.  He has Fitbit information for me to review.  He is concerned about the restlessness of his sleep and the different stages of sleep noted on fit bit compliance.  He says he has plenty of supplies from [facility] and does not require anything at this time.  He would however like to try a different mask.      Past Medical History:   Diagnosis Date   • Celiac artery aneurysm (HCC)    • Chickenpox    • Hypertension    • Kidney stone    • Sleep apnea        Social History   Substance Use Topics   • Smoking status: Never Smoker   • Smokeless tobacco: Never Used   • Alcohol use 0.0 oz/week      Comment: very occasionally       Family History   Problem Relation Age of Onset   • Cancer Brother         parathyroid   • Sleep Apnea Brother    • Diabetes Brother    • Sleep Apnea Brother    • Diabetes Father        There is no immunization history for the selected administration types on file for this patient.    Current medications as of today   Current Outpatient Prescriptions   Medication Sig Dispense Refill   • montelukast (SINGULAIR) 10 MG Tab Take 1 Tab by mouth every  "evening. 30 Tab 0   • amLODIPine (NORVASC) 5 MG Tab TAKE 1 TABLET BY MOUTH  EVERY DAY 90 Tab 0   • allopurinol (ZYLOPRIM) 300 MG Tab TAKE 1 TABLET BY MOUTH  EVERY DAY 90 Tab 0   • bisoprolol (ZEBETA) 5 MG Tab TAKE 1 TABLET BY MOUTH  EVERY DAY 90 Tab 0   • atorvastatin (LIPITOR) 20 MG Tab TAKE 1 TABLET BY MOUTH  EVERY DAY 90 Tab 0   • loratadine (CLARITIN) 10 MG Tab Take 1 Tab by mouth every day. 90 Tab 1   • fluticasone (FLONASE) 50 MCG/ACT nasal spray Spray 1 Spray in nose every day. 16 g 2   • Omega-3 Fatty Acids (FISH OIL) 1000 MG Cap capsule Take 1,000 mg by mouth 3 times a day, with meals.     • therapeutic multivitamin-minerals (THERAGRAN-M) Tab Take 1 Tab by mouth every day.     • aspirin EC (ECOTRIN) 81 MG Tablet Delayed Response Take 81 mg by mouth every day.       No current facility-administered medications for this visit.        Allergies: Lisinopril    /84 (BP Location: Left arm, Patient Position: Sitting, BP Cuff Size: Adult)   Pulse 72   Resp 16   Ht 1.778 m (5' 10\")   Wt (!) 130 kg (286 lb 9.6 oz)   SpO2 92%       Review of Systems   Constitutional: Positive for malaise/fatigue. Negative for chills, fever and weight loss.   HENT: Positive for congestion. Negative for ear discharge, ear pain, hearing loss, nosebleeds, sinus pain, sore throat and tinnitus.    Eyes: Negative for pain and discharge.   Respiratory: Negative.  Negative for stridor.    Cardiovascular: Negative.    Gastrointestinal: Negative.    Musculoskeletal: Negative.    Skin: Negative.    Neurological: Negative.  Negative for weakness.   Endo/Heme/Allergies: Negative for environmental allergies. Does not bruise/bleed easily.   Psychiatric/Behavioral: Negative for depression, hallucinations, memory loss, substance abuse and suicidal ideas. The patient has insomnia. The patient is not nervous/anxious.        Physical Exam   Constitutional: He is oriented to person, place, and time and well-developed, well-nourished, and in no " distress.   HENT:   Head: Normocephalic and atraumatic.   Eyes: Pupils are equal, round, and reactive to light.   Neck: Normal range of motion. Neck supple. No tracheal deviation present.   Cardiovascular: Normal rate, regular rhythm and normal heart sounds.    Pulmonary/Chest: Effort normal and breath sounds normal. No respiratory distress. He has no wheezes. He has no rales.   Musculoskeletal: Normal range of motion.   Neurological: He is alert and oriented to person, place, and time.   Skin: Skin is warm and dry.   Psychiatric: Mood, memory, affect and judgment normal.   Vitals reviewed.      Diagnoses/Plan:    1. BMI 40.0-44.9, adult ([facility])  Patient is working on weight loss.  He swims 3 days/week.    2. DK (obstructive sleep apnea)  Compliance shows a normal AHI.  He is encouraged to use his machine longer duration at night at least 6-7 hours.  This may contribute to part of his fatigue.  We will fit him for a new mask in order through his equipment company.  We will obtain overnight oximetry on CPAP prior to his next visit to ensure adequate nocturnal saturations  - DME Mask and Supplies  - DME Mask Fitting; Future  - Overnight Oximetry; Future    3. Seasonal allergic rhinitis, unspecified trigger  Patient is encouraged to use saline rinse, Nasonex daily, continue Claritin, add Singulair for allergies.  He is cautioned on Afrin use.  - montelukast (SINGULAIR) 10 MG Tab; Take 1 Tab by mouth every evening.  Dispense: 30 Tab; Refill: 0    4. Seasonal allergies  - montelukast (SINGULAIR) 10 MG Tab; Take 1 Tab by mouth every evening.  Dispense: 30 Tab; Refill: 0    Follow-up in 3 months, sooner if needed    This dictation was created using voice recognition software. The accuracy of the dictation is limited to the abilities of the software. I expect there may be some errors of grammar and possibly content.

## 2019-04-11 DIAGNOSIS — J30.2 SEASONAL ALLERGIES: ICD-10-CM

## 2019-04-11 DIAGNOSIS — J30.2 SEASONAL ALLERGIC RHINITIS, UNSPECIFIED TRIGGER: ICD-10-CM

## 2019-04-11 RX ORDER — MONTELUKAST SODIUM 10 MG/1
TABLET ORAL
Qty: 90 TAB | Refills: 0 | Status: SHIPPED | OUTPATIENT
Start: 2019-04-11 | End: 2019-06-21 | Stop reason: SDUPTHER

## 2019-04-11 NOTE — TELEPHONE ENCOUNTER
Caller Name: Jhonatan Fonseca                 Call Back Number: [phone] (home) [phone] (work)        Patient approves a detailed voicemail message: N\A    Have we ever prescribed this med? Yes.  If yes, what date? 4/5/19 was not a 90 day     Last OV: 4/5/19 CHRISTINE Arredondo     Next OV: NO PENDING APPT   Follow-up in 3 months, sooner if needed     DX: SEASONAL ALLERGIES     Medications:  Current Outpatient Prescriptions   Medication Sig Dispense Refill   • montelukast (SINGULAIR) 10 MG Tab Take 1 Tab by mouth every evening. 30 Tab 0   • amLODIPine (NORVASC) 5 MG Tab TAKE 1 TABLET BY MOUTH  EVERY DAY 90 Tab 0   • allopurinol (ZYLOPRIM) 300 MG Tab TAKE 1 TABLET BY MOUTH  EVERY DAY 90 Tab 0   • bisoprolol (ZEBETA) 5 MG Tab TAKE 1 TABLET BY MOUTH  EVERY DAY 90 Tab 0   • atorvastatin (LIPITOR) 20 MG Tab TAKE 1 TABLET BY MOUTH  EVERY DAY 90 Tab 0   • loratadine (CLARITIN) 10 MG Tab Take 1 Tab by mouth every day. 90 Tab 1   • fluticasone (FLONASE) 50 MCG/ACT nasal spray Spray 1 Spray in nose every day. 16 g 2   • Omega-3 Fatty Acids (FISH OIL) 1000 MG Cap capsule Take 1,000 mg by mouth 3 times a day, with meals.     • therapeutic multivitamin-minerals (THERAGRAN-M) Tab Take 1 Tab by mouth every day.     • aspirin EC (ECOTRIN) 81 MG Tablet Delayed Response Take 81 mg by mouth every day.       No current facility-administered medications for this visit.

## 2019-04-19 ENCOUNTER — NON-PROVIDER VISIT (OUTPATIENT)
Dept: MEDICAL GROUP | Facility: MEDICAL CENTER | Age: 59
End: 2019-04-19
Payer: COMMERCIAL

## 2019-04-19 ENCOUNTER — TELEPHONE (OUTPATIENT)
Dept: MEDICAL GROUP | Facility: MEDICAL CENTER | Age: 59
End: 2019-04-19

## 2019-04-19 DIAGNOSIS — Z23 NEED FOR TDAP VACCINATION: ICD-10-CM

## 2019-04-19 PROCEDURE — 90471 IMMUNIZATION ADMIN: CPT | Performed by: PHYSICIAN ASSISTANT

## 2019-04-19 PROCEDURE — 90715 TDAP VACCINE 7 YRS/> IM: CPT | Performed by: PHYSICIAN ASSISTANT

## 2019-04-22 ENCOUNTER — OFFICE VISIT (OUTPATIENT)
Dept: VASCULAR LAB | Facility: MEDICAL CENTER | Age: 59
End: 2019-04-22
Attending: INTERNAL MEDICINE
Payer: COMMERCIAL

## 2019-04-22 VITALS
HEART RATE: 65 BPM | BODY MASS INDEX: 40.4 KG/M2 | WEIGHT: 282.2 LBS | DIASTOLIC BLOOD PRESSURE: 86 MMHG | HEIGHT: 70 IN | SYSTOLIC BLOOD PRESSURE: 122 MMHG

## 2019-04-22 DIAGNOSIS — I10 ESSENTIAL HYPERTENSION: ICD-10-CM

## 2019-04-22 DIAGNOSIS — G47.33 OSA (OBSTRUCTIVE SLEEP APNEA): ICD-10-CM

## 2019-04-22 DIAGNOSIS — E78.00 PURE HYPERCHOLESTEROLEMIA: ICD-10-CM

## 2019-04-22 DIAGNOSIS — I72.8 CELIAC ARTERY ANEURYSM (HCC): ICD-10-CM

## 2019-04-22 PROCEDURE — 99212 OFFICE O/P EST SF 10 MIN: CPT

## 2019-04-22 PROCEDURE — 99204 OFFICE O/P NEW MOD 45 MIN: CPT | Performed by: INTERNAL MEDICINE

## 2019-04-22 ASSESSMENT — ENCOUNTER SYMPTOMS
BLURRED VISION: 0
FOCAL WEAKNESS: 0
COUGH: 0
LOSS OF CONSCIOUSNESS: 0
PALPITATIONS: 0
SENSORY CHANGE: 0
HEADACHES: 0
DOUBLE VISION: 0
SHORTNESS OF BREATH: 0
BACK PAIN: 1
DIZZINESS: 0
SPEECH CHANGE: 0
GASTROINTESTINAL NEGATIVE: 1
WEIGHT LOSS: 0
NERVOUS/ANXIOUS: 0
DEPRESSION: 0

## 2019-04-22 NOTE — PROGRESS NOTES
INITIAL VASCULAR VISIT  Subjective:   Jhonatan Fonseca is a 58 y.o. male who presents today 4/22/2019 for   Chief Complaint   Patient presents with   • Follow-Up     HPI:  Patient referred for eval and management of celiac artery aneurysm in setting of htn and dyslipidemia  Found incidently in approx 2008 on ct for kidney stones  Has had serial CTs with no growth - last was about 4 years ago - 1.8 cm  No abdominal pain  No other vascular disease  BP usually <130/80 at home  Rare leg edema  On bisoprolol and amlodipine  No myalgias on statin   Takes asa  No smoking  On cpap for reena    Past Medical History:   Diagnosis Date   • Celiac artery aneurysm (HCC)    • Chickenpox    • Dyslipidemia    • Hypertension    • Kidney stone    • Sleep apnea      Past Surgical History:   Procedure Laterality Date   • KNEE ARTHROSCOPY      repair micscus tear   • LITHOTRIPSY       Family History   Problem Relation Age of Onset   • Cancer Brother         parathyroid   • Sleep Apnea Brother    • Diabetes Brother    • Sleep Apnea Brother    • Diabetes Father      History   Smoking Status   • Never Smoker   Smokeless Tobacco   • Never Used     Social History   Substance Use Topics   • Smoking status: Never Smoker   • Smokeless tobacco: Never Used   • Alcohol use 0.0 oz/week      Comment: very occasionally     Outpatient Encounter Prescriptions as of 4/22/2019   Medication Sig Dispense Refill   • montelukast (SINGULAIR) 10 MG Tab TAKE 1 TABLET BY MOUTH  EVERY EVENING 90 Tab 0   • amLODIPine (NORVASC) 5 MG Tab TAKE 1 TABLET BY MOUTH  EVERY DAY 90 Tab 0   • allopurinol (ZYLOPRIM) 300 MG Tab TAKE 1 TABLET BY MOUTH  EVERY DAY 90 Tab 0   • bisoprolol (ZEBETA) 5 MG Tab TAKE 1 TABLET BY MOUTH  EVERY DAY 90 Tab 0   • atorvastatin (LIPITOR) 20 MG Tab TAKE 1 TABLET BY MOUTH  EVERY DAY 90 Tab 0   • loratadine (CLARITIN) 10 MG Tab Take 1 Tab by mouth every day. 90 Tab 1   • fluticasone (FLONASE) 50 MCG/ACT nasal spray Spray 1 Spray in nose every  "day. 16 g 2   • Omega-3 Fatty Acids (FISH OIL) 1000 MG Cap capsule Take 1,000 mg by mouth 3 times a day, with meals.     • therapeutic multivitamin-minerals (THERAGRAN-M) Tab Take 1 Tab by mouth every day.     • aspirin EC (ECOTRIN) 81 MG Tablet Delayed Response Take 81 mg by mouth every day.       No facility-administered encounter medications on file as of 4/22/2019.      Allergies   Allergen Reactions   • Lisinopril Hives     DIET AND EXERCISE:  Weight Change: stable  Diet: starting portion control  Exercise: 3-5x per week - mostly swimming    Review of Systems   Constitutional: Positive for malaise/fatigue. Negative for weight loss.   HENT: Negative for hearing loss and tinnitus.    Eyes: Negative for blurred vision and double vision.   Respiratory: Negative for cough and shortness of breath.    Cardiovascular: Negative for chest pain, palpitations and leg swelling.   Gastrointestinal: Negative.    Genitourinary: Negative.    Musculoskeletal: Positive for back pain and joint pain.   Skin: Negative for itching and rash.   Neurological: Negative for dizziness, sensory change, speech change, focal weakness, loss of consciousness and headaches.   Psychiatric/Behavioral: Negative for depression. The patient is not nervous/anxious.       Objective:     Vitals:    04/22/19 1359 04/22/19 1403   BP: 143/74 122/86   BP Location: Left arm Left arm   Patient Position: Sitting Sitting   BP Cuff Size: Large adult Large adult   Pulse: 64 65   Weight: (!) 128 kg (282 lb 3.2 oz)    Height: 1.77 m (5' 9.69\")       Body mass index is 40.86 kg/m².  Physical Exam   Constitutional: He is oriented to person, place, and time. He appears well-developed and well-nourished. No distress.   HENT:   Head: Normocephalic and atraumatic.   Eyes: Pupils are equal, round, and reactive to light. Conjunctivae and EOM are normal. No scleral icterus.   Neck: Normal range of motion. Neck supple. No JVD present. No thyromegaly present. "   Cardiovascular: Normal rate, regular rhythm, normal heart sounds and intact distal pulses.  Exam reveals no gallop and no friction rub.    No murmur heard.  Pulmonary/Chest: Breath sounds normal. No respiratory distress. He has no wheezes. He has no rales.   Abdominal: Soft. Bowel sounds are normal. He exhibits no distension and no mass. There is no tenderness. There is no rebound.   Musculoskeletal: Normal range of motion. He exhibits no edema or tenderness.   Neurological: He is alert and oriented to person, place, and time. No cranial nerve deficit. Coordination normal.   Skin: Skin is warm and dry. No rash noted. He is not diaphoretic.   Psychiatric: He has a normal mood and affect. His behavior is normal.   Vitals reviewed.    Lab Results   Component Value Date    CHOLSTRLTOT 169 12/01/2018     (H) 12/01/2018    HDL 57 12/01/2018    TRIGLYCERIDE 55 12/01/2018           Lab Results   Component Value Date    SODIUM 141 12/01/2018    POTASSIUM 4.0 12/01/2018    CHLORIDE 106 12/01/2018    CO2 28 12/01/2018    GLUCOSE 95 12/01/2018    BUN 13 12/01/2018    CREATININE 0.90 12/01/2018    IFAFRICA >60 12/01/2018    IFNOTAFR >60 12/01/2018        Lab Results   Component Value Date    WBC 7.2 12/01/2018    RBC 4.93 12/01/2018    HEMOGLOBIN 14.4 12/01/2018    HEMATOCRIT 44.1 12/01/2018    MCV 89.5 12/01/2018    MCH 29.2 12/01/2018    MCHC 32.7 (L) 12/01/2018    MPV 11.5 12/01/2018      Cta april 2010  Celiac aneurysm 1.8 cm    Visceral vascular duplex nov 2016:  1.  Exceedingly limited assessment due to body habitus and bowel gas  2.  For diagnostic assessment either CTA or MRI would be necessary    Medical Decision Making:  Today's Assessment / Status / Plan:     1. Celiac artery aneurysm (HCC)  CT-CTA ABDOMEN WITH & W/O-POST PROCESS   2. Pure hypercholesterolemia     3. Essential hypertension     4. DK (obstructive sleep apnea)       Patient Type: Secondary Prevention    Etiology of Established CVD if Present:  Celiac artery aneurysm    Lipid Management: Qualifies for Statin Therapy Based on 2013 ACC/AHA Guidelines: yes  Calculated 10-Year Risk of ASCVD: N/A  Currently on Statin: Yes  Has vascular disease, but not necessarily atherosclerotic  Would like to see LDL <100 and nonHDL <130  - continue current dose of atorva  - recheck fasting lipid per pcp  - continue intensification if above threshold in future    Blood Pressure Management:  Acc/aha bp goal <130/80  Sounds like borderline control both in office and at home  Had hives on aceI  - continue amlodipine and bisoprolol for now  - lifestyle mod per below  - restart home bp monitoring  - consider addition of arb if above goal at home    Glycemic Status: Normal  - recheck fasting glucose    Anti-Platelet/Anti-Coagulant Tx: yes  - continue low dose asa    Smoking: continue complete avoidance    Physical Activity:  Continue swimming    Weight Management and Nutrition:  Low carb diet with goal 30 pounds in next 6 months or so    Other:     1. Celiac artery aneurysm - found incidently. Asymptomatic. Would not consider intervention unless at least 2cm. Unable to see well on u/s  - check CTA  - will determine surveillance schedule based on results    2.  DK - continue cpap.  Otherwise defer management to pcp and sleep center    Instructed to follow-up with PCP for remainder of adult medical needs: yes  We will partner with other providers in the management of established vascular disease and cardiometabolic risk factors.    Studies to Be Obtained: CTA abdomen  Labs to Be Obtained: per pcp    Follow up in: 6-8 weeks    Michael J Bloch, M.D.     Cc:  GUERO Bishop

## 2019-05-07 ENCOUNTER — TELEPHONE (OUTPATIENT)
Dept: VASCULAR LAB | Facility: MEDICAL CENTER | Age: 59
End: 2019-05-07

## 2019-05-07 NOTE — TELEPHONE ENCOUNTER
Spoke with the pt about his CTA. He needs to have it done at [facility]. Order sent to [facility]. Pt given the phone number to call them to schedule. LON Maurer.

## 2019-05-08 ENCOUNTER — TELEPHONE (OUTPATIENT)
Dept: VASCULAR LAB | Facility: MEDICAL CENTER | Age: 59
End: 2019-05-08

## 2019-05-08 DIAGNOSIS — I72.8 CELIAC ARTERY ANEURYSM (HCC): ICD-10-CM

## 2019-05-22 ENCOUNTER — OFFICE VISIT (OUTPATIENT)
Dept: MEDICAL GROUP | Facility: MEDICAL CENTER | Age: 59
End: 2019-05-22
Payer: COMMERCIAL

## 2019-05-22 VITALS
DIASTOLIC BLOOD PRESSURE: 68 MMHG | SYSTOLIC BLOOD PRESSURE: 122 MMHG | TEMPERATURE: 98.9 F | HEART RATE: 77 BPM | RESPIRATION RATE: 12 BRPM | OXYGEN SATURATION: 92 % | HEIGHT: 70 IN | WEIGHT: 281.4 LBS | BODY MASS INDEX: 40.29 KG/M2

## 2019-05-22 DIAGNOSIS — M25.562 CHRONIC PAIN OF LEFT KNEE: ICD-10-CM

## 2019-05-22 DIAGNOSIS — G89.29 CHRONIC PAIN OF LEFT KNEE: ICD-10-CM

## 2019-05-22 DIAGNOSIS — G47.33 OSA (OBSTRUCTIVE SLEEP APNEA): ICD-10-CM

## 2019-05-22 DIAGNOSIS — Z00.00 PREVENTATIVE HEALTH CARE: ICD-10-CM

## 2019-05-22 DIAGNOSIS — Z12.5 ENCOUNTER FOR SCREENING FOR MALIGNANT NEOPLASM OF PROSTATE: ICD-10-CM

## 2019-05-22 DIAGNOSIS — R53.82 CHRONIC FATIGUE: ICD-10-CM

## 2019-05-22 PROCEDURE — 99214 OFFICE O/P EST MOD 30 MIN: CPT | Performed by: INTERNAL MEDICINE

## 2019-05-22 NOTE — PROGRESS NOTES
CC:  Diagnoses of BMI 40.0-44.9, adult (HCC), Chronic fatigue, Encounter for screening for malignant neoplasm of prostate, Preventative health care, Chronic pain of left knee, and DK (obstructive sleep apnea) were pertinent to this visit.    HISTORY OF THE PRESENT ILLNESS: Patient is a 58 y.o. male. This pleasant patient is here today to follow-up.    Indicates he met with the orthopedic specialist for his knee and ankle pain and they recommended weight loss.  They referred him to bariatric surgery.  He would like to go to our health improvement program initially to work on nutrition and other advice.  He continues to swim 45 minutes or longer multiple times per week.  Other gym activities hurt his joints.    He is getting a new CPAP mask in July to treat his sleep apnea.  As an adult he indicates he averages 6 hours of sleep nightly.    Met with vascular medicine regarding his celiac artery aneurysm, pending CT scan results.  Denies any new abdominal pain or other new complaints.    Overall his right hand is feeling better he has full range of movement, he wishes to monitor this conservatively and let me know.    Medications for seasonal allergies worked well.    Complains of chronic fatigue, would like some additional labs to include testosterone, thyroid, etc.    Allergies: Lisinopril    Current Outpatient Prescriptions Ordered in [facility]   Medication Sig Dispense Refill   • Psyllium (FIBER) 0.52 GM Cap Take  by mouth.     • montelukast (SINGULAIR) 10 MG Tab TAKE 1 TABLET BY MOUTH  EVERY EVENING 90 Tab 0   • amLODIPine (NORVASC) 5 MG Tab TAKE 1 TABLET BY MOUTH  EVERY DAY 90 Tab 0   • allopurinol (ZYLOPRIM) 300 MG Tab TAKE 1 TABLET BY MOUTH  EVERY DAY 90 Tab 0   • bisoprolol (ZEBETA) 5 MG Tab TAKE 1 TABLET BY MOUTH  EVERY DAY 90 Tab 0   • atorvastatin (LIPITOR) 20 MG Tab TAKE 1 TABLET BY MOUTH  EVERY DAY 90 Tab 0   • loratadine (CLARITIN) 10 MG Tab Take 1 Tab by mouth every day. 90 Tab 1   • fluticasone (FLONASE) 50  MCG/ACT nasal spray Spray 1 Spray in nose every day. 16 g 2   • Omega-3 Fatty Acids (FISH OIL) 1000 MG Cap capsule Take 1,000 mg by mouth 3 times a day, with meals.     • therapeutic multivitamin-minerals (THERAGRAN-M) Tab Take 1 Tab by mouth every day.     • aspirin EC (ECOTRIN) 81 MG Tablet Delayed Response Take 81 mg by mouth every day.       No current [facility]-ordered facility-administered medications on file.        Past Medical History:   Diagnosis Date   • Celiac artery aneurysm (HCC)    • Chickenpox    • Dyslipidemia    • Hypertension    • Kidney stone    • Sleep apnea        Past Surgical History:   Procedure Laterality Date   • KNEE ARTHROSCOPY      repair micscus tear   • LITHOTRIPSY         Social History   Substance Use Topics   • Smoking status: Never Smoker   • Smokeless tobacco: Never Used   • Alcohol use 0.0 oz/week      Comment: very occasionally       Social History     Social History Narrative   • No narrative on file       Family History   Problem Relation Age of Onset   • Cancer Brother         parathyroid   • Sleep Apnea Brother    • Diabetes Brother    • Sleep Apnea Brother    • Diabetes Father        ROS:     - Constitutional: Negative for fever, chills    - Eyes:   Negative for blurry vision, eye pain, discharge    - ENT:  Negative for hearing changes, ear pain, ear discharge, rhinorrhea, sinus congestion, sore throat     - Respiratory: Negative for cough, sputum production, chest congestion, dyspnea, wheezing, and crackles.      - Cardiovascular: Negative for chest pain, palpitations, orthopnea, and bilateral lower extremity edema.     - Gastrointestinal: Negative for heartburn, nausea, vomiting, abdominal pain, hematochezia, melena, diarrhea, constipation, and greasy/foul-smelling stools.     - Genitourinary: Negative for dysuria    - Musculoskeletal: see hpi    - Skin: Negative for rash, itching, cyanotic skin color change.     - Neurological: Negative for vertigo    - Endo:Negative for  "polyuria, heat/cold intolerance, excessive thirst    - Hem/lymphatic: Negative for swollen glands    -Allergic/immun: allergies symptoms controlled    - Psychiatric/Behavioral: Negative for depression, suicidal/homicidal ideation and memory loss.      Exam: /68 (BP Location: Right arm, Patient Position: Sitting, BP Cuff Size: Adult long)   Pulse 77   Temp 37.2 °C (98.9 °F) (Temporal)   Resp 12   Ht 1.77 m (5' 9.69\")   Wt (!) 127.6 kg (281 lb 6.4 oz)   SpO2 92%  Body mass index is 40.74 kg/m².    General: Normal appearing. No distress.  EYES: Conjunctiva clear lids without ptosis, pupils equal  EARS: Normal shape and contour   Neurologic: Cranial nerves grossly nonfocal  Skin: Warm and dry.  No obvious lesions.  Musculoskeletal: Normal gait. No extremity cyanosis, clubbing, or edema.  Psych: Normal mood and affect. Alert and oriented x3. Judgment and insight is normal.    Assessment/Plan  1. BMI 40.0-44.9, adult (HCC)  stable  - REFERRAL TO [facility] (HIP) Services Requested: Weight Management Program, Physician Medical Weight Management Program; Reason for Referral? BMI>30; Reason for Visit: Overweight/Obesity; Future    2. Chronic fatigue  Stable and chronic  - VITAMIN D,25 HYDROXY; Future  - TSH WITH REFLEX TO FT4; Future  - VITAMIN B12; Future  - TESTOSTERONE F&T MALE ADULT; Future    3. Encounter for screening for malignant neoplasm of prostate  - PROSTATE SPECIFIC AG SCREENING; Future    4. Preventative health care  - Comp Metabolic Panel; Future  - CBC WITH DIFFERENTIAL; Future  - Lipid Profile; Future  - PROSTATE SPECIFIC AG SCREENING; Future    5. Chronic pain of left knee  Stable and chronic  F/u ortho and wt loss    6. DK (obstructive sleep apnea)  Stable and chronic  Pending sleep f/u and new mask      rtc 6mo or prn        Please note that this dictation was created using voice recognition software. I have made every reasonable attempt to correct obvious errors, but " I expect that there are errors of grammar and possibly content that I did not discover before finalizing the note.

## 2019-05-24 DIAGNOSIS — I10 ESSENTIAL HYPERTENSION: ICD-10-CM

## 2019-05-24 DIAGNOSIS — G89.29 CHRONIC PAIN OF LEFT KNEE: ICD-10-CM

## 2019-05-24 DIAGNOSIS — M25.562 CHRONIC PAIN OF LEFT KNEE: ICD-10-CM

## 2019-05-25 NOTE — TELEPHONE ENCOUNTER
Was the patient seen in the last year in this department? Yes lov 5/22/19    Does patient have an active prescription for medications requested? No     Received Request Via: Pharmacy

## 2019-05-28 RX ORDER — ALLOPURINOL 300 MG/1
TABLET ORAL
Qty: 90 TAB | Refills: 0 | Status: SHIPPED | OUTPATIENT
Start: 2019-05-28 | End: 2019-08-05 | Stop reason: SDUPTHER

## 2019-05-28 RX ORDER — AMLODIPINE BESYLATE 5 MG/1
TABLET ORAL
Qty: 90 TAB | Refills: 0 | Status: SHIPPED | OUTPATIENT
Start: 2019-05-28 | End: 2019-08-05 | Stop reason: SDUPTHER

## 2019-05-30 RX ORDER — BISOPROLOL FUMARATE 5 MG/1
TABLET, FILM COATED ORAL
Qty: 100 TAB | Refills: 0 | Status: SHIPPED | OUTPATIENT
Start: 2019-05-30 | End: 2019-08-05 | Stop reason: SDUPTHER

## 2019-05-30 RX ORDER — ATORVASTATIN CALCIUM 20 MG/1
TABLET, FILM COATED ORAL
Qty: 100 TAB | Refills: 0 | Status: SHIPPED | OUTPATIENT
Start: 2019-05-30 | End: 2019-08-05 | Stop reason: SDUPTHER

## 2019-06-03 ENCOUNTER — TELEPHONE (OUTPATIENT)
Dept: VASCULAR LAB | Facility: MEDICAL CENTER | Age: 59
End: 2019-06-03

## 2019-06-04 NOTE — TELEPHONE ENCOUNTER
CTA from [place] [facility] reviewed.  CTA demonstrates celiac aneurysm increased in size to 2.6 cm max diameter.  Will d/w patient at 6/17 appt, but will likely send to vascular surgery for consideration of intervention.    Michael Bloch, MD  Vascular Care

## 2019-06-17 ENCOUNTER — OFFICE VISIT (OUTPATIENT)
Dept: VASCULAR LAB | Facility: MEDICAL CENTER | Age: 59
End: 2019-06-17
Attending: INTERNAL MEDICINE
Payer: COMMERCIAL

## 2019-06-17 VITALS
WEIGHT: 280.6 LBS | BODY MASS INDEX: 40.17 KG/M2 | SYSTOLIC BLOOD PRESSURE: 119 MMHG | HEIGHT: 70 IN | HEART RATE: 71 BPM | DIASTOLIC BLOOD PRESSURE: 82 MMHG

## 2019-06-17 DIAGNOSIS — I72.8 CELIAC ARTERY ANEURYSM (HCC): ICD-10-CM

## 2019-06-17 DIAGNOSIS — E78.00 PURE HYPERCHOLESTEROLEMIA: ICD-10-CM

## 2019-06-17 DIAGNOSIS — I10 ESSENTIAL HYPERTENSION: ICD-10-CM

## 2019-06-17 DIAGNOSIS — G47.33 OSA (OBSTRUCTIVE SLEEP APNEA): ICD-10-CM

## 2019-06-17 PROCEDURE — 99212 OFFICE O/P EST SF 10 MIN: CPT

## 2019-06-17 PROCEDURE — 99214 OFFICE O/P EST MOD 30 MIN: CPT | Performed by: INTERNAL MEDICINE

## 2019-06-17 ASSESSMENT — ENCOUNTER SYMPTOMS
BACK PAIN: 1
DIZZINESS: 0
COUGH: 0
SHORTNESS OF BREATH: 0
SENSORY CHANGE: 0
MYALGIAS: 0
HEADACHES: 0
DEPRESSION: 0
SPEECH CHANGE: 0
FOCAL WEAKNESS: 0
NERVOUS/ANXIOUS: 0
PALPITATIONS: 0

## 2019-06-17 NOTE — PROGRESS NOTES
"  Follow up VASCULAR VISIT  Subjective:   Jhonatan Fonseca is a 58 y.o. 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   Respiratory: Negative for cough and shortness of breath.    Cardiovascular: Negative for chest pain, palpitations and leg swelling.   Musculoskeletal: Positive for back pain and joint pain. Negative for myalgias.   Neurological: Negative for dizziness, sensory change, speech change, focal weakness and headaches.   Psychiatric/Behavioral: Negative for depression. The patient is not nervous/anxious.       Objective:     Vitals:    06/17/19 1624 06/17/19 1629   BP: 134/78 119/82   BP Location: Left arm Left arm   Patient Position: Sitting Sitting   BP Cuff Size: Large adult Large adult   Pulse: 74 71   Weight: (!) 127.3 kg (280 lb 9.6 oz)    Height: 1.77 m (5' 9.69\")       Body mass index is 40.63 kg/m².  Physical Exam   Constitutional: He is oriented to person, place, and time. No distress.   Cardiovascular: Normal rate, regular rhythm, normal heart sounds and intact distal pulses.    No murmur heard.  Pulmonary/Chest: Effort normal and breath sounds normal. No respiratory distress. He has no wheezes. He has no rales.   Abdominal: He exhibits no mass.   Musculoskeletal: Normal range of motion. He exhibits no edema.   Neurological: He is alert and oriented to person, place, and time. No cranial " nerve deficit. Coordination normal.   Skin: He is not diaphoretic.   Psychiatric: He has a normal mood and affect. His behavior is normal.   Vitals reviewed.    Lab Results   Component Value Date    CHOLSTRLTOT 169 12/01/2018     (H) 12/01/2018    HDL 57 12/01/2018    TRIGLYCERIDE 55 12/01/2018           Lab Results   Component Value Date    SODIUM 141 12/01/2018    POTASSIUM 4.0 12/01/2018    CHLORIDE 106 12/01/2018    CO2 28 12/01/2018    GLUCOSE 95 12/01/2018    BUN 13 12/01/2018    CREATININE 0.90 12/01/2018    IFAFRICA >60 12/01/2018    IFNOTAFR >60 12/01/2018        Lab Results   Component Value Date    WBC 7.2 12/01/2018    RBC 4.93 12/01/2018    HEMOGLOBIN 14.4 12/01/2018    HEMATOCRIT 44.1 12/01/2018    MCV 89.5 12/01/2018    MCH 29.2 12/01/2018    MCHC 32.7 (L) 12/01/2018    MPV 11.5 12/01/2018      Cta april 2010  Celiac aneurysm 1.8 cm    Visceral vascular duplex nov 2016:  1.  Exceedingly limited assessment due to body habitus and bowel gas  2.  For diagnostic assessment either CTA or MRI would be necessary    CTA abdomen at [facility] may 2019  Celiac trunk aneurysm 2 x 2.6 cm in dimension; saccular approximately 15 mm above celiac takeoff  Minimal calcific atherosclerotic change in the abdominal aorta    Medical Decision Making:  Today's Assessment / Status / Plan:     1. Celiac artery aneurysm (HCC)  REFERRAL TO VASCULAR SURGERY   2. Essential hypertension     3. Pure hypercholesterolemia     4. DK (obstructive sleep apnea)       Patient Type: Secondary Prevention    Etiology of Established CVD if Present: Celiac artery aneurysm    Lipid Management: Qualifies for Statin Therapy Based on 2013 ACC/AHA Guidelines: yes  Calculated 10-Year Risk of ASCVD: N/A  Currently on Statin: Yes  Has vascular disease, but not necessarily atherosclerotic  Would like to see LDL <100 and nonHDL <130  Decent control on most recent labs  Plan:  - continue current dose of atorva  - recheck fasting lipid per  pcp  - continue intensification if above threshold in future    Blood Pressure Management:  Acc/aha bp goal <130/80  Reasonable control both at home and in the office  Had hives on aceI  - continue amlodipine and bisoprolol for now  - lifestyle mod per below  - restart home bp monitoring  - consider addition of arb if above goal at home    Glycemic Status: Normal  -Continue lifestyle modification    Anti-Platelet/Anti-Coagulant Tx: yes  - continue low dose asa    Smoking: continue complete avoidance    Physical Activity:  Continue swimming    Weight Management and Nutrition:  Low carb diet with goal 30 pounds in next 6 months or so    Other:     1. Celiac artery aneurysm - found incidently. Asymptomatic.  Now increased in size to 2.6 cm based on radiology report  -Will refer to vascular surgery for consideration of repair  - will determine surveillance schedule based on results of that consultation  -Continue medical management as above    2.  DK - continue cpap.  Otherwise defer management to pcp and sleep center    Instructed to follow-up with PCP for remainder of adult medical needs: yes  We will partner with other providers in the management of established vascular disease and cardiometabolic risk factors.    Studies to Be Obtained: None currently  Labs to Be Obtained: per pcp    Follow up in: To be determined after vascular surgery consultation    Michael J Bloch, M.D.     Cc:    GUERO Morales

## 2019-06-21 DIAGNOSIS — J30.2 SEASONAL ALLERGIES: ICD-10-CM

## 2019-06-21 DIAGNOSIS — J30.2 SEASONAL ALLERGIC RHINITIS, UNSPECIFIED TRIGGER: ICD-10-CM

## 2019-06-21 RX ORDER — MONTELUKAST SODIUM 10 MG/1
10 TABLET ORAL DAILY
Qty: 90 TAB | Refills: 1 | Status: ON HOLD | OUTPATIENT
Start: 2019-06-21 | End: 2019-08-15

## 2019-06-21 NOTE — TELEPHONE ENCOUNTER
Received refill request for 90 day supply of montelukast from [facility] last seen 4/5/19 with Renetta BENEDICT. Please renew.

## 2019-06-25 ENCOUNTER — TELEPHONE (OUTPATIENT)
Dept: MEDICAL GROUP | Facility: MEDICAL CENTER | Age: 59
End: 2019-06-25

## 2019-06-25 NOTE — TELEPHONE ENCOUNTER
1. Caller Name: Jhonatan Fonseca                                           Call Back Number: [phone] (home) [phone] (work)        Patient approves a detailed voicemail message: N\A    Called to inform patient we have received the records from his cardiologists

## 2019-06-25 NOTE — TELEPHONE ENCOUNTER
----- Message from Leigh Bishop M.D. sent at 6/25/2019  8:51 AM PDT -----  Regarding: FW: Prescription Question  Contact: [phone]    Patient would like us to notify him when we receive his cardiology records.  Kindly let the patient know when we have received the records.      ----- Message -----  From: Jhonatan Fonseca  Sent: 6/25/2019   8:03 AM  To: Leigh Bishop M.D.  Subject: RE: Prescription Question                        Thank you.  Also, I put in the request to [facility] to forward to you the resulsts of my Stress test. It should arrive this week to your office, either via fax (my requested method) or US Mail.    If someone can let me know if it gets received I'd appreciate it so I don't have to follow up with them.  ----- Message -----  From: Leigh Bishop M.D.  Sent: 6/24/2019  5:48 PM PDT  To: Jhonatan Fonseca  Subject: RE: Prescription Question  Alfredo Israel, we will review your medications and see if any of them can cause a rash with sun exposure.  Please give us a few days, as we may need to cross reference with the pharmacist.  Take Care, Dr. ELKINS        ----- Message -----     From: Jhonatan Fonseca     Sent: 6/24/2019  3:52 PM PDT       To: Leigh Bishop M.D.  Subject: Prescription Question    Alfredo Bishop. Can you please review my meds I'm taking and tell me if any would cause a rash due to sun exposure?  This is the second time within the last few weeks where I've been out in the sun and my lower legs are suffering from rash-like symptoms - bumps and itchiness.  I used sunscreen these past several days in which I was outside watching baseball games - 2 of 3 games were at 9AM and not very hot.    I'm using skin moisturizer to help for now - its not very serious but with the summer just beginning, I want to know my game plan going forward.    Thanks!    Alan Fonseca

## 2019-06-28 ENCOUNTER — TELEPHONE (OUTPATIENT)
Dept: MEDICAL GROUP | Facility: MEDICAL CENTER | Age: 59
End: 2019-06-28

## 2019-06-28 NOTE — TELEPHONE ENCOUNTER
1. Caller Name: Jhonatan Fonseca       Call Back Number: [phone] (home) [phone] (work)        Patient approves a detailed voicemail message: N\A    Called and confirm that the medication won't cause rash sun exposure.

## 2019-07-08 ENCOUNTER — TELEPHONE (OUTPATIENT)
Dept: VASCULAR LAB | Facility: MEDICAL CENTER | Age: 59
End: 2019-07-08

## 2019-07-09 NOTE — TELEPHONE ENCOUNTER
Received records of evaluation from vascular surgery.  Vascular surgery is recommending open repair of celiac artery aneurysm.  Will defer to that recommendation.  Await further recommendations after procedure    Michael Bloch, MD  Vascular Medicine

## 2019-07-18 ENCOUNTER — OFFICE VISIT (OUTPATIENT)
Dept: HEALTH INFORMATION MANAGEMENT | Facility: MEDICAL CENTER | Age: 59
End: 2019-07-18
Payer: COMMERCIAL

## 2019-07-18 VITALS
HEART RATE: 76 BPM | HEIGHT: 70 IN | DIASTOLIC BLOOD PRESSURE: 70 MMHG | BODY MASS INDEX: 40.29 KG/M2 | WEIGHT: 281.4 LBS | SYSTOLIC BLOOD PRESSURE: 128 MMHG | OXYGEN SATURATION: 93 %

## 2019-07-18 DIAGNOSIS — N20.0 NEPHROLITHIASIS: ICD-10-CM

## 2019-07-18 DIAGNOSIS — R53.82 CHRONIC FATIGUE: ICD-10-CM

## 2019-07-18 DIAGNOSIS — I10 ESSENTIAL HYPERTENSION: ICD-10-CM

## 2019-07-18 DIAGNOSIS — G47.33 OSA (OBSTRUCTIVE SLEEP APNEA): ICD-10-CM

## 2019-07-18 DIAGNOSIS — E78.00 PURE HYPERCHOLESTEROLEMIA: ICD-10-CM

## 2019-07-18 PROCEDURE — 93000 ELECTROCARDIOGRAM COMPLETE: CPT | Performed by: INTERNAL MEDICINE

## 2019-07-18 PROCEDURE — 99215 OFFICE O/P EST HI 40 MIN: CPT | Mod: 25 | Performed by: INTERNAL MEDICINE

## 2019-07-18 PROCEDURE — 97802 MEDICAL NUTRITION INDIV IN: CPT | Performed by: DIETITIAN, REGISTERED

## 2019-07-18 NOTE — PROGRESS NOTES
"7/18/2019   Referring Provider: GINA Ibanez 59 y.o.        Time in/out: 2:24-2:52    Anthropometrics/Objective  Vitals:    07/18/19 1349   BP: 128/70   Weight: (!) 127.6 kg (281 lb 6.4 oz)   Height: 1.778 m (5' 10\")     BMI: Body mass index is 40.38 kg/m².  Stated Goal Weight: 225 lb  See comprehensive patient history form for further information     Subjective:  Pt is here today for the initial screening visit for the medical weight management program.      - Here with his wife, Fara, who is his \"accountability\" partner   - Patient does the shopping generally   - Making own shakes in AM   - Tends to snack quite a bit, mostly on refined CHO   - Has been trying to keep his own food and calorie journal and is interested in learning how to use an miguel for this   - Swims 4 x week   - Stress eats at work at his desk    See Medical Questionnaire for more detailed diet history     B - protein shake w/ honey, banana and whole milk  S - cherries or none  L - 3 slices bologna on sandwich roll and 2 cups watermelon or sushi roll or above protein shake w/ 1 tangelo   S - 2 cups watermelon and granola bar or none  D - 2 tacos w/ refried beans and cheese, 1 cup rice and 2 cups watermelon or gypsy game hen w/ stuffing, aditi and 2 cups watermelon or hamburger w/ 2 cheese slices, polish sausage on bun and mashed potato  S - brownie and peanuts in shell or orange creamsicle and peanuts or 2 small Yi's bars and a creamsicle and peanuts    Nutrition Diagnosis (PES Statement)  · Obesity related to excessive energy intake and inadequate energy expenditure as evidenced by BMI >30     Client history:  Condition(s) associated with a diagnosis or treatment (specify) HTN, DK, pure hypercholesterolemia, chronic fatigue, kidney stones    Biochemical data, medical test and procedures  Lab Results   Component Value Date/Time    HBA1C 6.2 (H) 09/21/2016 08:08 AM     No results found for: POCGLUCOSE  Lab " Results   Component Value Date/Time    SAJAN 169 12/01/2018 07:24 AM     (H) 12/01/2018 07:24 AM    HDL 57 12/01/2018 07:24 AM    TRIGLYCERIDE 55 12/01/2018 07:24 AM         Nutrition Intervention  Nutrition Prescription  Recommended Daily Kcals: 1600-1900Kcal based on REE of 2081   Recommended Daily Protein: 100g or more per day per Dr. Stoll   Recommended Daily CHO: 100g or less per day per Dr. Stoll     Meal Plan Recommendation   Low calorie, high protein/low CHO diet with 1 meal replacements per day per Dr. Stoll     Comprehensive Nutrition Education Instruction or training leading to in-depth nutrition related knowledge about:   Benefits to following meal plan, Combine carb, protein and fat at each meal, Meal timing and spacing, Portion control, Sweets and alcohol in moderation, and tracking and meal replacements   Handouts provided regarding topics discussed:   - LCD Plan   - MyPlate   - Snack list w/ fruit   - Meal ideas   - MyFitnessPal How-To     Monitoring & Evaluation Plan    Behavioral-Environmental:  Behavior: Keep a food journal and bring to next appointment   Physical activity: did not discuss    Food / Nutrient Intake:  Food intake: Follow meal plan as discussed. Avoid concentrated sweets and processed carbs. Use the plate method for portion control and macronutrient balance. Limit carbs/starch to up to 1/2 cup per meal. Snacks should be 1oz protein + ns veggies or fruit. Fruit once per day.     Fluid intake: Consume at least 64 oz water per day. Avoid all sweetened beverages. Limit coffee to 1 cup a day (ideally no cream or sugar). Avoid alcohol.      Physical Signs / Symptoms:  Lipid profiles WNL and Weight change towards BMI <30 and BP WNL    Assessment Notes:  Today I oriented Jhonatan to Dr. Stoll's Medical Weight Management program. Discussed higher protein, lower CHO and calorie controlled meal plan, optional but encouraged use of meal replacements, 3 meals and 2  snacks per day as well as calorie/macro tracking. He will be using meal replacements for breakfast, will make his own with product at home. We discussed how to build protein into each meal and snack, incorporating 1/2 plate non-starchy vegetable twice daily, fruit 1 x day, optional 1/2 cup of complex CHO at meals if desired, avoiding trigger foods and snacks consisting of protein and optional non-starchy vegetable or serving of fruit.     He will be tracking using MyLogi-ServePal and aiming for ~1750 kcal/d.  He will also be looking at the macros feature and aim for 30%or less of CHO and 30% or more of protein per day per Dr. Stoll requirements/recommendations.    F/U: 2 week w/ RD and 4-6 weeks with MD and BRANDI

## 2019-07-18 NOTE — PROGRESS NOTES
"Bariatric Medicine H&P  Chief Complaint   Patient presents with   • Weight Gain       Referred by:  Leigh Bishop M.D.    History of Present Illness:   Jhonatan Fonseca is a 59 y.o.  male who presents for weight management with his wife and to help address co-morbidities caused by overweight, as below.    The patient would like to improve his general health, sleep apnea, knee and ankle pain.  He has not been part of a formal weight loss program in the past, has not been on anti-obesity medication.  Has never stuck to keeping a food diary in the past.  Gradual wt gain.    Brief Diet History (see also RD notes):  AM: Oatmeal, muffin, eggs, protein shake from a large can, adds honey/banana/whole milk  Lunch: [place], eats out  Dinner: Meat, leftovers  Snacks: Many, all high in sugar such as ice cream, cookies  Drinks: Mostly water    He really wants to reduce his fat mass percent.  HTN: Blood pressure has been controlled on bisoprolol, amlodipine  HLD: On atorvastatin, with last  12/2018  History of nephrolithiasis:  Celiac aneurysm surgery pending.    Behavior-Related History:  Binge eating screen: Negative  H/o abuse: Negative     Exercise:   Swimming 3 times per week x60 minutes each     Review of Systems   Positive for knee arthritis.  Denies fatigue, sweats or chills, shortness of breath, back pain or muscle cramps, nausea or vomiting.  Sleep apnea screen: Positive, on CPAP  All other ROS were reviewed with patient today and are negative.      PMH/PSH:  I have reviewed the patient's medical, social and family history, allergies, and medications today.  Prior records reviewed.  Personal Hx of Bariatric Surgery: None      Physical Exam:   /70 (BP Location: Left arm, Patient Position: Sitting, BP Cuff Size: Large adult)   Pulse 76   Ht 1.778 m (5' 10\")   Wt (!) 127.6 kg (281 lb 6.4 oz)   SpO2 93%   BMI 40.38 kg/m²   Waist Measurement   Waist: 53.5 inch/inches  Body fat % 40  REE 2081 " kcal/day    Constitutional: Oriented to person, place, and time and well-developed, well-nourished, and in no distress.    HENT: No facial plethora.  No Cushingoid features.  No scalloped tongue.  No dental erosions.  No swollen parotids.  Head: Normocephalic.   Eyes: EOM are normal. Pupils are equal, round, and reactive to light. No periorbital edema.  No lateral thinning of eyebrows.  No vertical nystagmus.  Neck: Normal range of motion. Neck supple. No thyromegaly present. No buffalo hump.  Cardiovascular: Normal rate and regular rhythm.  No murmur heard.  Pulmonary/Chest: Effort normal and breath sounds normal. No wheezes.   Abdominal: Soft. Bowel sounds are normal. Grade 2 pannus.  No ascites.  No hepatosplenomegaly.   Musculoskeletal: Normal range of motion. No edema.   Neurological: Alert and oriented to person, place, and time. Normal reflexes. No cranial nerve deficit. No muscle weakness.  Gait normal.   Skin: Warm and dry. Not diaphoretic.  No acanthosis nigricans.  Not excessively dry, scaly.  No acne.  No bruising/ecchymosis.  No hyperpigmentation.  No xanthomas or acrochordon.    Psychiatric: Mood, memory, affect and judgment normal.     Laboratory:   Prior labs reviewed.  Labs pending tomorrow.  EKG: Sinus rhythm, rate 75, no ST-T changes.  Corrected QT 0.428  Ordered and reviewed by me today.    Dietitian Assessment: I have reviewed the Dietitian's assessment related to this encounter.       ASSESSMENT/PLAN:  Body mass index is 40.38 kg/m².   Obesity Stage ([place]) 2; Class 3    1. Essential hypertension     2. DK (obstructive sleep apnea)     3. Pure hypercholesterolemia     4. Chronic fatigue     5. Nephrolithiasis     6. BMI 40.0-44.9, adult (HCC)         Given the patient's food recall, total kcal and CHO intake likely very high, especially given his current metabolic rate.  Will work on tracking, total kcal and CHO reduction as below.  Improved stimulus narrowing.  Consider anti-obesity  medication pending course but patient defers at this time.  Stay adequately hydrated given history of nephrolithiasis.  Fatigue, lipids, sleep should all improve with weight loss.  Blood pressure currently controlled.    The patient and I have discussed at length and agree to the following recommendations, which are all addressing the above diagnoses:    Weight Goal: 5% wt loss at one month after start (pt goal weight is <225 lb)  Diet:     MR: 1/day, without banana/honey/whole milk  High Protein/Low Carb Meals and 2 snacks between meals daily  >100 g protein, <100 g total carbs daily  64+ oz water per day  Maintain adequate hydration given history of nephrolithiasis  Avoid excessive processed carbohydrate snacks  Track daily intake with My Fitness Pal, bring to next visit  Physical Activity: Swimming 3 days/week x 60 minutes minimum  Risk level for moderate/vigorous exercise program:   Low  New Rx:   None.  Consider anti-obesity medication pending course  Avoid phentermine/topiramate given history of nephrolithiasis  Behavior change:   More mindful about food choices  Follow-up: one month with MD, 2 wks with RD    Face to face time spent 60 minutes,  with >50% of time devoted to one on one counseling on weight management issues, as documented above.      Patient's body mass index is 40.38 kg/m². Exercise and nutrition counseling were performed at this visit.        Thank you for your referral!

## 2019-07-22 ENCOUNTER — HOSPITAL ENCOUNTER (OUTPATIENT)
Dept: LAB | Facility: MEDICAL CENTER | Age: 59
End: 2019-07-22
Attending: INTERNAL MEDICINE
Payer: COMMERCIAL

## 2019-07-22 DIAGNOSIS — Z00.00 PREVENTATIVE HEALTH CARE: ICD-10-CM

## 2019-07-22 DIAGNOSIS — R53.82 CHRONIC FATIGUE: ICD-10-CM

## 2019-07-22 DIAGNOSIS — Z12.5 ENCOUNTER FOR SCREENING FOR MALIGNANT NEOPLASM OF PROSTATE: ICD-10-CM

## 2019-07-22 LAB
25(OH)D3 SERPL-MCNC: 24 NG/ML (ref 30–100)
BASOPHILS # BLD AUTO: 1 % (ref 0–1.8)
BASOPHILS # BLD: 0.06 K/UL (ref 0–0.12)
EOSINOPHIL # BLD AUTO: 0.12 K/UL (ref 0–0.51)
EOSINOPHIL NFR BLD: 2 % (ref 0–6.9)
ERYTHROCYTE [DISTWIDTH] IN BLOOD BY AUTOMATED COUNT: 46.5 FL (ref 35.9–50)
HCT VFR BLD AUTO: 47.9 % (ref 42–52)
HGB BLD-MCNC: 15.2 G/DL (ref 14–18)
IMM GRANULOCYTES # BLD AUTO: 0.01 K/UL (ref 0–0.11)
IMM GRANULOCYTES NFR BLD AUTO: 0.2 % (ref 0–0.9)
LYMPHOCYTES # BLD AUTO: 1.72 K/UL (ref 1–4.8)
LYMPHOCYTES NFR BLD: 29.3 % (ref 22–41)
MCH RBC QN AUTO: 28.1 PG (ref 27–33)
MCHC RBC AUTO-ENTMCNC: 31.7 G/DL (ref 33.7–35.3)
MCV RBC AUTO: 88.7 FL (ref 81.4–97.8)
MONOCYTES # BLD AUTO: 0.96 K/UL (ref 0–0.85)
MONOCYTES NFR BLD AUTO: 16.3 % (ref 0–13.4)
NEUTROPHILS # BLD AUTO: 3.01 K/UL (ref 1.82–7.42)
NEUTROPHILS NFR BLD: 51.2 % (ref 44–72)
NRBC # BLD AUTO: 0 K/UL
NRBC BLD-RTO: 0 /100 WBC
PLATELET # BLD AUTO: 223 K/UL (ref 164–446)
PMV BLD AUTO: 10.9 FL (ref 9–12.9)
PSA SERPL-MCNC: 1.31 NG/ML (ref 0–4)
RBC # BLD AUTO: 5.4 M/UL (ref 4.7–6.1)
TSH SERPL DL<=0.005 MIU/L-ACNC: 4.22 UIU/ML (ref 0.38–5.33)
VIT B12 SERPL-MCNC: 348 PG/ML (ref 211–911)
WBC # BLD AUTO: 5.9 K/UL (ref 4.8–10.8)

## 2019-07-22 PROCEDURE — 84153 ASSAY OF PSA TOTAL: CPT

## 2019-07-22 PROCEDURE — 36415 COLL VENOUS BLD VENIPUNCTURE: CPT

## 2019-07-22 PROCEDURE — 82607 VITAMIN B-12: CPT

## 2019-07-22 PROCEDURE — 84270 ASSAY OF SEX HORMONE GLOBUL: CPT

## 2019-07-22 PROCEDURE — 85025 COMPLETE CBC W/AUTO DIFF WBC: CPT

## 2019-07-22 PROCEDURE — 84443 ASSAY THYROID STIM HORMONE: CPT

## 2019-07-22 PROCEDURE — 80053 COMPREHEN METABOLIC PANEL: CPT

## 2019-07-22 PROCEDURE — 84403 ASSAY OF TOTAL TESTOSTERONE: CPT

## 2019-07-22 PROCEDURE — 80061 LIPID PANEL: CPT

## 2019-07-22 PROCEDURE — 82306 VITAMIN D 25 HYDROXY: CPT

## 2019-07-23 LAB
ALBUMIN SERPL BCP-MCNC: 3.9 G/DL (ref 3.2–4.9)
ALBUMIN/GLOB SERPL: 1.1 G/DL
ALP SERPL-CCNC: 130 U/L (ref 30–99)
ALT SERPL-CCNC: 33 U/L (ref 2–50)
ANION GAP SERPL CALC-SCNC: 10 MMOL/L (ref 0–11.9)
AST SERPL-CCNC: 30 U/L (ref 12–45)
BILIRUB SERPL-MCNC: 0.3 MG/DL (ref 0.1–1.5)
BUN SERPL-MCNC: 17 MG/DL (ref 8–22)
CALCIUM SERPL-MCNC: 10.1 MG/DL (ref 8.5–10.5)
CHLORIDE SERPL-SCNC: 106 MMOL/L (ref 96–112)
CHOLEST SERPL-MCNC: 146 MG/DL (ref 100–199)
CO2 SERPL-SCNC: 26 MMOL/L (ref 20–33)
CREAT SERPL-MCNC: 0.92 MG/DL (ref 0.5–1.4)
GLOBULIN SER CALC-MCNC: 3.4 G/DL (ref 1.9–3.5)
GLUCOSE SERPL-MCNC: 98 MG/DL (ref 65–99)
HDLC SERPL-MCNC: 42 MG/DL
LDLC SERPL CALC-MCNC: 90 MG/DL
POTASSIUM SERPL-SCNC: 4.2 MMOL/L (ref 3.6–5.5)
PROT SERPL-MCNC: 7.3 G/DL (ref 6–8.2)
SHBG SERPL-SCNC: 68 NMOL/L (ref 11–80)
SODIUM SERPL-SCNC: 142 MMOL/L (ref 135–145)
TESTOST FREE MFR SERPL: 1.1 % (ref 1.6–2.9)
TESTOST FREE SERPL-MCNC: 43 PG/ML (ref 47–244)
TESTOST SERPL-MCNC: 373 NG/DL (ref 300–720)
TRIGL SERPL-MCNC: 68 MG/DL (ref 0–149)

## 2019-07-30 ENCOUNTER — PATIENT MESSAGE (OUTPATIENT)
Dept: MEDICAL GROUP | Facility: MEDICAL CENTER | Age: 59
End: 2019-07-30

## 2019-07-30 DIAGNOSIS — E55.9 VITAMIN D DEFICIENCY: ICD-10-CM

## 2019-07-30 RX ORDER — ERGOCALCIFEROL 1.25 MG/1
50000 CAPSULE ORAL
Qty: 12 CAP | Refills: 1 | Status: SHIPPED | OUTPATIENT
Start: 2019-07-30 | End: 2019-12-09

## 2019-08-01 ENCOUNTER — NON-PROVIDER VISIT (OUTPATIENT)
Dept: HEALTH INFORMATION MANAGEMENT | Facility: MEDICAL CENTER | Age: 59
End: 2019-08-01
Payer: COMMERCIAL

## 2019-08-01 VITALS — HEIGHT: 70 IN | WEIGHT: 274.9 LBS | BODY MASS INDEX: 39.35 KG/M2

## 2019-08-01 PROCEDURE — 97803 MED NUTRITION INDIV SUBSEQ: CPT | Performed by: DIETITIAN, REGISTERED

## 2019-08-01 NOTE — PROGRESS NOTES
"Nutrition Reassess: Medical Weight Management   Today's date: 8/1/2019  Referring Provider: No ref. provider found      Time: in/out 9:05-9:27 am   Visit#: 2    Subjective:  - started My Fitness Pal which has been very helpful and eye opening   - average kcals 4238-8669, meeting weekly goals of <100g carbohydrate, >100g protein   - generally not feeling hungry, satisfied  - keeping a detailed health/nutrition folder as well as tracking exercise  - swimming during the week  - decreased bread intake     Diet history:   *see pt's journal     Anthropometrics/Objective  Today's weight:    Vitals:    08/01/19 0937   Weight: 124.7 kg (274 lb 14.4 oz)   Height: 1.778 m (5' 10\")     BMI:  Body mass index is 39.44 kg/m².  Starting weight/date 281.4 lbs (7/18/19)      Total weight change : -6.5 lbs            Meal Plan:   High protein low carb diet with 0-1 meal replacements per day per Dr. Beasley      ReAssesment/Notes:  Jhonatan is committed to making changes. He has been tracking his intake, is making better food choices, eating smaller portions if out and in general, and exercising regularly. Applauded him for his dedication to lifestyle change. Briefly discussed sodium intake which he was concerned with being high (his miguel is set at 1500 mg goal) however his average is 2000 mg or less which I think is a great improvement from what he was potentially having before. May discuss low sodium options in more detail at a future visit. Weight loss and regular PA should also help with BP reduction which is actively doing. Recommended ideas for increasing NS veggies and meeting fiber needs (ie If having a carbohydrate choose whole grains such as brown rice, w/w bread). No changes to his plan. Encouraged consistently going forward.     Follow-up: 4-6 weeks     "

## 2019-08-05 DIAGNOSIS — I10 ESSENTIAL HYPERTENSION: ICD-10-CM

## 2019-08-05 DIAGNOSIS — J30.2 SEASONAL ALLERGIC RHINITIS, UNSPECIFIED TRIGGER: ICD-10-CM

## 2019-08-05 DIAGNOSIS — M25.562 CHRONIC PAIN OF LEFT KNEE: ICD-10-CM

## 2019-08-05 DIAGNOSIS — G89.29 CHRONIC PAIN OF LEFT KNEE: ICD-10-CM

## 2019-08-06 RX ORDER — ATORVASTATIN CALCIUM 20 MG/1
TABLET, FILM COATED ORAL
Qty: 90 TAB | Refills: 1 | Status: ON HOLD | OUTPATIENT
Start: 2019-08-06 | End: 2019-08-15

## 2019-08-06 RX ORDER — AMLODIPINE BESYLATE 5 MG/1
TABLET ORAL
Qty: 90 TAB | Refills: 1 | Status: SHIPPED | OUTPATIENT
Start: 2019-08-06 | End: 2020-02-19 | Stop reason: SDUPTHER

## 2019-08-06 RX ORDER — FLUTICASONE PROPIONATE 50 MCG
SPRAY, SUSPENSION (ML) NASAL
Qty: 16 G | Refills: 2 | Status: SHIPPED | OUTPATIENT
Start: 2019-08-06 | End: 2020-02-28 | Stop reason: SDUPTHER

## 2019-08-06 RX ORDER — ALLOPURINOL 300 MG/1
TABLET ORAL
Qty: 90 TAB | Refills: 2 | Status: ON HOLD | OUTPATIENT
Start: 2019-08-06 | End: 2019-08-15

## 2019-08-06 RX ORDER — BISOPROLOL FUMARATE 5 MG/1
TABLET, FILM COATED ORAL
Qty: 90 TAB | Refills: 1 | Status: SHIPPED | OUTPATIENT
Start: 2019-08-06 | End: 2019-10-10

## 2019-08-15 ENCOUNTER — APPOINTMENT (OUTPATIENT)
Dept: RADIOLOGY | Facility: MEDICAL CENTER | Age: 59
DRG: 272 | End: 2019-08-15
Attending: ANESTHESIOLOGY
Payer: COMMERCIAL

## 2019-08-15 ENCOUNTER — HOSPITAL ENCOUNTER (INPATIENT)
Facility: MEDICAL CENTER | Age: 59
LOS: 3 days | DRG: 272 | End: 2019-08-18
Attending: SURGERY | Admitting: SURGERY
Payer: COMMERCIAL

## 2019-08-15 ENCOUNTER — ANESTHESIA (OUTPATIENT)
Dept: SURGERY | Facility: MEDICAL CENTER | Age: 59
DRG: 272 | End: 2019-08-15
Payer: COMMERCIAL

## 2019-08-15 ENCOUNTER — ANESTHESIA EVENT (OUTPATIENT)
Dept: SURGERY | Facility: MEDICAL CENTER | Age: 59
DRG: 272 | End: 2019-08-15
Payer: COMMERCIAL

## 2019-08-15 DIAGNOSIS — G89.18 POSTOPERATIVE PAIN: ICD-10-CM

## 2019-08-15 DIAGNOSIS — I72.8 CELIAC ARTERY ANEURYSM (HCC): Primary | ICD-10-CM

## 2019-08-15 LAB
ABO + RH BLD: NORMAL
ABO GROUP BLD: NORMAL
ALBUMIN SERPL BCP-MCNC: 3.2 G/DL (ref 3.2–4.9)
ALBUMIN/GLOB SERPL: 1.1 G/DL
ALP SERPL-CCNC: 95 U/L (ref 30–99)
ALT SERPL-CCNC: 124 U/L (ref 2–50)
ANION GAP SERPL CALC-SCNC: 8 MMOL/L (ref 0–11.9)
AST SERPL-CCNC: 104 U/L (ref 12–45)
BILIRUB SERPL-MCNC: 0.6 MG/DL (ref 0.1–1.5)
BLD GP AB SCN SERPL QL: NORMAL
BUN SERPL-MCNC: 17 MG/DL (ref 8–22)
CALCIUM SERPL-MCNC: 8.6 MG/DL (ref 8.5–10.5)
CHLORIDE SERPL-SCNC: 111 MMOL/L (ref 96–112)
CO2 SERPL-SCNC: 25 MMOL/L (ref 20–33)
CREAT SERPL-MCNC: 0.87 MG/DL (ref 0.5–1.4)
ERYTHROCYTE [DISTWIDTH] IN BLOOD BY AUTOMATED COUNT: 50.4 FL (ref 35.9–50)
GLOBULIN SER CALC-MCNC: 2.9 G/DL (ref 1.9–3.5)
GLUCOSE SERPL-MCNC: 198 MG/DL (ref 65–99)
HCT VFR BLD AUTO: 38.9 % (ref 42–52)
HGB BLD-MCNC: 12.8 G/DL (ref 14–18)
MCH RBC QN AUTO: 29.2 PG (ref 27–33)
MCHC RBC AUTO-ENTMCNC: 32.9 G/DL (ref 33.7–35.3)
MCV RBC AUTO: 88.6 FL (ref 81.4–97.8)
PLATELET # BLD AUTO: 261 K/UL (ref 164–446)
PMV BLD AUTO: 11.2 FL (ref 9–12.9)
POTASSIUM SERPL-SCNC: 3.8 MMOL/L (ref 3.6–5.5)
PROT SERPL-MCNC: 6.1 G/DL (ref 6–8.2)
RBC # BLD AUTO: 4.39 M/UL (ref 4.7–6.1)
RH BLD: NORMAL
SODIUM SERPL-SCNC: 144 MMOL/L (ref 135–145)
WBC # BLD AUTO: 19.8 K/UL (ref 4.8–10.8)

## 2019-08-15 PROCEDURE — 700102 HCHG RX REV CODE 250 W/ 637 OVERRIDE(OP): Performed by: SURGERY

## 2019-08-15 PROCEDURE — 700111 HCHG RX REV CODE 636 W/ 250 OVERRIDE (IP): Performed by: SURGERY

## 2019-08-15 PROCEDURE — C1894 INTRO/SHEATH, NON-LASER: HCPCS | Performed by: SURGERY

## 2019-08-15 PROCEDURE — 86900 BLOOD TYPING SEROLOGIC ABO: CPT

## 2019-08-15 PROCEDURE — 85027 COMPLETE CBC AUTOMATED: CPT

## 2019-08-15 PROCEDURE — 502703 HCHG DEVICE, LIGASURE V SEALER: Performed by: SURGERY

## 2019-08-15 PROCEDURE — 99233 SBSQ HOSP IP/OBS HIGH 50: CPT | Performed by: SURGERY

## 2019-08-15 PROCEDURE — 700105 HCHG RX REV CODE 258: Performed by: SURGERY

## 2019-08-15 PROCEDURE — 71045 X-RAY EXAM CHEST 1 VIEW: CPT

## 2019-08-15 PROCEDURE — A9270 NON-COVERED ITEM OR SERVICE: HCPCS | Performed by: SURGERY

## 2019-08-15 PROCEDURE — 700105 HCHG RX REV CODE 258: Performed by: ANESTHESIOLOGY

## 2019-08-15 PROCEDURE — 501838 HCHG SUTURE GENERAL: Performed by: SURGERY

## 2019-08-15 PROCEDURE — 160002 HCHG RECOVERY MINUTES (STAT): Performed by: SURGERY

## 2019-08-15 PROCEDURE — C1768 GRAFT, VASCULAR: HCPCS | Performed by: SURGERY

## 2019-08-15 PROCEDURE — C1725 CATH, TRANSLUMIN NON-LASER: HCPCS | Performed by: SURGERY

## 2019-08-15 PROCEDURE — 700101 HCHG RX REV CODE 250

## 2019-08-15 PROCEDURE — 86901 BLOOD TYPING SEROLOGIC RH(D): CPT

## 2019-08-15 PROCEDURE — 700111 HCHG RX REV CODE 636 W/ 250 OVERRIDE (IP): Performed by: ANESTHESIOLOGY

## 2019-08-15 PROCEDURE — 160035 HCHG PACU - 1ST 60 MINS PHASE I: Performed by: SURGERY

## 2019-08-15 PROCEDURE — 700101 HCHG RX REV CODE 250: Performed by: SURGERY

## 2019-08-15 PROCEDURE — 36415 COLL VENOUS BLD VENIPUNCTURE: CPT

## 2019-08-15 PROCEDURE — 501837 HCHG SUTURE CV: Performed by: SURGERY

## 2019-08-15 PROCEDURE — 500424 HCHG DRESSING, AIRSTRIP: Performed by: SURGERY

## 2019-08-15 PROCEDURE — 160048 HCHG OR STATISTICAL LEVEL 1-5: Performed by: SURGERY

## 2019-08-15 PROCEDURE — 04R10JZ REPLACEMENT OF CELIAC ARTERY WITH SYNTHETIC SUBSTITUTE, OPEN APPROACH: ICD-10-PCS | Performed by: SURGERY

## 2019-08-15 PROCEDURE — 770022 HCHG ROOM/CARE - ICU (200)

## 2019-08-15 PROCEDURE — 86850 RBC ANTIBODY SCREEN: CPT

## 2019-08-15 PROCEDURE — 501445 HCHG STAPLER, SKIN DISP: Performed by: SURGERY

## 2019-08-15 PROCEDURE — 80053 COMPREHEN METABOLIC PANEL: CPT

## 2019-08-15 PROCEDURE — 160041 HCHG SURGERY MINUTES - EA ADDL 1 MIN LEVEL 4: Performed by: SURGERY

## 2019-08-15 PROCEDURE — A4314 CATH W/DRAINAGE 2-WAY LATEX: HCPCS | Performed by: SURGERY

## 2019-08-15 PROCEDURE — 700101 HCHG RX REV CODE 250: Performed by: ANESTHESIOLOGY

## 2019-08-15 PROCEDURE — 160029 HCHG SURGERY MINUTES - 1ST 30 MINS LEVEL 4: Performed by: SURGERY

## 2019-08-15 PROCEDURE — 160009 HCHG ANES TIME/MIN: Performed by: SURGERY

## 2019-08-15 DEVICE — GRAFT HEMASHIELD MDV 8MM 15CM: Type: IMPLANTABLE DEVICE | Status: FUNCTIONAL

## 2019-08-15 RX ORDER — HYDRALAZINE HYDROCHLORIDE 20 MG/ML
10 INJECTION INTRAMUSCULAR; INTRAVENOUS
Status: DISCONTINUED | OUTPATIENT
Start: 2019-08-15 | End: 2019-08-16

## 2019-08-15 RX ORDER — HYDROMORPHONE HYDROCHLORIDE 1 MG/ML
0.2 INJECTION, SOLUTION INTRAMUSCULAR; INTRAVENOUS; SUBCUTANEOUS
Status: DISCONTINUED | OUTPATIENT
Start: 2019-08-15 | End: 2019-08-15

## 2019-08-15 RX ORDER — HYDROMORPHONE HYDROCHLORIDE 1 MG/ML
0.4 INJECTION, SOLUTION INTRAMUSCULAR; INTRAVENOUS; SUBCUTANEOUS
Status: DISCONTINUED | OUTPATIENT
Start: 2019-08-15 | End: 2019-08-15

## 2019-08-15 RX ORDER — ONDANSETRON 2 MG/ML
4 INJECTION INTRAMUSCULAR; INTRAVENOUS EVERY 4 HOURS PRN
Status: DISCONTINUED | OUTPATIENT
Start: 2019-08-15 | End: 2019-08-18 | Stop reason: HOSPADM

## 2019-08-15 RX ORDER — SCOLOPAMINE TRANSDERMAL SYSTEM 1 MG/1
1 PATCH, EXTENDED RELEASE TRANSDERMAL
Status: DISCONTINUED | OUTPATIENT
Start: 2019-08-15 | End: 2019-08-16

## 2019-08-15 RX ORDER — DIPHENHYDRAMINE HYDROCHLORIDE 50 MG/ML
12.5 INJECTION INTRAMUSCULAR; INTRAVENOUS
Status: DISCONTINUED | OUTPATIENT
Start: 2019-08-15 | End: 2019-08-15 | Stop reason: HOSPADM

## 2019-08-15 RX ORDER — ROCURONIUM BROMIDE 10 MG/ML
INJECTION, SOLUTION INTRAVENOUS PRN
Status: DISCONTINUED | OUTPATIENT
Start: 2019-08-15 | End: 2019-08-15 | Stop reason: HOSPADM

## 2019-08-15 RX ORDER — HALOPERIDOL 5 MG/ML
1 INJECTION INTRAMUSCULAR
Status: DISCONTINUED | OUTPATIENT
Start: 2019-08-15 | End: 2019-08-15 | Stop reason: HOSPADM

## 2019-08-15 RX ORDER — ACETAMINOPHEN 500 MG
1000 TABLET ORAL EVERY 6 HOURS
Status: DISCONTINUED | OUTPATIENT
Start: 2019-08-15 | End: 2019-08-18 | Stop reason: HOSPADM

## 2019-08-15 RX ORDER — ALLOPURINOL 300 MG/1
300 TABLET ORAL EVERY EVENING
COMMUNITY
End: 2020-02-19 | Stop reason: SDUPTHER

## 2019-08-15 RX ORDER — LORATADINE 10 MG/1
10 TABLET ORAL PRN
COMMUNITY

## 2019-08-15 RX ORDER — HYDROMORPHONE HYDROCHLORIDE 1 MG/ML
0.1 INJECTION, SOLUTION INTRAMUSCULAR; INTRAVENOUS; SUBCUTANEOUS
Status: DISCONTINUED | OUTPATIENT
Start: 2019-08-15 | End: 2019-08-15

## 2019-08-15 RX ORDER — CEFAZOLIN SODIUM 1 G/3ML
INJECTION, POWDER, FOR SOLUTION INTRAMUSCULAR; INTRAVENOUS PRN
Status: DISCONTINUED | OUTPATIENT
Start: 2019-08-15 | End: 2019-08-15

## 2019-08-15 RX ORDER — MEPERIDINE HYDROCHLORIDE 25 MG/ML
12.5 INJECTION INTRAMUSCULAR; INTRAVENOUS; SUBCUTANEOUS
Status: DISCONTINUED | OUTPATIENT
Start: 2019-08-15 | End: 2019-08-15

## 2019-08-15 RX ORDER — HYDROMORPHONE HYDROCHLORIDE 2 MG/ML
INJECTION, SOLUTION INTRAMUSCULAR; INTRAVENOUS; SUBCUTANEOUS PRN
Status: DISCONTINUED | OUTPATIENT
Start: 2019-08-15 | End: 2019-08-15 | Stop reason: SURG

## 2019-08-15 RX ORDER — HEPARIN SODIUM,PORCINE 1000/ML
VIAL (ML) INJECTION
Status: DISCONTINUED | OUTPATIENT
Start: 2019-08-15 | End: 2019-08-15 | Stop reason: HOSPADM

## 2019-08-15 RX ORDER — SODIUM CHLORIDE, SODIUM LACTATE, POTASSIUM CHLORIDE, CALCIUM CHLORIDE 600; 310; 30; 20 MG/100ML; MG/100ML; MG/100ML; MG/100ML
INJECTION, SOLUTION INTRAVENOUS CONTINUOUS
Status: DISCONTINUED | OUTPATIENT
Start: 2019-08-15 | End: 2019-08-15 | Stop reason: HOSPADM

## 2019-08-15 RX ORDER — MAGNESIUM SULFATE HEPTAHYDRATE 500 MG/ML
INJECTION, SOLUTION INTRAMUSCULAR; INTRAVENOUS PRN
Status: DISCONTINUED | OUTPATIENT
Start: 2019-08-15 | End: 2019-08-15 | Stop reason: SURG

## 2019-08-15 RX ORDER — OXYCODONE HCL 5 MG/5 ML
5 SOLUTION, ORAL ORAL
Status: DISCONTINUED | OUTPATIENT
Start: 2019-08-15 | End: 2019-08-15

## 2019-08-15 RX ORDER — DEXAMETHASONE SODIUM PHOSPHATE 4 MG/ML
INJECTION, SOLUTION INTRA-ARTICULAR; INTRALESIONAL; INTRAMUSCULAR; INTRAVENOUS; SOFT TISSUE PRN
Status: DISCONTINUED | OUTPATIENT
Start: 2019-08-15 | End: 2019-08-15 | Stop reason: SURG

## 2019-08-15 RX ORDER — OXYCODONE HCL 5 MG/5 ML
10 SOLUTION, ORAL ORAL
Status: DISCONTINUED | OUTPATIENT
Start: 2019-08-15 | End: 2019-08-15

## 2019-08-15 RX ORDER — CEFAZOLIN SODIUM 1 G/3ML
INJECTION, POWDER, FOR SOLUTION INTRAMUSCULAR; INTRAVENOUS PRN
Status: DISCONTINUED | OUTPATIENT
Start: 2019-08-15 | End: 2019-08-15 | Stop reason: SURG

## 2019-08-15 RX ORDER — DIPHENHYDRAMINE HYDROCHLORIDE 50 MG/ML
25 INJECTION INTRAMUSCULAR; INTRAVENOUS EVERY 6 HOURS PRN
Status: DISCONTINUED | OUTPATIENT
Start: 2019-08-15 | End: 2019-08-16

## 2019-08-15 RX ORDER — HALOPERIDOL 5 MG/ML
1 INJECTION INTRAMUSCULAR EVERY 6 HOURS PRN
Status: DISCONTINUED | OUTPATIENT
Start: 2019-08-15 | End: 2019-08-16

## 2019-08-15 RX ORDER — LABETALOL HYDROCHLORIDE 5 MG/ML
10 INJECTION, SOLUTION INTRAVENOUS
Status: DISCONTINUED | OUTPATIENT
Start: 2019-08-15 | End: 2019-08-16

## 2019-08-15 RX ORDER — KETAMINE HYDROCHLORIDE 50 MG/ML
INJECTION, SOLUTION INTRAMUSCULAR; INTRAVENOUS PRN
Status: DISCONTINUED | OUTPATIENT
Start: 2019-08-15 | End: 2019-08-15 | Stop reason: SURG

## 2019-08-15 RX ORDER — ONDANSETRON 2 MG/ML
4 INJECTION INTRAMUSCULAR; INTRAVENOUS
Status: DISCONTINUED | OUTPATIENT
Start: 2019-08-15 | End: 2019-08-15 | Stop reason: HOSPADM

## 2019-08-15 RX ORDER — KETOROLAC TROMETHAMINE 30 MG/ML
30 INJECTION, SOLUTION INTRAMUSCULAR; INTRAVENOUS EVERY 6 HOURS
Status: DISPENSED | OUTPATIENT
Start: 2019-08-15 | End: 2019-08-18

## 2019-08-15 RX ORDER — DEXAMETHASONE SODIUM PHOSPHATE 4 MG/ML
4 INJECTION, SOLUTION INTRA-ARTICULAR; INTRALESIONAL; INTRAMUSCULAR; INTRAVENOUS; SOFT TISSUE
Status: DISCONTINUED | OUTPATIENT
Start: 2019-08-15 | End: 2019-08-16

## 2019-08-15 RX ORDER — ATORVASTATIN CALCIUM 20 MG/1
20 TABLET, FILM COATED ORAL NIGHTLY
COMMUNITY
End: 2020-02-19 | Stop reason: SDUPTHER

## 2019-08-15 RX ORDER — HEPARIN SODIUM 1000 [USP'U]/ML
INJECTION, SOLUTION INTRAVENOUS; SUBCUTANEOUS PRN
Status: DISCONTINUED | OUTPATIENT
Start: 2019-08-15 | End: 2019-08-15 | Stop reason: SURG

## 2019-08-15 RX ORDER — PROTAMINE SULFATE 10 MG/ML
INJECTION, SOLUTION INTRAVENOUS PRN
Status: DISCONTINUED | OUTPATIENT
Start: 2019-08-15 | End: 2019-08-15 | Stop reason: SURG

## 2019-08-15 RX ORDER — SODIUM CHLORIDE, SODIUM LACTATE, POTASSIUM CHLORIDE, CALCIUM CHLORIDE 600; 310; 30; 20 MG/100ML; MG/100ML; MG/100ML; MG/100ML
INJECTION, SOLUTION INTRAVENOUS CONTINUOUS
Status: ACTIVE | OUTPATIENT
Start: 2019-08-15 | End: 2019-08-15

## 2019-08-15 RX ORDER — MONTELUKAST SODIUM 10 MG/1
10 TABLET ORAL EVERY EVENING
COMMUNITY
End: 2020-01-06

## 2019-08-15 RX ORDER — PHENYLEPHRINE HCL IN 0.9% NACL 0.5 MG/5ML
SYRINGE (ML) INTRAVENOUS PRN
Status: DISCONTINUED | OUTPATIENT
Start: 2019-08-15 | End: 2019-08-15 | Stop reason: HOSPADM

## 2019-08-15 RX ORDER — ONDANSETRON 2 MG/ML
INJECTION INTRAMUSCULAR; INTRAVENOUS PRN
Status: DISCONTINUED | OUTPATIENT
Start: 2019-08-15 | End: 2019-08-15 | Stop reason: SURG

## 2019-08-15 RX ORDER — AMLODIPINE BESYLATE 10 MG/1
5 TABLET ORAL
Status: DISCONTINUED | OUTPATIENT
Start: 2019-08-15 | End: 2019-08-18 | Stop reason: HOSPADM

## 2019-08-15 RX ADMIN — SODIUM CHLORIDE, POTASSIUM CHLORIDE, SODIUM LACTATE AND CALCIUM CHLORIDE: 600; 310; 30; 20 INJECTION, SOLUTION INTRAVENOUS at 06:48

## 2019-08-15 RX ADMIN — KETOROLAC TROMETHAMINE 30 MG: 30 INJECTION, SOLUTION INTRAMUSCULAR at 15:43

## 2019-08-15 RX ADMIN — Medication 100 MCG: at 10:53

## 2019-08-15 RX ADMIN — MORPHINE SULFATE: 50 INJECTION, SOLUTION, CONCENTRATE INTRAVENOUS at 15:33

## 2019-08-15 RX ADMIN — ONDANSETRON 4 MG: 2 INJECTION INTRAMUSCULAR; INTRAVENOUS at 11:43

## 2019-08-15 RX ADMIN — AMLODIPINE BESYLATE 5 MG: 10 TABLET ORAL at 17:25

## 2019-08-15 RX ADMIN — CEFAZOLIN 3 G: 330 INJECTION, POWDER, FOR SOLUTION INTRAMUSCULAR; INTRAVENOUS at 11:36

## 2019-08-15 RX ADMIN — Medication 100 MCG: at 08:09

## 2019-08-15 RX ADMIN — PROTAMINE SULFATE 50 MG: 10 INJECTION, SOLUTION INTRAVENOUS at 11:14

## 2019-08-15 RX ADMIN — MAGNESIUM SULFATE HEPTAHYDRATE 1 G: 500 INJECTION, SOLUTION INTRAMUSCULAR; INTRAVENOUS at 07:35

## 2019-08-15 RX ADMIN — SODIUM CHLORIDE, POTASSIUM CHLORIDE, SODIUM LACTATE AND CALCIUM CHLORIDE: 600; 310; 30; 20 INJECTION, SOLUTION INTRAVENOUS at 09:03

## 2019-08-15 RX ADMIN — PROPOFOL 200 MG: 10 INJECTION, EMULSION INTRAVENOUS at 07:35

## 2019-08-15 RX ADMIN — ACETAMINOPHEN 1000 MG: 500 TABLET ORAL at 15:43

## 2019-08-15 RX ADMIN — KETOROLAC TROMETHAMINE 30 MG: 30 INJECTION, SOLUTION INTRAMUSCULAR at 23:20

## 2019-08-15 RX ADMIN — ROCURONIUM BROMIDE 50 MG: 10 INJECTION, SOLUTION INTRAVENOUS at 08:03

## 2019-08-15 RX ADMIN — ROCURONIUM BROMIDE 50 MG: 10 INJECTION, SOLUTION INTRAVENOUS at 07:35

## 2019-08-15 RX ADMIN — SODIUM CHLORIDE, POTASSIUM CHLORIDE, SODIUM LACTATE AND CALCIUM CHLORIDE: 600; 310; 30; 20 INJECTION, SOLUTION INTRAVENOUS at 13:37

## 2019-08-15 RX ADMIN — ROCURONIUM BROMIDE 30 MG: 10 INJECTION, SOLUTION INTRAVENOUS at 11:02

## 2019-08-15 RX ADMIN — SODIUM CHLORIDE, POTASSIUM CHLORIDE, SODIUM LACTATE AND CALCIUM CHLORIDE: 600; 310; 30; 20 INJECTION, SOLUTION INTRAVENOUS at 10:53

## 2019-08-15 RX ADMIN — NOREPINEPHRINE BITARTRATE 2 MCG/MIN: 1 INJECTION INTRAVENOUS at 10:58

## 2019-08-15 RX ADMIN — SUGAMMADEX 200 MG: 100 INJECTION, SOLUTION INTRAVENOUS at 11:41

## 2019-08-15 RX ADMIN — ROCURONIUM BROMIDE 20 MG: 10 INJECTION, SOLUTION INTRAVENOUS at 09:42

## 2019-08-15 RX ADMIN — HYDROMORPHONE HYDROCHLORIDE 1 MG: 2 INJECTION, SOLUTION INTRAMUSCULAR; INTRAVENOUS; SUBCUTANEOUS at 11:54

## 2019-08-15 RX ADMIN — HEPARIN SODIUM 8000 UNITS: 1000 INJECTION, SOLUTION INTRAVENOUS; SUBCUTANEOUS at 09:49

## 2019-08-15 RX ADMIN — HYDROMORPHONE HYDROCHLORIDE 1 MG: 2 INJECTION, SOLUTION INTRAMUSCULAR; INTRAVENOUS; SUBCUTANEOUS at 11:47

## 2019-08-15 RX ADMIN — CEFAZOLIN 3 G: 330 INJECTION, POWDER, FOR SOLUTION INTRAMUSCULAR; INTRAVENOUS at 07:36

## 2019-08-15 RX ADMIN — ROCURONIUM BROMIDE 30 MG: 10 INJECTION, SOLUTION INTRAVENOUS at 09:59

## 2019-08-15 RX ADMIN — KETAMINE HYDROCHLORIDE 75 MG: 50 INJECTION, SOLUTION INTRAMUSCULAR; INTRAVENOUS at 07:54

## 2019-08-15 RX ADMIN — ACETAMINOPHEN 1000 MG: 500 TABLET ORAL at 23:20

## 2019-08-15 RX ADMIN — DEXAMETHASONE SODIUM PHOSPHATE 8 MG: 4 INJECTION, SOLUTION INTRA-ARTICULAR; INTRALESIONAL; INTRAMUSCULAR; INTRAVENOUS; SOFT TISSUE at 07:35

## 2019-08-15 RX ADMIN — ROCURONIUM BROMIDE 50 MG: 10 INJECTION, SOLUTION INTRAVENOUS at 08:55

## 2019-08-15 ASSESSMENT — ENCOUNTER SYMPTOMS: ABDOMINAL PAIN: 1

## 2019-08-15 ASSESSMENT — PAIN SCALES - GENERAL: PAIN_LEVEL: 1

## 2019-08-15 NOTE — ANESTHESIA PREPROCEDURE EVALUATION
Relevant Problems   ANESTHESIA   (+) DK (obstructive sleep apnea)      CARDIAC   (+) Celiac artery aneurysm (HCC)   (+) Essential hypertension         (+) Nephrolithiasis       Physical Exam    Airway   Mallampati: II  TM distance: >3 FB  Neck ROM: full       Cardiovascular - normal exam  Rhythm: regular  Rate: normal  (-) murmur     Dental - normal exam         Pulmonary - normal exam  Breath sounds clear to auscultation     Abdominal    Neurological - normal exam                 Anesthesia Plan    ASA 3   ASA physical status 3 criteria: other (comment)    Plan - general       Airway plan will be ETT        Induction: intravenous    Postoperative Plan: Postoperative administration of opioids is intended.    Pertinent diagnostic labs and testing reviewed    Informed Consent:    Anesthetic plan and risks discussed with patient.    Use of blood products discussed with: patient whom consented to blood products.

## 2019-08-15 NOTE — H&P
"Surgery General History & Physical Note    Date  8/15/2019    Primary Care Physician  Leigh Bishop M.D.    CC  Celiac artery aneurysm     HPI  This is a 59 y.o. male who presented with a large celiac artery aneurysm.  Here for elective repair.     Past Medical History:   Diagnosis Date   • Anesthesia 08/13/2019    \"I was told I turned green on table during foot surgery about 20 years ago.\"   • Arthritis 08/13/2019    Left Knee, Right Hand    • Breath shortness 08/13/2019    Pt. states overweight & out of shape.   • Celiac artery aneurysm (HCC)    • Chickenpox    • Dyslipidemia    • High cholesterol    • Hypertension    • Kidney stone    • Sleep apnea     CPAP USE   • Snoring     DX DK       Past Surgical History:   Procedure Laterality Date   • BONE SPUR EXCISION     • KNEE ARTHROSCOPY      repair micscus tear   • LITHOTRIPSY     • OTHER      Surgery for right shoulder dislocation and torn ligaments 1974.   • OTHER ORTHOPEDIC SURGERY      Left Foot Surgery x3 from broken foot in high school.   • OTHER ORTHOPEDIC SURGERY      Right Achilles Surgery       Current Facility-Administered Medications   Medication Dose Route Frequency Provider Last Rate Last Dose   • lactated ringers infusion   Intravenous Continuous Elie Morales M.D. 10 mL/hr at 08/15/19 0648     • heparin OR USE ONLY    Intra-Op Once PRN Elie Morales M.D.   5,000 Units at 08/15/19 0700       Social History     Socioeconomic History   • Marital status:      Spouse name: Not on file   • Number of children: Not on file   • Years of education: Not on file   • Highest education level: Not on file   Occupational History   • Not on file   Social Needs   • Financial resource strain: Not on file   • Food insecurity:     Worry: Not on file     Inability: Not on file   • Transportation needs:     Medical: Not on file     Non-medical: Not on file   Tobacco Use   • Smoking status: Never Smoker   • Smokeless tobacco: Never Used   Substance and Sexual " Activity   • Alcohol use: Yes     Alcohol/week: 0.0 oz     Comment: 2/month   • Drug use: No   • Sexual activity: Yes     Partners: Female   Lifestyle   • Physical activity:     Days per week: Not on file     Minutes per session: Not on file   • Stress: Not on file   Relationships   • Social connections:     Talks on phone: Not on file     Gets together: Not on file     Attends Druze service: Not on file     Active member of club or organization: Not on file     Attends meetings of clubs or organizations: Not on file     Relationship status: Not on file   • Intimate partner violence:     Fear of current or ex partner: Not on file     Emotionally abused: Not on file     Physically abused: Not on file     Forced sexual activity: Not on file   Other Topics Concern   • Not on file   Social History Narrative   • Not on file       Family History   Problem Relation Age of Onset   • Cancer Brother         parathyroid   • Sleep Apnea Brother    • Diabetes Brother    • Sleep Apnea Brother    • Diabetes Father        Allergies  Lisinopril    Review of Systems  Negative    Physical Exam     Heart RRR  Lungs - clear   Abdomen -soft   Neuro-intact         Vital Signs  Blood Pressure: 123/76   Temperature: 36.8 °C (98.2 °F)   Pulse: 73   Respiration: 19   Pulse Oximetry: 94 %       Labs:                    Radiology:  No orders to display         Assessment/Plan:  Celiac artery aneurysm     Plan - Repair celiac artery aneurysm

## 2019-08-15 NOTE — ANESTHESIA QCDR
2019 [facility] Clinical Data Registry (for Quality Improvement)     Postoperative nausea/vomiting risk protocol (Adult = 18 yrs and Pediatric 3-17 yrs)- (430 and 463)  General inhalation anesthetic (NOT TIVA) with PONV risk factors: Yes  Provision of anti-emetic therapy with at least 2 different classes of agents: Yes   Patient DID NOT receive anti-emetic therapy and reason is documented in Medical Record:  N/A    Multimodal Pain Management- (AQI59)  Patient undergoing Elective Surgery (i.e. Outpatient, or ASC, or Prescheduled Surgery prior to Hospital Admission): Yes  Use of Multimodal Pain Management, two or more drugs and/or interventions, NOT including systemic opioids: Yes   Exception: Documented allergy to multiple classes of analgesics:  N/A    PACU assessment of acute postoperative pain prior to Anesthesia Care End- Applies to Patients Age = 18- (ABG7)  Initial PACU pain score is which of the following: < 7/10  Patient unable to report pain score: N/A    Post-anesthetic transfer of care checklist/protocol to PACU/ICU- (426 and 427)  Upon conclusion of case, patient transferred to which of the following locations: PACU/Non-ICU  Use of transfer checklist/protocol: Yes  Exclusion: Service Performed in Patient Hospital Room (and thus did not require transfer): N/A    PACU Reintubation- (AQI31)  General anesthesia requiring endotracheal intubation (ETT) along with subsequent extubation in OR or PACU: Yes  Required reintubation in the PACU: No   Extubation was a planned trial documented in the medical record prior to removal of the original airway device:  N/A    Unplanned admission to ICU related to anesthesia service up through end of PACU care- (MD51)  Unplanned admission to ICU (not initially anticipated at anesthesia start time): No

## 2019-08-15 NOTE — ANESTHESIA TIME REPORT
Anesthesia Start and Stop Event Times     Date Time Event    8/15/2019 0724 Ready for Procedure     0733 Anesthesia Start     1202 Anesthesia Stop        Responsible Staff  08/15/19    Name Role Begin End    Tobey Gansert, M.D. Anesth 0733 1202        Preop Diagnosis (Free Text):  Pre-op Diagnosis     CELIAC ARTERY ANEURYSM        Preop Diagnosis (Codes):    Post op Diagnosis  Celiac artery aneurysm (HCC)      Premium Reason  Non-Premium    Comments:

## 2019-08-15 NOTE — ANESTHESIA PROCEDURE NOTES
Central Venous Line  Performed by: Tobey Gansert, M.D.  Authorized by: Tobey Gansert, M.D.     Start Time:  8/15/2019 7:42 AM  End Time:  8/15/2019 7:51 AM  Patient Location:  OR  Indication: central venous access    provider hand hygiene performed prior to central venous catheter insertion, all 5 sterile barriers used (gloves, gown, cap, mask, large sterile drape) during central venous catheter insertion and skin prep agent completely dried prior to procedure    Patient Position:  Trendelenburg  Laterality:  Right  Site:  Internal jugular  Prep:  Chlorhexidine  Catheter Size:  7 Fr  Number of Lumens:  Triple lumen  target vein identified, needle advanced into vein and blood aspirated and guidewire advanced into vein    Seldinger Technique?: Yes    Ultrasound-Guided: ultrasound-guided  Image captured, interpreted and electronically stored.  Sterile Gel and Probe Cover Used for Ultrasound?: Yes    Intravenous Verification: verified by ultrasound, venous blood return and chest x-ray pending    all ports aspirated, all ports flushed easily, guidewire was removed intact, biopatch was applied, line was sutured in place and dressing was applied    Events: patient tolerated procedure well with no complications

## 2019-08-15 NOTE — OP REPORT
08/15/19    OPERATIVE NOTE    PRE-OPERATIVE DIAGNOSIS -     1. Celiac Artery Aneurysm     POST-OPERATIVE DIAGNOSIS - Same     PROCEDURE PERFORMED -     1. Open Repair Celiac Artery Aneurysm   2. Supraceliac Aorta to Celiac Artery Bifurcation Interposiiotn 8mm Graft       SURGEON - Elie Morales M.D.    ASSISTANT - Dr. Gan     SECOND ASSISTANT - Satnam Apple. APN     TYPE OF ANESTHESIA - General     ANESTHESIOLOGIST  - Dr. Gansert      SPECIMENS - None       PROCEDURE IN DETAIL -     Patient was taken to the operating suite placed in the supine position.  After adequate anesthesia the patient's abdomen was shaved prepped draped in sterile fashion.  An incision was made from xiphoid to just below the umbilicus.  Incision was carried down through the subcutaneous tissue and the anal cavity was opened in standard fashion.  Fixed retraction was established using an Omni retractor.  The liver was retracted cephalad and the lesser omentum was incised as well as the hepatogastric ligament to gain access to the jovany the diaphragm.  The esophagus was retracted laterally and the jovany of the diaphragm was incised as it draped over the supraceliac aorta.  The supraceliac aorta was identified and the dissection took place distally down toward the root of the celiac artery.  The median arcuate ligament was taken down using electrocautery.  Next the common hepatic artery was identified as well as the splenic artery and these vessels were circumferentially dissected and isolated.  Next the celiac artery aneurysm was dissected from the surrounding tissue.  The base of the celiac artery as it branch from the aorta was encircled using a vessel loop.  Once the origin of the celiac was sufficiently dissected circumferentially as well as the celiac aneurysm and the branches 8000 units of heparin was administered.  The supraceliac aorta was further mobilized as it was going to be impossible to reimplant the celiac bifurcation to the  aorta based on the distance between the supraceliac aorta and the celiac bifurcation.  I therefore elected to use dacryon interposition graft.  A Satinsky partial occlusion clamp was placed in the supraceliac aorta.  A longitudinal aortotomy was made a portion of the aorta was removed in the region of the planned anastomosis.  Next an 8 mm dacryon graft was probably spatulated and an end-to-side anastomosis was completed between the dacryon graft in the supraceliac aorta using a 4-0 Prolene suture in a running fashion.  Once that anastomosis was complete the Satinsky clamp was removed and the graft clamp was placed on the graft.  Next a #1 silk tie was placed around the origin of the celiac artery and that artery was ligated.  The aneurysm was then resected by detaching the celiac bifurcation that being the right common hepatic artery and the splenic artery away from the aneurysm.  The aneurysm was then oversewn using 3-0 Prolene vertical mattress sutures.  Next the 8 mm graft was partially spatulated and it into his and anastomosis was completed between the 8 mm dacryon graft and the celiac artery bifurcation feeding the right hepatic and splenic.  4-0 Prolene suture was used for this anastomosis in running fashion.  After for flushing of backflushing flow was established.  Hemostasis was assured and 50 mg of protamine was administered.  All retractors and loops were removed.  The peritoneal cavity was then closed using #1 Vicryl running sutures for the fascial layer x2.  Skin staples were used to reapproximate the skin incision.       Elie Morales M.D.

## 2019-08-15 NOTE — ANESTHESIA PROCEDURE NOTES
Arterial Line  Performed by: Tobey Gansert, M.D.  Authorized by: Tobey Gansert, M.D.     Start Time:  8/15/2019 7:38 AM  End Time:  8/15/2019 7:41 AM  Localization: ultrasound guidance  Image captured, interpreted and electronically stored.    Patient Location:  OR  Indication: continuous blood pressure monitoring    Catheter Size:  20 G  Seldinger Technique?: Yes    Laterality:  Right  Site:  Radial artery  Line Secured:  Antimicrobial disc, tape and transparent dressing  Events: patient tolerated procedure well with no complications

## 2019-08-15 NOTE — ANESTHESIA PROCEDURE NOTES
Airway  Date/Time: 8/15/2019 7:36 AM  Performed by: Tobey Gansert, M.D.  Authorized by: Tobey Gansert, M.D.     Location:  OR  Urgency:  Elective  Indications for Airway Management:  Anesthesia  Spontaneous Ventilation: absent    Sedation Level:  Deep  Preoxygenated: Yes    Patient Position:  Sniffing  Mask Difficulty Assessment:  1 - vent by mask  Final Airway Type:  Endotracheal airway  Final Endotracheal Airway:  ETT  Cuffed: Yes    Technique Used for Successful ETT Placement:  Video laryngoscopy  Insertion Site:  Oral  Blade Type:  Domenico  Laryngoscope Blade/Videolaryngoscope Blade Size:  3  ETT Size (mm):  8.0  Measured from:  Teeth  ETT to Teeth (cm):  24  Placement Verified by: auscultation and capnometry    Cormack-Lehane Classification:  Grade IIa - partial view of glottis  Number of Attempts at Approach:  1  Ventilation Between Attempts:  BVM   Mac 3 grade 4 view. Easy mask with OPA. Successful attempt with glidescope

## 2019-08-15 NOTE — PROGRESS NOTES
Trauma / Surgical Daily Progress Note    Date of Service  8/15/2019    Chief Complaint  59 y.o. male admitted 8/15/2019 with CELIAC ARTERY ANEURYSM    Interval Events  Hospital day #1  Pt currently requires ICU care and hospital admission  Seen on rounds and discussed with multidisciplinary team  Physiologic derangements preclude floor transfer  Events and interventions include:  Integration of multiple data points and associated complex medical decisions   Mgt of blood pressure-maintain sbp<140  Pain control  Pulmonary toilet-at high risk for decompensation    Review of Systems  Review of Systems   Gastrointestinal: Positive for abdominal pain.   All other systems reviewed and are negative.       Vital Signs for last 24 hours  Temp:  [36.3 °C (97.3 °F)-36.8 °C (98.2 °F)] 36.3 °C (97.3 °F)  Pulse:  [73-90] 90  Resp:  [18-29] 23  BP: ()/(51-76) 97/51  SpO2:  [92 %-95 %] 93 %    Hemodynamic parameters for last 24 hours       Respiratory Data     Respiration: (!) 23, Pulse Oximetry: 93 %             Physical Exam  Physical Exam   Constitutional: He is oriented to person, place, and time. He appears well-developed and well-nourished. No distress.   HENT:   Head: Normocephalic and atraumatic.   Eyes: Pupils are equal, round, and reactive to light. EOM are normal. No scleral icterus.   Neck: Normal range of motion. Neck supple.   Cardiovascular: Normal rate, regular rhythm and normal heart sounds.   Pulmonary/Chest: Effort normal and breath sounds normal. No respiratory distress.   Abdominal: Soft. Bowel sounds are normal. There is no tenderness.   dressings in place   Genitourinary:   Genitourinary Comments: Nicolas in place   Neurological: He is alert and oriented to person, place, and time. No cranial nerve deficit.   Skin: Skin is warm and dry. No erythema.       Laboratory  Recent Results (from the past 24 hour(s))   COD (Adult)    Collection Time: 08/15/19  6:35 AM   Result Value Ref Range    ABO Grouping Only O      Rh Grouping Only POS     Antibody Screen-Cod NEG    ABO Rh Confirm    Collection Time: 08/15/19  6:50 AM   Result Value Ref Range    ABO Rh Confirm O POS    CBC without Differential    Collection Time: 08/15/19 12:15 PM   Result Value Ref Range    WBC 19.8 (H) 4.8 - 10.8 K/uL    RBC 4.39 (L) 4.70 - 6.10 M/uL    Hemoglobin 12.8 (L) 14.0 - 18.0 g/dL    Hematocrit 38.9 (L) 42.0 - 52.0 %    MCV 88.6 81.4 - 97.8 fL    MCH 29.2 27.0 - 33.0 pg    MCHC 32.9 (L) 33.7 - 35.3 g/dL    RDW 50.4 (H) 35.9 - 50.0 fL    Platelet Count 261 164 - 446 K/uL    MPV 11.2 9.0 - 12.9 fL   Comp Metabolic Panel    Collection Time: 08/15/19  1:45 PM   Result Value Ref Range    Sodium 144 135 - 145 mmol/L    Potassium 3.8 3.6 - 5.5 mmol/L    Chloride 111 96 - 112 mmol/L    Co2 25 20 - 33 mmol/L    Anion Gap 8.0 0.0 - 11.9    Glucose 198 (H) 65 - 99 mg/dL    Bun 17 8 - 22 mg/dL    Creatinine 0.87 0.50 - 1.40 mg/dL    Calcium 8.6 8.5 - 10.5 mg/dL    AST(SGOT) 104 (H) 12 - 45 U/L    ALT(SGPT) 124 (H) 2 - 50 U/L    Alkaline Phosphatase 95 30 - 99 U/L    Total Bilirubin 0.6 0.1 - 1.5 mg/dL    Albumin 3.2 3.2 - 4.9 g/dL    Total Protein 6.1 6.0 - 8.2 g/dL    Globulin 2.9 1.9 - 3.5 g/dL    A-G Ratio 1.1 g/dL   ESTIMATED GFR    Collection Time: 08/15/19  1:45 PM   Result Value Ref Range    GFR If African American >60 >60 mL/min/1.73 m 2    GFR If Non African American >60 >60 mL/min/1.73 m 2       Fluids    Intake/Output Summary (Last 24 hours) at 8/15/2019 1555  Last data filed at 8/15/2019 1245  Gross per 24 hour   Intake 5000 ml   Output 1350 ml   Net 3650 ml       Core Measures & Quality Metrics  Core Measures & Quality Metrics  YANA Score  ETOH Screening    Assessment/Plan  Celiac artery aneurysm (HCC)- (present on admission)  Assessment & Plan  Operative repair with inter position graft  Dr. Morales  Maintain sbp<140    Essential hypertension- (present on admission)  Assessment & Plan  Resume home meds         Discussed patient condition with  Family, RN, RT, Pharmacy, Dietary,  and Patient.

## 2019-08-15 NOTE — ANESTHESIA POSTPROCEDURE EVALUATION
Patient: Jhonatan Fonseca    Procedure Summary     Date:  08/15/19 Room / Location:  [facility] OR 09 / SURGERY [facility]    Anesthesia Start:  0733 Anesthesia Stop:  1202    Procedure:  CREATION, bypass arterial, celiac artery aneurism repair (N/A Abdomen) Diagnosis:  (CELIAC ARTERY ANEURYSM)    Surgeon:  Elie Morales M.D. Responsible Provider:  Tobey Gansert, M.D.    Anesthesia Type:  general ASA Status:  3          Final Anesthesia Type: general  Last vitals  BP   Blood Pressure: 123/76, NIBP: (!) 87/54    Temp   36.3 °C (97.4 °F)    Pulse   Pulse: 73, Heart Rate (Monitored): 78   Resp   18    SpO2   92 %      Anesthesia Post Evaluation    Patient location during evaluation: PACU  Patient participation: complete - patient participated  Level of consciousness: awake and alert  Pain score: 1    Airway patency: patent  Anesthetic complications: no  Cardiovascular status: hemodynamically stable  Respiratory status: acceptable  Hydration status: euvolemic    PONV: none           Nurse Pain Score: 0 (NPRS)

## 2019-08-16 LAB
ANION GAP SERPL CALC-SCNC: 4 MMOL/L (ref 0–11.9)
APTT PPP: 28 SEC (ref 24.7–36)
BUN SERPL-MCNC: 15 MG/DL (ref 8–22)
CALCIUM SERPL-MCNC: 8.6 MG/DL (ref 8.5–10.5)
CHLORIDE SERPL-SCNC: 110 MMOL/L (ref 96–112)
CO2 SERPL-SCNC: 27 MMOL/L (ref 20–33)
CREAT SERPL-MCNC: 0.68 MG/DL (ref 0.5–1.4)
ERYTHROCYTE [DISTWIDTH] IN BLOOD BY AUTOMATED COUNT: 50.8 FL (ref 35.9–50)
GLUCOSE SERPL-MCNC: 127 MG/DL (ref 65–99)
HCT VFR BLD AUTO: 39 % (ref 42–52)
HGB BLD-MCNC: 12.2 G/DL (ref 14–18)
INR PPP: 1.22 (ref 0.87–1.13)
MCH RBC QN AUTO: 28.3 PG (ref 27–33)
MCHC RBC AUTO-ENTMCNC: 31.3 G/DL (ref 33.7–35.3)
MCV RBC AUTO: 90.5 FL (ref 81.4–97.8)
PLATELET # BLD AUTO: 206 K/UL (ref 164–446)
PMV BLD AUTO: 10 FL (ref 9–12.9)
POTASSIUM SERPL-SCNC: 4.3 MMOL/L (ref 3.6–5.5)
PROTHROMBIN TIME: 15.7 SEC (ref 12–14.6)
RBC # BLD AUTO: 4.31 M/UL (ref 4.7–6.1)
SODIUM SERPL-SCNC: 141 MMOL/L (ref 135–145)
WBC # BLD AUTO: 14.3 K/UL (ref 4.8–10.8)

## 2019-08-16 PROCEDURE — 80048 BASIC METABOLIC PNL TOTAL CA: CPT

## 2019-08-16 PROCEDURE — A9270 NON-COVERED ITEM OR SERVICE: HCPCS | Performed by: NURSE PRACTITIONER

## 2019-08-16 PROCEDURE — 770006 HCHG ROOM/CARE - MED/SURG/GYN SEMI*

## 2019-08-16 PROCEDURE — 85610 PROTHROMBIN TIME: CPT

## 2019-08-16 PROCEDURE — 700111 HCHG RX REV CODE 636 W/ 250 OVERRIDE (IP): Performed by: SURGERY

## 2019-08-16 PROCEDURE — 85730 THROMBOPLASTIN TIME PARTIAL: CPT

## 2019-08-16 PROCEDURE — A9270 NON-COVERED ITEM OR SERVICE: HCPCS | Performed by: SURGERY

## 2019-08-16 PROCEDURE — 99232 SBSQ HOSP IP/OBS MODERATE 35: CPT | Performed by: SURGERY

## 2019-08-16 PROCEDURE — 700102 HCHG RX REV CODE 250 W/ 637 OVERRIDE(OP): Performed by: NURSE PRACTITIONER

## 2019-08-16 PROCEDURE — 700102 HCHG RX REV CODE 250 W/ 637 OVERRIDE(OP): Performed by: SURGERY

## 2019-08-16 PROCEDURE — 85027 COMPLETE CBC AUTOMATED: CPT

## 2019-08-16 RX ORDER — LABETALOL HYDROCHLORIDE 5 MG/ML
10 INJECTION, SOLUTION INTRAVENOUS EVERY 4 HOURS PRN
Status: DISCONTINUED | OUTPATIENT
Start: 2019-08-16 | End: 2019-08-18 | Stop reason: HOSPADM

## 2019-08-16 RX ORDER — OXYCODONE HYDROCHLORIDE 10 MG/1
10 TABLET ORAL
Status: DISCONTINUED | OUTPATIENT
Start: 2019-08-16 | End: 2019-08-18 | Stop reason: HOSPADM

## 2019-08-16 RX ORDER — OXYCODONE HYDROCHLORIDE 5 MG/1
5 TABLET ORAL
Status: DISCONTINUED | OUTPATIENT
Start: 2019-08-16 | End: 2019-08-18 | Stop reason: HOSPADM

## 2019-08-16 RX ADMIN — ACETAMINOPHEN 1000 MG: 500 TABLET ORAL at 23:24

## 2019-08-16 RX ADMIN — KETOROLAC TROMETHAMINE 30 MG: 30 INJECTION, SOLUTION INTRAMUSCULAR at 16:28

## 2019-08-16 RX ADMIN — ACETAMINOPHEN 1000 MG: 500 TABLET ORAL at 16:28

## 2019-08-16 RX ADMIN — KETOROLAC TROMETHAMINE 30 MG: 30 INJECTION, SOLUTION INTRAMUSCULAR at 05:06

## 2019-08-16 RX ADMIN — OXYCODONE HYDROCHLORIDE 10 MG: 10 TABLET ORAL at 12:11

## 2019-08-16 RX ADMIN — ONDANSETRON 4 MG: 2 INJECTION INTRAMUSCULAR; INTRAVENOUS at 17:17

## 2019-08-16 RX ADMIN — ONDANSETRON 4 MG: 2 INJECTION INTRAMUSCULAR; INTRAVENOUS at 12:02

## 2019-08-16 RX ADMIN — ACETAMINOPHEN 1000 MG: 500 TABLET ORAL at 12:11

## 2019-08-16 RX ADMIN — OXYCODONE HYDROCHLORIDE 10 MG: 10 TABLET ORAL at 15:23

## 2019-08-16 RX ADMIN — KETOROLAC TROMETHAMINE 30 MG: 30 INJECTION, SOLUTION INTRAMUSCULAR at 23:24

## 2019-08-16 RX ADMIN — ACETAMINOPHEN 1000 MG: 500 TABLET ORAL at 05:06

## 2019-08-16 RX ADMIN — KETOROLAC TROMETHAMINE 30 MG: 30 INJECTION, SOLUTION INTRAMUSCULAR at 12:08

## 2019-08-16 ASSESSMENT — LIFESTYLE VARIABLES
TOTAL SCORE: 0
EVER HAD A DRINK FIRST THING IN THE MORNING TO STEADY YOUR NERVES TO GET RID OF A HANGOVER: NO
EVER FELT BAD OR GUILTY ABOUT YOUR DRINKING: NO
HAVE PEOPLE ANNOYED YOU BY CRITICIZING YOUR DRINKING: NO
ALCOHOL_USE: NO
TOTAL SCORE: 0
ALCOHOL_USE: YES
DOES PATIENT WANT TO STOP DRINKING: NO
HAVE PEOPLE ANNOYED YOU BY CRITICIZING YOUR DRINKING: NO
HAVE YOU EVER FELT YOU SHOULD CUT DOWN ON YOUR DRINKING: NO
EVER HAD A DRINK FIRST THING IN THE MORNING TO STEADY YOUR NERVES TO GET RID OF A HANGOVER: NO
EVER FELT BAD OR GUILTY ABOUT YOUR DRINKING: NO
TOTAL SCORE: 0
ON A TYPICAL DAY WHEN YOU DRINK ALCOHOL HOW MANY DRINKS DO YOU HAVE: 2
CONSUMPTION TOTAL: NEGATIVE
CONSUMPTION TOTAL: INCOMPLETE
AVERAGE NUMBER OF DAYS PER WEEK YOU HAVE A DRINK CONTAINING ALCOHOL: 0
EVER_SMOKED: NEVER
TOTAL SCORE: 0
TOTAL SCORE: 0
HAVE YOU EVER FELT YOU SHOULD CUT DOWN ON YOUR DRINKING: NO
TOTAL SCORE: 0
HOW MANY TIMES IN THE PAST YEAR HAVE YOU HAD 5 OR MORE DRINKS IN A DAY: 0

## 2019-08-16 ASSESSMENT — COGNITIVE AND FUNCTIONAL STATUS - GENERAL
DAILY ACTIVITIY SCORE: 18
PERSONAL GROOMING: A LITTLE
SUGGESTED CMS G CODE MODIFIER MOBILITY: CK
WALKING IN HOSPITAL ROOM: A LITTLE
MOVING FROM LYING ON BACK TO SITTING ON SIDE OF FLAT BED: A LITTLE
STANDING UP FROM CHAIR USING ARMS: A LITTLE
TOILETING: A LITTLE
MOBILITY SCORE: 18
MOVING TO AND FROM BED TO CHAIR: A LITTLE
DRESSING REGULAR UPPER BODY CLOTHING: A LITTLE
EATING MEALS: A LITTLE
HELP NEEDED FOR BATHING: A LITTLE
SUGGESTED CMS G CODE MODIFIER DAILY ACTIVITY: CK
CLIMB 3 TO 5 STEPS WITH RAILING: A LITTLE
DRESSING REGULAR LOWER BODY CLOTHING: A LITTLE
TURNING FROM BACK TO SIDE WHILE IN FLAT BAD: A LITTLE

## 2019-08-16 ASSESSMENT — ENCOUNTER SYMPTOMS
ABDOMINAL PAIN: 1
ROS GI COMMENTS: HUNGRY
NAUSEA: 0
VOMITING: 0
SHORTNESS OF BREATH: 0

## 2019-08-16 ASSESSMENT — PATIENT HEALTH QUESTIONNAIRE - PHQ9
2. FEELING DOWN, DEPRESSED, IRRITABLE, OR HOPELESS: NOT AT ALL
SUM OF ALL RESPONSES TO PHQ9 QUESTIONS 1 AND 2: 0
1. LITTLE INTEREST OR PLEASURE IN DOING THINGS: NOT AT ALL

## 2019-08-16 NOTE — CARE PLAN
Problem: Pain Management  Goal: Pain level will decrease to patient's comfort goal  Note:   Pt assessed for pain and medications administered as needed per the MAR.  Intervention: Follow pain managment plan developed in collaboration with patient and Interdisciplinary Team  Note:   Pt assessed for pain and medications administered as needed per the MAR.     Problem: Skin Integrity  Goal: Risk for impaired skin integrity will decrease  Intervention: Assess risk factors for impaired skin integrity and/or pressure ulcers  8/16/2019 0836 by Rosemary Galindo R.N.  Note:   Pt assessed for risk factors for impaired skin integrity and/or pressure ulcers.  8/15/2019 1846 by Rosemary Galindo R.N.  Note:   Pt assessed for risk factors for impaired skin integrity.

## 2019-08-16 NOTE — PROGRESS NOTES
"Vascular Surgery       S: Awake Alert  VSS  On no gtts for BP   Good pain control on PCA    O: Soft, nondistended, Incision OK   /66   Pulse 88   Temp 36.3 °C (97.3 °F) (Temporal)   Resp (!) 23   Ht 1.778 m (5' 10\")   Wt 123.3 kg (271 lb 13.2 oz)   SpO2 97%     Intake/Output Summary (Last 24 hours) at 8/16/2019 0932  Last data filed at 8/16/2019 0800  Gross per 24 hour   Intake 4484.5 ml   Output 2520 ml   Net 1964.5 ml     Recent Labs     08/15/19  1215 08/16/19  0440   WBC 19.8* 14.3*   RBC 4.39* 4.31*   HEMOGLOBIN 12.8* 12.2*   HEMATOCRIT 38.9* 39.0*   MCV 88.6 90.5   MCH 29.2 28.3   MCHC 32.9* 31.3*   RDW 50.4* 50.8*   PLATELETCT 261 206   MPV 11.2 10.0     Recent Labs     08/15/19  1345 08/16/19  0440   SODIUM 144 141   POTASSIUM 3.8 4.3   CHLORIDE 111 110   CO2 25 27   GLUCOSE 198* 127*   BUN 17 15   CREATININE 0.87 0.68   CALCIUM 8.6 8.6     Recent Labs     08/15/19  1345 08/16/19  0440   SODIUM 144 141   POTASSIUM 3.8 4.3   CHLORIDE 111 110   CO2 25 27   GLUCOSE 198* 127*   BUN 17 15     Recent Labs     08/16/19  0440   APTT 28.0   INR 1.22*           Current Facility-Administered Medications   Medication Dose   • Pharmacy Consult Request ...Pain Management Review 1 Each  1 Each   • ondansetron (ZOFRAN) syringe/vial injection 4 mg  4 mg   • dexamethasone (DECADRON) injection 4 mg  4 mg   • diphenhydrAMINE (BENADRYL) injection 25 mg  25 mg   • haloperidol lactate (HALDOL) injection 1 mg  1 mg   • scopolamine (TRANSDERM-SCOP) patch 1 Patch  1 Patch   • ketorolac (TORADOL) injection 30 mg  30 mg   • morphine 1 mg/mL in 50 mL (PCA)     • labetalol (NORMODYNE,TRANDATE) injection 10 mg  10 mg    Or   • hydrALAZINE (APRESOLINE) injection 10 mg  10 mg   • clevidipine (CLEVIPREX) IV emulsion  2 mg/hr   • acetaminophen (TYLENOL) tablet 1,000 mg  1,000 mg   • amLODIPine (NORVASC) tablet 5 mg  5 mg       A:  Active Hospital Problems    Diagnosis   • Celiac artery aneurysm (HCC) [I72.8]     Priority: High   • " Essential hypertension [I10]     Priority: High   • Pure hypercholesterolemia [E78.00]       P: OK to transfer to floor   Clear liquids   OOB to chair   D/C Nicolas  Routine post-op care

## 2019-08-16 NOTE — PROGRESS NOTES
2 RN skin check complete with ANDREIA Frias.  Devices in place SCDs.   Skin assessed under the following devices SCDs.   Preventative measures in place including mepilex.  Following areas of concern:   · Midline incision   The following interventions in place Mepilex.    Wound consult placedYES/NO: N/A    Wound reported YES/NO: N/A  Appropriate LDAs opened YES/NO: N\A

## 2019-08-16 NOTE — CARE PLAN
Problem: Pain Management  Goal: Pain level will decrease to patient's comfort goal  Note:   Pt assessed for pain and medications administered as needed per the MAR.     Problem: Skin Integrity  Goal: Risk for impaired skin integrity will decrease  Intervention: Assess risk factors for impaired skin integrity and/or pressure ulcers  Note:   Pt assessed for risk factors for impaired skin integrity.

## 2019-08-16 NOTE — PROGRESS NOTES
2 RN skin check complete with ANDREIA Lombardo.  Devices in place SCDs.   Skin assessed under the following devices SCDs.   Preventative measures in place including Mepilex.  Following areas of concern:   · Midline incision, c/d/i.   The following interventions in place Mepilex    Wound consult placedYES/NO: N\A    Wound reported YES/NO: N\A  Appropriate LDAs opened YES/NO: N\A

## 2019-08-16 NOTE — PROGRESS NOTES
2 Rn skin check complete.     Midline incision covered with island dressing, clean dry intact. No other areas of concern noted. All interventions in place.

## 2019-08-16 NOTE — PROGRESS NOTES
Trauma / Surgical Daily Progress Note    Date of Service  8/16/2019    Chief Complaint  59 y.o. male admitted 8/15/2019 with CELIAC ARTERY ANEURYSM    Interval Events    PO day # 1 Open Repair of Celiac Artery Aneurysm   Adequate BP control.  No IV antihypertensive drips   Clear liquid diet  DC crowder / PCA  Oral narcotics     - Clinically stable at this time, transfer to [facility]   - Counseled       Review of Systems  Review of Systems   Respiratory: Negative for shortness of breath.    Cardiovascular: Negative for chest pain.   Gastrointestinal: Positive for abdominal pain. Negative for nausea and vomiting.        Hungry         Vital Signs  Temp:  [36.3 °C (97.3 °F)-36.3 °C (97.4 °F)] 36.3 °C (97.3 °F)  Pulse:  [] 88  Resp:  [18-33] 23  BP: ()/(51-72) 121/66  SpO2:  [91 %-100 %] 97 %    Physical Exam  Physical Exam   Constitutional: No distress.   Cardiovascular: Normal rate, regular rhythm and intact distal pulses.   Pulmonary/Chest: No respiratory distress. He exhibits no tenderness.   Abdominal: There is tenderness. There is no rebound and no guarding.   Obese  Incision dressed   Genitourinary:   Genitourinary Comments: Crowder to gravity    Musculoskeletal: Normal range of motion.   Neurological: He is alert.   Skin: Skin is warm and dry.   Nursing note and vitals reviewed.      Laboratory  Recent Results (from the past 24 hour(s))   CBC without Differential    Collection Time: 08/15/19 12:15 PM   Result Value Ref Range    WBC 19.8 (H) 4.8 - 10.8 K/uL    RBC 4.39 (L) 4.70 - 6.10 M/uL    Hemoglobin 12.8 (L) 14.0 - 18.0 g/dL    Hematocrit 38.9 (L) 42.0 - 52.0 %    MCV 88.6 81.4 - 97.8 fL    MCH 29.2 27.0 - 33.0 pg    MCHC 32.9 (L) 33.7 - 35.3 g/dL    RDW 50.4 (H) 35.9 - 50.0 fL    Platelet Count 261 164 - 446 K/uL    MPV 11.2 9.0 - 12.9 fL   Comp Metabolic Panel    Collection Time: 08/15/19  1:45 PM   Result Value Ref Range    Sodium 144 135 - 145 mmol/L    Potassium 3.8 3.6 - 5.5 mmol/L     Chloride 111 96 - 112 mmol/L    Co2 25 20 - 33 mmol/L    Anion Gap 8.0 0.0 - 11.9    Glucose 198 (H) 65 - 99 mg/dL    Bun 17 8 - 22 mg/dL    Creatinine 0.87 0.50 - 1.40 mg/dL    Calcium 8.6 8.5 - 10.5 mg/dL    AST(SGOT) 104 (H) 12 - 45 U/L    ALT(SGPT) 124 (H) 2 - 50 U/L    Alkaline Phosphatase 95 30 - 99 U/L    Total Bilirubin 0.6 0.1 - 1.5 mg/dL    Albumin 3.2 3.2 - 4.9 g/dL    Total Protein 6.1 6.0 - 8.2 g/dL    Globulin 2.9 1.9 - 3.5 g/dL    A-G Ratio 1.1 g/dL   ESTIMATED GFR    Collection Time: 08/15/19  1:45 PM   Result Value Ref Range    GFR If African American >60 >60 mL/min/1.73 m 2    GFR If Non African American >60 >60 mL/min/1.73 m 2   CBC without Differential    Collection Time: 08/16/19  4:40 AM   Result Value Ref Range    WBC 14.3 (H) 4.8 - 10.8 K/uL    RBC 4.31 (L) 4.70 - 6.10 M/uL    Hemoglobin 12.2 (L) 14.0 - 18.0 g/dL    Hematocrit 39.0 (L) 42.0 - 52.0 %    MCV 90.5 81.4 - 97.8 fL    MCH 28.3 27.0 - 33.0 pg    MCHC 31.3 (L) 33.7 - 35.3 g/dL    RDW 50.8 (H) 35.9 - 50.0 fL    Platelet Count 206 164 - 446 K/uL    MPV 10.0 9.0 - 12.9 fL   Basic Metabolic Panel    Collection Time: 08/16/19  4:40 AM   Result Value Ref Range    Sodium 141 135 - 145 mmol/L    Potassium 4.3 3.6 - 5.5 mmol/L    Chloride 110 96 - 112 mmol/L    Co2 27 20 - 33 mmol/L    Glucose 127 (H) 65 - 99 mg/dL    Bun 15 8 - 22 mg/dL    Creatinine 0.68 0.50 - 1.40 mg/dL    Calcium 8.6 8.5 - 10.5 mg/dL    Anion Gap 4.0 0.0 - 11.9   APTT    Collection Time: 08/16/19  4:40 AM   Result Value Ref Range    APTT 28.0 24.7 - 36.0 sec   Prothrombin Time    Collection Time: 08/16/19  4:40 AM   Result Value Ref Range    PT 15.7 (H) 12.0 - 14.6 sec    INR 1.22 (H) 0.87 - 1.13   ESTIMATED GFR    Collection Time: 08/16/19  4:40 AM   Result Value Ref Range    GFR If African American >60 >60 mL/min/1.73 m 2    GFR If Non African American >60 >60 mL/min/1.73 m 2       Fluids    Intake/Output Summary (Last 24 hours) at 8/16/2019 1031  Last data filed at  8/16/2019 0800  Gross per 24 hour   Intake 3484.5 ml   Output 2520 ml   Net 964.5 ml       Core Measures & Quality Metrics  Labs reviewed, Medications reviewed and Radiology images reviewed  Nicolas catheter: One or Two Days Post Surgery (Day of Surgery being Day 0)  Central line in place: Need for access    DVT Prophylaxis: Not indicated at this time, ambulatory  DVT prophylaxis - mechanical: SCDs  Ulcer prophylaxis: Not indicated    Assessed for rehab: Patient returned to prior level of function, rehabilitation not indicated at this time    Total Score: 0    ETOH Screening    Assessment/Plan  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 Resumed maintenance medication.        Discussed patient condition with Patient and trauma surgery, Dr. Torin Kennedy.

## 2019-08-17 PROCEDURE — 700111 HCHG RX REV CODE 636 W/ 250 OVERRIDE (IP): Performed by: SURGERY

## 2019-08-17 PROCEDURE — 770006 HCHG ROOM/CARE - MED/SURG/GYN SEMI*

## 2019-08-17 PROCEDURE — 700102 HCHG RX REV CODE 250 W/ 637 OVERRIDE(OP): Performed by: SURGERY

## 2019-08-17 PROCEDURE — 94660 CPAP INITIATION&MGMT: CPT

## 2019-08-17 PROCEDURE — A9270 NON-COVERED ITEM OR SERVICE: HCPCS | Performed by: SURGERY

## 2019-08-17 RX ORDER — ZOLPIDEM TARTRATE 5 MG/1
5 TABLET ORAL NIGHTLY PRN
Status: DISCONTINUED | OUTPATIENT
Start: 2019-08-17 | End: 2019-08-18 | Stop reason: HOSPADM

## 2019-08-17 RX ORDER — DIPHENHYDRAMINE HCL 25 MG
25-50 TABLET ORAL EVERY 6 HOURS PRN
Status: DISCONTINUED | OUTPATIENT
Start: 2019-08-17 | End: 2019-08-18 | Stop reason: HOSPADM

## 2019-08-17 RX ADMIN — ACETAMINOPHEN 1000 MG: 500 TABLET ORAL at 12:16

## 2019-08-17 RX ADMIN — KETOROLAC TROMETHAMINE 30 MG: 30 INJECTION, SOLUTION INTRAMUSCULAR at 04:45

## 2019-08-17 RX ADMIN — ACETAMINOPHEN 1000 MG: 500 TABLET ORAL at 17:13

## 2019-08-17 RX ADMIN — ACETAMINOPHEN 1000 MG: 500 TABLET ORAL at 04:45

## 2019-08-17 RX ADMIN — KETOROLAC TROMETHAMINE 30 MG: 30 INJECTION, SOLUTION INTRAMUSCULAR at 12:16

## 2019-08-17 RX ADMIN — KETOROLAC TROMETHAMINE 30 MG: 30 INJECTION, SOLUTION INTRAMUSCULAR at 17:13

## 2019-08-17 RX ADMIN — ZOLPIDEM TARTRATE 5 MG: 5 TABLET ORAL at 21:59

## 2019-08-17 RX ADMIN — AMLODIPINE BESYLATE 5 MG: 10 TABLET ORAL at 04:45

## 2019-08-17 NOTE — CARE PLAN
Problem: Pain Management  Goal: Pain level will decrease to patient's comfort goal  Outcome: PROGRESSING AS EXPECTED   Pt states pain is under control with current scheduled pain regimen. Discussed use of pain medications at home and availability of oxycodone at this time if needed.  Pt has denied need for more meds than already administered.    Problem: Venous Thromboembolism (VTW)/Deep Vein Thrombosis (DVT) Prevention:  Goal: Patient will participate in Venous Thrombosis (VTE)/Deep Vein Thrombosis (DVT)Prevention Measures  Outcome: PROGRESSING AS EXPECTED  Pt out of bed walking x 4 today, SCDs removed to prevent fall hazard while patient up independently in room and quintana.     Problem: Bowel/Gastric:  Goal: Will not experience complications related to bowel motility  Outcome: PROGRESSING AS EXPECTED  Pt has been passing flatus today, denies nausea or abdominal cramping or pain.  Tolerating full liquid diet without problems.     Problem: Discharge Barriers/Planning  Goal: Patient's continuum of care needs will be met  Outcome: PROGRESSING AS EXPECTED  Discussed discharge criteria with patient and wife regarding pain control, positive bowel sounds and flatus, tolerating fluids and nutrition.  They expressed understanding and are hoping for discharge tomorrow.

## 2019-08-17 NOTE — PROGRESS NOTES
Dr. Gan in to round on patient, dressing to midline changed, incision without signs of infection, pt denies pain.  Pt advanced to full liquid diet and encouraged to get out of bed to ambulate.  Discussed discharge criteria with pt, he expressed understanding, hopes for discharge tomorrow or Monday.

## 2019-08-17 NOTE — PROGRESS NOTES
"Vascular    No acute events  Pain controlled without IV pain medication  No N/V  Tolerating CLD  No F/BM yet  Has been up to edge of bed but not ambulating yet    /81   Pulse 99   Temp 37.4 °C (99.3 °F) (Temporal)   Resp 18   Ht 1.778 m (5' 10\")   Wt 123.3 kg (271 lb 13.2 oz)   SpO2 90%   BMI 39.00 kg/m²     Abdomen soft, mildly distended, mildly TTP, no R/G  Incision CDI with staples, bandage changed    A/P)  8/15: Open repair of celiac artery aneurysm    Doing well  Advance to FLD  Await bowel function  Ambulate as tolerated    Home 1-2 days depending on clinical progress.    Montez Gan MD  [facility] (General and Vascular Surgery)  Cell: [phone] (text or call is fine, if you don't reach me please try my office)  Office: [phone]  __________________________________________________________________  Patient:Jhonatan Fonseca   MRN:[id number]   CSN:[id number]    8/17/2019    9:05 AM    "

## 2019-08-17 NOTE — CARE PLAN
Problem: Knowledge Deficit  Goal: Knowledge of disease process/condition, treatment plan, diagnostic tests, and medications will improve  Outcome: PROGRESSING AS EXPECTED  Note:   Pt reports understanding of plan of care and has no questions at this time  Goal: Knowledge of the prescribed therapeutic regimen will improve  Outcome: PROGRESSING AS EXPECTED  Note:   Pt reports understanding of plan of care and has no questions at this time

## 2019-08-17 NOTE — PROGRESS NOTES
"Pt arrived to room T424-2. AA&Ox4. Has no c/o pain at this time, no c/o n/t, SOB. Pt states feeling slightly nauseous. Midline incision with island dressing intact. Left IJ site dressing intact. Pt on clear liquid diet. Up with one person assist. Abdomen soft and tender. -flatus, -BM. Hypoactive BS. Plan of care discussed. Bed alarm on. Call light within reach. /68   Pulse 87   Temp 37.1 °C (98.8 °F) (Temporal)   Resp 15   Ht 1.778 m (5' 10\")   Wt 123.3 kg (271 lb 13.2 oz)   SpO2 96%     "

## 2019-08-17 NOTE — PROGRESS NOTES
Pt laying in bed, call light within reach, bed lowered and locked, fall education reinforced. Pt is A&Ox4 and on 1L of oxygen via nasal cannula with CPAP at the bedside. Pt lung sounds are diminished in the lower lobes, bowel sounds are hypoactive in all four quadrants, heart sounds are within defined limits. Pt IV is clean,dry,intact,and patent and saline locked. Pt is up x1 assist to ambulate with a steady gait. Pt has a midline incision with island dressing in place CDI.

## 2019-08-17 NOTE — CARE PLAN
Problem: Pain Management  Goal: Pain level will decrease to patient's comfort goal  Outcome: PROGRESSING AS EXPECTED   Pt controlled with oral and iv pain meds    Problem: Venous Thromboembolism (VTW)/Deep Vein Thrombosis (DVT) Prevention:  Goal: Patient will participate in Venous Thrombosis (VTE)/Deep Vein Thrombosis (DVT)Prevention Measures  Outcome: PROGRESSING AS EXPECTED   SCDs in place.

## 2019-08-18 VITALS
WEIGHT: 271.83 LBS | SYSTOLIC BLOOD PRESSURE: 126 MMHG | HEART RATE: 103 BPM | RESPIRATION RATE: 18 BRPM | TEMPERATURE: 98.4 F | BODY MASS INDEX: 38.92 KG/M2 | HEIGHT: 70 IN | OXYGEN SATURATION: 94 % | DIASTOLIC BLOOD PRESSURE: 92 MMHG

## 2019-08-18 PROCEDURE — 700102 HCHG RX REV CODE 250 W/ 637 OVERRIDE(OP): Performed by: SURGERY

## 2019-08-18 PROCEDURE — 700111 HCHG RX REV CODE 636 W/ 250 OVERRIDE (IP): Performed by: SURGERY

## 2019-08-18 PROCEDURE — 94660 CPAP INITIATION&MGMT: CPT

## 2019-08-18 PROCEDURE — A9270 NON-COVERED ITEM OR SERVICE: HCPCS | Performed by: NURSE PRACTITIONER

## 2019-08-18 PROCEDURE — 700102 HCHG RX REV CODE 250 W/ 637 OVERRIDE(OP): Performed by: NURSE PRACTITIONER

## 2019-08-18 PROCEDURE — A9270 NON-COVERED ITEM OR SERVICE: HCPCS | Performed by: SURGERY

## 2019-08-18 RX ORDER — AMOXICILLIN 250 MG
2 CAPSULE ORAL 2 TIMES DAILY
Status: DISCONTINUED | OUTPATIENT
Start: 2019-08-18 | End: 2019-08-18 | Stop reason: HOSPADM

## 2019-08-18 RX ORDER — BISACODYL 10 MG
10 SUPPOSITORY, RECTAL RECTAL
Status: DISCONTINUED | OUTPATIENT
Start: 2019-08-18 | End: 2019-08-18 | Stop reason: HOSPADM

## 2019-08-18 RX ORDER — ONDANSETRON 4 MG/1
4 TABLET, FILM COATED ORAL EVERY 4 HOURS PRN
Qty: 20 TAB | Refills: 3 | Status: SHIPPED | OUTPATIENT
Start: 2019-08-18 | End: 2019-08-29

## 2019-08-18 RX ORDER — OXYCODONE HYDROCHLORIDE AND ACETAMINOPHEN 5; 325 MG/1; MG/1
1-2 TABLET ORAL EVERY 4 HOURS PRN
Qty: 30 TAB | Refills: 0 | Status: SHIPPED | OUTPATIENT
Start: 2019-08-18 | End: 2019-08-22

## 2019-08-18 RX ORDER — POLYETHYLENE GLYCOL 3350 17 G/17G
17 POWDER, FOR SOLUTION ORAL 3 TIMES DAILY PRN
Qty: 1 EACH | Refills: 1 | Status: SHIPPED | OUTPATIENT
Start: 2019-08-18 | End: 2019-08-29

## 2019-08-18 RX ORDER — DOCUSATE SODIUM 100 MG/1
100 CAPSULE, LIQUID FILLED ORAL 2 TIMES DAILY
Qty: 15 CAP | Refills: 3 | Status: SHIPPED | OUTPATIENT
Start: 2019-08-18 | End: 2019-08-29

## 2019-08-18 RX ORDER — POLYETHYLENE GLYCOL 3350 17 G/17G
1 POWDER, FOR SOLUTION ORAL
Status: DISCONTINUED | OUTPATIENT
Start: 2019-08-18 | End: 2019-08-18 | Stop reason: HOSPADM

## 2019-08-18 RX ADMIN — ACETAMINOPHEN 1000 MG: 500 TABLET ORAL at 11:50

## 2019-08-18 RX ADMIN — OXYCODONE HYDROCHLORIDE 5 MG: 5 TABLET ORAL at 02:39

## 2019-08-18 RX ADMIN — ACETAMINOPHEN 1000 MG: 500 TABLET ORAL at 05:33

## 2019-08-18 RX ADMIN — DIPHENHYDRAMINE HCL 50 MG: 25 TABLET ORAL at 02:39

## 2019-08-18 RX ADMIN — AMLODIPINE BESYLATE 5 MG: 10 TABLET ORAL at 05:33

## 2019-08-18 RX ADMIN — KETOROLAC TROMETHAMINE 30 MG: 30 INJECTION, SOLUTION INTRAMUSCULAR at 05:34

## 2019-08-18 NOTE — PROGRESS NOTES
Received report from evening RN.  Assumed care of patient.  Patient A&Ox4.  No complaints of pain at this time.  No nausea or vomiting.  No numbness/tingling.  ZHANG, strength 5/5.  BLE edema 1+ pitting.  Midline abdominal incision with dressing in place, CDI.  On room air with O2 saturations >90%.  Tolerating full liquid diet without nausea or vomiting.  +flatus, No BM.  Educated on the importance of ambulation, patient understands.  Plan of care discussed.  Call light within reach.  Bed in lowest position.

## 2019-08-18 NOTE — PROGRESS NOTES
Patient given discharge instructions and prescriptions.  Patient demonstrated understanding.  All questions answered.  PIV removed.  Patient to follow up with Dr. Morales in 1-2 weeks.  Patient discharged home with son and wife via wheelchair.

## 2019-08-18 NOTE — PROGRESS NOTES
"Vascular    Minimal pain  Tolerating PO  + flatus, - BM yet  Ambulating frequently  /88   Pulse 97   Temp 36.9 °C (98.5 °F) (Temporal)   Resp 18   Ht 1.778 m (5' 10\")   Wt 123.3 kg (271 lb 13.2 oz)   SpO2 91%   BMI 39.00 kg/m²   Abdomen soft, nondistended, minimal TTP  Incision CDI    A/P)  Home today  FU 2 weeks after surgery for staple removal    Montez Gan MD  [facility] (General and Vascular Surgery)  Cell: [phone] (text or call is fine, if you don't reach me please try my office)  Office: [phone]  __________________________________________________________________  Patient:Jhonatan Fonseca   MRN:[id number]   CSN:[id number]    8/18/2019    1:02 PM    "

## 2019-08-18 NOTE — DISCHARGE INSTRUCTIONS
Discharge Instructions    Discharged to home by car with relative. Discharged via wheelchair, hospital escort: Yes.  Special equipment needed: Not Applicable    Be sure to schedule a follow-up appointment with your primary care doctor or any specialists as instructed.     Discharge Plan:   Diet Plan: Discussed  Activity Level: Discussed  Confirmed Follow up Appointment: Patient to Call and Schedule Appointment  Confirmed Symptoms Management: Discussed  Medication Reconciliation Updated: Yes  Influenza Vaccine Indication: Patient Refuses    I understand that a diet low in cholesterol, fat, and sodium is recommended for good health. Unless I have been given specific instructions below for another diet, I accept this instruction as my diet prescription.   Other diet: Regular, as tolerated     Special Instructions: None    · Is patient discharged on Warfarin / Coumadin?   No     Depression / Suicide Risk    As you are discharged from this [facility] facility, it is important to learn how to keep safe from harming yourself.    Recognize the warning signs:  · Abrupt changes in personality, positive or negative- including increase in energy   · Giving away possessions  · Change in eating patterns- significant weight changes-  positive or negative  · Change in sleeping patterns- unable to sleep or sleeping all the time   · Unwillingness or inability to communicate  · Depression  · Unusual sadness, discouragement and loneliness  · Talk of wanting to die  · Neglect of personal appearance   · Rebelliousness- reckless behavior  · Withdrawal from people/activities they love  · Confusion- inability to concentrate     If you or a loved one observes any of these behaviors or has concerns about self-harm, here's what you can do:  · Talk about it- your feelings and reasons for harming yourself  · Remove any means that you might use to hurt yourself (examples: pills, rope, extension cords, firearm)  · Get professional help from the  community (Mental Health, Substance Abuse, psychological counseling)  · Do not be alone:Call your Safe Contact- someone whom you trust who will be there for you.  · Call your local CRISIS HOTLINE 277-7525 or 618-649-2471  · Call your local Children's Mobile Crisis Response Team [place] (458) 611-9363 or www.[employer]  · Call the toll free National Suicide Prevention Hotlines   · National Suicide Prevention Lifeline 235-321-XUSI (7208)  · Trusted Opinion Line Network 800-SUICIDE (573-3667)            DISCHARGE INSTRUCTIONS  ----------------------------------------------  Procedure: Abdominal Exploration for Repair of Celiac Artery Aneurysm    1. DIET: Upon discharge from the hospital it is recommended you eat a soft diet for 1-2 weeks.  After that you may resume your normal preoperative diet.    2. ACTIVITIES: After discharge from the hospital, you may resume full routine activities. However, there should be no heavy lifting (greater than 15 pounds) and no strenuous activities for 6 weeks after your operation. Otherwise, routine activities of daily living such as walking and going up and down stairs are acceptable.    3. DRIVING: You may drive whenever you are off pain medications and are able to perform the activities needed to drive, i.e. turning, bending, twisting, etc.    4. BATHING: You may get the bandage wet at any time after leaving the hospital. You may shower, but do not submerge in a bath for at least a week. You may remove the bandage on 8/20 and a new bandage is not required unless you develop drainage from the incision.    5. BOWEL FUNCTION: Constipation is common after an operation, especially with pain medications. The combination of pain medication and decreased activity level can cause constipation in otherwise normal patients. If you feel this is occurring, take a laxative (Milk of Magnesia, Ex-Lax, Senokot, Miralax, Magnesium Citrate) until the problem has resolved.    6. PAIN  MEDICATION: You will be given a prescription for pain medication at discharge. Please take these as directed. It is important to remember not to take medications on an empty stomach as this may cause nausea.    7.CALL IF YOU HAVE: (1) Fevers to more than 101.5F, (2) Unusual chest or leg pain, (3) Drainage or fluid from incision that may be foul smelling, increased tenderness or soreness at the wound or the wound edges are no longer together, redness or swelling at the incision site. Please do not hesitate to call with any other questions.     8. APPOINTMENT: Contact our office at [phone] for a follow-up appointment 10 days following discharge for staple removal.    If you have any additional questions, please do not hesitate to call the office and speak to either myself or the physician on call.    Office address:  Montez Gan MD, [facility]  [address] [place] [address], [address], [place] [address]  [phone]

## 2019-08-18 NOTE — CARE PLAN
Problem: Pain Management  Goal: Pain level will decrease to patient's comfort goal  Outcome: PROGRESSING AS EXPECTED  Note:   Patient's pain well controlled at this time.  Resting comfortable in the chair, tolerating well.       Problem: Pain Management  Goal: Pain level will decrease to patient's comfort goal  Outcome: PROGRESSING AS EXPECTED  Note:   Patient's pain well controlled at this time.  Resting comfortable in the chair, tolerating well.

## 2019-08-27 NOTE — DISCHARGE SUMMARY
Discharge Summary    CHIEF COMPLAINT ON ADMISSION  Celiac artery aneurysm    Reason for Admission  CELIAC ARTERY ANEURYSM     Admission Date  8/15/2019    CODE STATUS  Prior    HPI & HOSPITAL COURSE  This is a 59 y.o. male here with a large celiac artery aneurysm.  Patient was admitted on the morning of surgery for open repair.  The patient underwent open repair of the celiac artery aneurysm.  The patient's postoperative course was  Unremarkable.  He did very well and the postprocedure period.         Therefore, he is discharged in good and stable condition to home with close outpatient follow-up.    The patient recovered much more quickly than anticipated on admission.    Discharge Date  8/18/2019    FOLLOW UP ITEMS POST DISCHARGE  One week     DISCHARGE DIAGNOSES  Active Problems:    Essential hypertension POA: Yes    Pure hypercholesterolemia POA: Yes  Resolved Problems:    Celiac artery aneurysm (HCC) POA: Yes      FOLLOW UP  Future Appointments   Date Time Provider Department Center   8/29/2019  8:45 AM GINA Schulz   9/12/2019  9:00 AM Kahlil Ng Jr., R.D. [facility]   11/21/2019 11:20 AM Leigh Bishop M.D. [facility] NANCY Morales M.D.  [address]  [address]  [facility] [address] [phone]    On 8/29/2019  For suture removal    Leigh Bishop M.D.  [address]  [address]  [facility] [address]  [phone]            MEDICATIONS ON DISCHARGE     Medication List      START taking these medications      Instructions   docusate sodium 100 MG Caps  Commonly known as:  COLACE   Doctor's comments:  Take this for 5 days after surgery.  Stop taking this if you have loose stool  Take 1 Cap by mouth 2 times a day.  Dose:  100 mg     ondansetron 4 MG Tabs tablet  Commonly known as:  ZOFRAN   Take 1 Tab by mouth every four hours as needed for Nausea/Vomiting.  Dose:  4 mg     polyethylene glycol/lytes Pack  Commonly known as:  MIRALAX   Take 1  Packet by mouth 3 times a day as needed (Use as needed to have a bowel movement. OK to buy over-the-counter).  Dose:  17 g        CONTINUE taking these medications      Instructions   allopurinol 300 MG Tabs  Commonly known as:  ZYLOPRIM   Take 300 mg by mouth every evening.  Dose:  300 mg     amLODIPine 5 MG Tabs  Commonly known as:  NORVASC   TAKE 1 TABLET BY MOUTH  EVERY DAY     aspirin EC 81 MG Tbec  Commonly known as:  ECOTRIN   Take 81 mg by mouth every day.  Dose:  81 mg     atorvastatin 20 MG Tabs  Commonly known as:  LIPITOR   Take 20 mg by mouth every evening.  Dose:  20 mg     bisoprolol 5 MG Tabs  Commonly known as:  ZEBETA   TAKE 1 TABLET BY MOUTH  EVERY DAY     ergocalciferol 45084 UNIT capsule  Commonly known as:  DRISDOL   Take 1 Cap by mouth every 7 days.  Dose:  50,000 Units     Fiber 0.52 GM Caps   Take 2 Caps by mouth 2 Times a Day.  Dose:  2 Cap     fish oil 1000 MG Caps capsule   Take 1,000 mg by mouth every evening.  Dose:  1,000 mg     fluticasone 50 MCG/ACT nasal spray  Commonly known as:  FLONASE   USE 1 SPRAY IN EACH NOSTRIL EVERY DAY     loratadine 10 MG Tabs  Commonly known as:  CLARITIN   Take 10 mg by mouth every evening.  Dose:  10 mg     montelukast 10 MG Tabs  Commonly known as:  SINGULAIR   Take 10 mg by mouth every evening.  Dose:  10 mg     therapeutic multivitamin-minerals Tabs   Take 1 Tab by mouth every day.  Dose:  1 Tab        ASK your doctor about these medications      Instructions   oxyCODONE-acetaminophen 5-325 MG Tabs  Commonly known as:  PERCOCET  Ask about: Should I take this medication?   Take 1-2 Tabs by mouth every four hours as needed for Moderate Pain or Severe Pain for up to 4 days.  Dose:  1-2 Tab            Allergies  Allergies   Allergen Reactions   • Lisinopril Hives       DIET  No orders of the defined types were placed in this encounter.      ACTIVITY  As tolerated.  Weight bearing as tolerated    CONSULTATIONS  Surgical Critical Care Service      PROCEDURES  Open repair celiac artery aneurysm    LABORATORY  Lab Results   Component Value Date    SODIUM 141 08/16/2019    POTASSIUM 4.3 08/16/2019    CHLORIDE 110 08/16/2019    CO2 27 08/16/2019    GLUCOSE 127 (H) 08/16/2019    BUN 15 08/16/2019    CREATININE 0.68 08/16/2019        Lab Results   Component Value Date    WBC 14.3 (H) 08/16/2019    HEMOGLOBIN 12.2 (L) 08/16/2019    HEMATOCRIT 39.0 (L) 08/16/2019    PLATELETCT 206 08/16/2019        Total time of the discharge process exceeds 20 minutes.

## 2019-08-29 ENCOUNTER — OFFICE VISIT (OUTPATIENT)
Dept: HEALTH INFORMATION MANAGEMENT | Facility: MEDICAL CENTER | Age: 59
End: 2019-08-29
Payer: COMMERCIAL

## 2019-08-29 VITALS
BODY MASS INDEX: 37.98 KG/M2 | WEIGHT: 265.3 LBS | HEART RATE: 93 BPM | HEIGHT: 70 IN | SYSTOLIC BLOOD PRESSURE: 136 MMHG | DIASTOLIC BLOOD PRESSURE: 86 MMHG | OXYGEN SATURATION: 94 %

## 2019-08-29 DIAGNOSIS — E78.00 PURE HYPERCHOLESTEROLEMIA: ICD-10-CM

## 2019-08-29 DIAGNOSIS — R73.9 HYPERGLYCEMIA: ICD-10-CM

## 2019-08-29 DIAGNOSIS — G47.33 OSA (OBSTRUCTIVE SLEEP APNEA): ICD-10-CM

## 2019-08-29 DIAGNOSIS — R53.82 CHRONIC FATIGUE: ICD-10-CM

## 2019-08-29 DIAGNOSIS — I10 ESSENTIAL HYPERTENSION: ICD-10-CM

## 2019-08-29 DIAGNOSIS — N20.0 NEPHROLITHIASIS: ICD-10-CM

## 2019-08-29 PROCEDURE — 99214 OFFICE O/P EST MOD 30 MIN: CPT | Performed by: INTERNAL MEDICINE

## 2019-09-12 ENCOUNTER — APPOINTMENT (OUTPATIENT)
Dept: HEALTH INFORMATION MANAGEMENT | Facility: MEDICAL CENTER | Age: 59
End: 2019-09-12
Payer: COMMERCIAL

## 2019-10-07 ENCOUNTER — HOSPITAL ENCOUNTER (OUTPATIENT)
Dept: LAB | Facility: MEDICAL CENTER | Age: 59
End: 2019-10-07
Attending: INTERNAL MEDICINE
Payer: COMMERCIAL

## 2019-10-07 DIAGNOSIS — R73.9 HYPERGLYCEMIA: ICD-10-CM

## 2019-10-07 LAB
EST. AVERAGE GLUCOSE BLD GHB EST-MCNC: 111 MG/DL
HBA1C MFR BLD: 5.5 % (ref 0–5.6)

## 2019-10-07 PROCEDURE — 83036 HEMOGLOBIN GLYCOSYLATED A1C: CPT

## 2019-10-07 PROCEDURE — 36415 COLL VENOUS BLD VENIPUNCTURE: CPT

## 2019-10-10 ENCOUNTER — OFFICE VISIT (OUTPATIENT)
Dept: HEALTH INFORMATION MANAGEMENT | Facility: MEDICAL CENTER | Age: 59
End: 2019-10-10
Payer: COMMERCIAL

## 2019-10-10 VITALS
SYSTOLIC BLOOD PRESSURE: 120 MMHG | WEIGHT: 259.6 LBS | OXYGEN SATURATION: 93 % | HEIGHT: 70 IN | HEART RATE: 74 BPM | BODY MASS INDEX: 37.16 KG/M2 | DIASTOLIC BLOOD PRESSURE: 80 MMHG

## 2019-10-10 DIAGNOSIS — E78.00 PURE HYPERCHOLESTEROLEMIA: ICD-10-CM

## 2019-10-10 DIAGNOSIS — R73.9 HYPERGLYCEMIA: ICD-10-CM

## 2019-10-10 DIAGNOSIS — G47.33 OSA (OBSTRUCTIVE SLEEP APNEA): ICD-10-CM

## 2019-10-10 DIAGNOSIS — I10 ESSENTIAL HYPERTENSION: ICD-10-CM

## 2019-10-10 PROCEDURE — 99214 OFFICE O/P EST MOD 30 MIN: CPT | Performed by: INTERNAL MEDICINE

## 2019-10-10 NOTE — PROGRESS NOTES
Bariatric Medicine Follow Up  Chief Complaint   Patient presents with   • Weight Gain       History of Present Illness:   Jhonatan Fonseca is a 59 y.o. male who presents for weight management follow-up and to help address co-morbidities caused by overweight, as below.    During the patient's last visit, the following were discussed and recommended:  Weight Goal: 3-5% wt loss each month (pt goal weight is less than 225 lb, then 185 lb)  Diet: Tracking regularly with my fitness pal, just loves it with printouts  Goal is less than 100 g CHO per day, around 1800 kcal per day  Physical Activity: Resume swimming when cleared by surgery  Risk level for moderate/vigorous exercise program: Moderate given recent abdominal surgery  New Rx: None per patient request  Behavior change: Continue tracking, mindful eating  Follow-up: 1 month    Since his abdominal surgery, he has required more time to recover than he expected.  His eating has not been what he had hoped, having more carbohydrate, calorie dense foods that feel more comforting and are less nauseating.  Feeling nauseated with greens, salads but gradually improving.  Having less nausea now.    He continues to track very closely his intake with my fitness pal, brings in printouts, just loves the tracking in the accountability.  He does that shopping, is looking for new ideas for what to make although his wife, who does the cooking, does not always want to prepare so many different foods.    Total kcal intake around 1800, 150 g CHO, 70 g protein on an average day in the last week.    I FG: Recent A1c normal 10/2019, not on glucose lowering agent  HTN: Blood pressure controlled on amlodipine  DK: Sleep stable  HLD: Continue statin for lipid control, with last lipid profile normal 7/2019    Exercise:   Just cleared to increase activity  Wants to resume gym, swimming     Review of Systems   Denies abdominal pain.  Nausea and bowel movements improving.  Denies worsening  "insomnia, weakness.  All other ROS were reviewed and are otherwise unchanged from my previous visit with patient.    Physical Exam:    /80 (BP Location: Left arm, Patient Position: Sitting, BP Cuff Size: Large adult)   Pulse 74   Ht 1.778 m (5' 10\")   Wt 117.8 kg (259 lb 9.6 oz)   SpO2 93%   BMI 37.25 kg/m²   Waist Measurement   Waist: 50.75 inch/inches  Weight change since last visit: -6 lb (-22 lb total)  Waist Circum change since last visit: -1.25 in (-3 in total)    Constitutional: Oriented to person, place, and time and well-developed, well-nourished, and in no distress.    Head: Normocephalic.   Musculoskeletal: Normal range of motion. No edema.   Neurological: Alert and oriented to person, place, and time. No muscle weakness.  Gait normal.   Skin: Warm and dry. Not diaphoretic.   Psychiatric: Mood, memory, affect and judgment normal.     Laboratory:   Recent labs reviewed.      Dietitian Assessment: RD visit with wt loss plateau per pt request    ASSESSMENT/PLAN:  Body mass index is 37.25 kg/m².    Obesity Stage ([place]): 2; Class 2    1. BMI 40.0-44.9, adult ([facility])     2. Essential hypertension     3. DK (obstructive sleep apnea)     4. Pure hypercholesterolemia     5. Hyperglycemia       The patient continues to do well.  He really feels accountable with tracking, finds it helps tremendously towards his weight loss goal.  Fasting glucose now normal.  Blood pressure well controlled.  Sleep stable.  Continue to monitor lipids, continues on statin.  Gradually increase activity as below to avoid loss in muscle mass with weight loss.    The patient and I have discussed at length and agree to the following recommendations, which are all addressing the above diagnoses:    Weight Goal: 3-5% wt loss each month (pt goal weight is <225 lb, then below 200)  Diet: Continue tracking with my fitness pal as he enjoys doing  <1800 kcal per day, strive for 100 g CHO per day and increasing protein intake " slightly  Physical Activity: Resume resistance exercise, swimming  Focus on adding resistance to avoid loss in skeletal muscle mass with weight loss  Risk level for moderate/vigorous exercise program: Moderate given recent abdominal surgery  New Rx: None per patient request  Behavior change: Gradually increase activity level as above  Follow-up: 6 wks    Patient's body mass index is 37.25 kg/m². Exercise and nutrition counseling were performed at this visit.

## 2019-11-14 ENCOUNTER — TELEPHONE (OUTPATIENT)
Dept: MEDICAL GROUP | Age: 59
End: 2019-11-14

## 2019-11-14 NOTE — TELEPHONE ENCOUNTER
1. Caller Name: Jhonatan Fonseca                                         Call Back Number: [phone]        Patient approves a detailed voicemail message: N\A    Called Pt, R/S to later date due to Dr. HATFIELD being VIVIEN IGLESIAS [facility]

## 2019-11-21 ENCOUNTER — OFFICE VISIT (OUTPATIENT)
Dept: HEALTH INFORMATION MANAGEMENT | Facility: MEDICAL CENTER | Age: 59
End: 2019-11-21
Payer: COMMERCIAL

## 2019-11-21 ENCOUNTER — APPOINTMENT (OUTPATIENT)
Dept: MEDICAL GROUP | Facility: MEDICAL CENTER | Age: 59
End: 2019-11-21
Payer: COMMERCIAL

## 2019-11-21 VITALS
HEIGHT: 70 IN | HEART RATE: 61 BPM | OXYGEN SATURATION: 96 % | WEIGHT: 254.1 LBS | SYSTOLIC BLOOD PRESSURE: 124 MMHG | DIASTOLIC BLOOD PRESSURE: 74 MMHG | BODY MASS INDEX: 36.38 KG/M2

## 2019-11-21 DIAGNOSIS — E78.00 PURE HYPERCHOLESTEROLEMIA: ICD-10-CM

## 2019-11-21 DIAGNOSIS — I10 ESSENTIAL HYPERTENSION: ICD-10-CM

## 2019-11-21 DIAGNOSIS — R53.82 CHRONIC FATIGUE: ICD-10-CM

## 2019-11-21 DIAGNOSIS — G47.33 OSA (OBSTRUCTIVE SLEEP APNEA): ICD-10-CM

## 2019-11-21 DIAGNOSIS — R73.9 HYPERGLYCEMIA: ICD-10-CM

## 2019-11-21 PROCEDURE — 99214 OFFICE O/P EST MOD 30 MIN: CPT | Performed by: INTERNAL MEDICINE

## 2019-11-21 ASSESSMENT — PATIENT HEALTH QUESTIONNAIRE - PHQ9: CLINICAL INTERPRETATION OF PHQ2 SCORE: 0

## 2019-12-06 DIAGNOSIS — J30.2 SEASONAL ALLERGIC RHINITIS, UNSPECIFIED TRIGGER: ICD-10-CM

## 2019-12-06 DIAGNOSIS — J30.2 SEASONAL ALLERGIES: ICD-10-CM

## 2019-12-09 ENCOUNTER — OFFICE VISIT (OUTPATIENT)
Dept: MEDICAL GROUP | Facility: MEDICAL CENTER | Age: 59
End: 2019-12-09
Payer: COMMERCIAL

## 2019-12-09 VITALS
HEIGHT: 70 IN | SYSTOLIC BLOOD PRESSURE: 110 MMHG | RESPIRATION RATE: 14 BRPM | HEART RATE: 62 BPM | TEMPERATURE: 98.7 F | WEIGHT: 256.6 LBS | OXYGEN SATURATION: 94 % | DIASTOLIC BLOOD PRESSURE: 68 MMHG | BODY MASS INDEX: 36.73 KG/M2

## 2019-12-09 DIAGNOSIS — R73.9 HYPERGLYCEMIA: ICD-10-CM

## 2019-12-09 DIAGNOSIS — Z11.59 ENCOUNTER FOR HEPATITIS C SCREENING TEST FOR LOW RISK PATIENT: ICD-10-CM

## 2019-12-09 DIAGNOSIS — N20.0 NEPHROLITHIASIS: ICD-10-CM

## 2019-12-09 DIAGNOSIS — E55.9 VITAMIN D DEFICIENCY: ICD-10-CM

## 2019-12-09 DIAGNOSIS — R74.8 ELEVATED ALKALINE PHOSPHATASE LEVEL: ICD-10-CM

## 2019-12-09 DIAGNOSIS — E78.00 PURE HYPERCHOLESTEROLEMIA: ICD-10-CM

## 2019-12-09 PROCEDURE — 99214 OFFICE O/P EST MOD 30 MIN: CPT | Performed by: INTERNAL MEDICINE

## 2019-12-09 RX ORDER — MONTELUKAST SODIUM 10 MG/1
TABLET ORAL
Qty: 90 TAB | Refills: 0 | Status: SHIPPED | OUTPATIENT
Start: 2019-12-09 | End: 2020-01-06

## 2019-12-09 NOTE — PROGRESS NOTES
CC:  Diagnoses of Pure hypercholesterolemia, Hyperglycemia, Vitamin D deficiency, BMI 36.0-36.9,adult, Nephrolithiasis, Elevated alkaline phosphatase level, and Encounter for hepatitis C screening test for low risk patient were pertinent to this visit.    HISTORY OF THE PRESENT ILLNESS: Patient is a 59 y.o. male. This pleasant patient is here today to follow-up.    He is here for routine follow-up.    Since last appointment he is working with Dr. Moyer, note from 11/22/2019 briefly reviewed, he is down over 30 pounds with a goal to lose 20 more pounds.  He did meet with orthopedic surgeon and indicated that if he still had significant symptoms with more weight loss that he may consider surgical route.    Note from Dr. Bloch reviewed from 6/17/2019 regarding his celiac artery which was repaired by Dr. 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  Patient has had normal postoperative period.  Denies any abdominal or other symptoms.  Labs from 7/22/2019 indicate reasonable control cholesterol total cholesterol 146, triglycerides 68, HDL 42 LDL 98 and he is tolerating atorvastatin 20 mg well.  Previously prediabetic remotely with a A1c of 6.2 this resolved to a level for 5.5 with his weight loss.    Patient requests urology consult for his history of nephrolithiasis.  He is on allopurinol.  Having kidney stones approximately once per year.  He has no urinary complaints during the day.  Says when he urinates at night (up due to other causes) there is not much urine volume, though he has trouble going back to sleep.  He will try to work on meditation and stress control as this may be related.  PSA remains within normal limits at 1.31 from labs 7/22/2019.    His vitamin D was low at a level 24 lab 7/22/19 has been on weekly ergocalciferol since then we plan to recheck.    Allergies: Lisinopril    Current Outpatient  "Medications Ordered in Epic   Medication Sig Dispense Refill   • allopurinol (ZYLOPRIM) 300 MG Tab Take 300 mg by mouth every evening.     • atorvastatin (LIPITOR) 20 MG Tab Take 20 mg by mouth every evening.     • loratadine (CLARITIN) 10 MG Tab Take 10 mg by mouth every evening.     • montelukast (SINGULAIR) 10 MG Tab Take 10 mg by mouth every evening.     • amLODIPine (NORVASC) 5 MG Tab TAKE 1 TABLET BY MOUTH  EVERY DAY 90 Tab 1   • fluticasone (FLONASE) 50 MCG/ACT nasal spray USE 1 SPRAY IN EACH NOSTRIL EVERY DAY 16 g 2   • Psyllium (FIBER) 0.52 GM Cap Take 2 Caps by mouth 2 Times a Day.     • Omega-3 Fatty Acids (FISH OIL) 1000 MG Cap capsule Take 1,000 mg by mouth every evening.     • therapeutic multivitamin-minerals (THERAGRAN-M) Tab Take 1 Tab by mouth every day.     • aspirin EC (ECOTRIN) 81 MG Tablet Delayed Response Take 81 mg by mouth every day.       No current [facility]-ordered facility-administered medications on file.        Past Medical History:   Diagnosis Date   • Anesthesia 08/13/2019    \"I was told I turned green on table during foot surgery about 20 years ago.\"   • Arthritis 08/13/2019    Left Knee, Right Hand    • Breath shortness 08/13/2019    Pt. states overweight & out of shape.   • Celiac artery aneurysm (HCC)    • Chickenpox    • Dyslipidemia    • High cholesterol    • Hypertension    • Kidney stone    • Sleep apnea     CPAP USE   • Snoring     DX DK       Past Surgical History:   Procedure Laterality Date   • AORTOFEMORAL BYPASS N/A 8/15/2019    Procedure: CREATION, bypass arterial, celiac artery aneurism repair;  Surgeon: Elie Morales M.D.;  Location: SURGERY [facility];  Service: General   • BONE SPUR EXCISION     • KNEE ARTHROSCOPY      repair micscus tear   • LITHOTRIPSY     • OTHER      Surgery for right shoulder dislocation and torn ligaments 1974.   • OTHER ORTHOPEDIC SURGERY      Left Foot Surgery x3 from broken foot in high school.   • OTHER ORTHOPEDIC SURGERY      Right " "Achilles Surgery       Social History     Tobacco Use   • Smoking status: Never Smoker   • Smokeless tobacco: Never Used   Substance Use Topics   • Alcohol use: Yes     Alcohol/week: 0.0 oz     Comment: 2/month   • Drug use: No       Social History     Patient does not qualify to have social determinant information on file (likely too young).   Social History Narrative   • Not on file       Family History   Problem Relation Age of Onset   • Cancer Brother         parathyroid   • Sleep Apnea Brother    • Diabetes Brother    • Sleep Apnea Brother    • Diabetes Father        ROS:     - Constitutional: Negative for fever, chills    - Eyes:   Negative for blurry vision, eye pain, discharge    - ENT:  Negative for hearing changes, ear pain, ear discharge, rhinorrhea, sinus congestion, sore throat     - Respiratory: Negative for cough, sputum production, chest congestion, dyspnea, wheezing, and crackles.      - Cardiovascular: Negative for chest pain, palpitations, orthopnea, and bilateral lower extremity edema.     - Gastrointestinal: Negative for nausea, vomiting, abdominal pain, hematochezia, melena, diarrhea, constipation, and greasy/foul-smelling stools.     - Genitourinary: Negative for dysuria    - Musculoskeletal: see hpi    - Skin: Negative for rash, itching, cyanotic skin color change.     - Neurological: Negative for  vertigo    - Endo:Negative for polyuria, heat/cold intolerance, excessive thirst    - Hem/lymphatic: Negative for easy bruising, blood clots, lymphedema, swollen glands    -Allergic/immun: Negative for allergic rhinitis    - Psychiatric/Behavioral: Negative for depression, suicidal/homicidal ideation and memory loss.      Exam: /68 (BP Location: Left arm, Patient Position: Sitting, BP Cuff Size: Adult)   Pulse 62   Temp 37.1 °C (98.7 °F) (Temporal)   Resp 14   Ht 1.778 m (5' 10\")   Wt 116.4 kg (256 lb 9.6 oz)   SpO2 94%  Body mass index is 36.82 kg/m².    General: Normal appearing. No " distress.  EYES: Conjunctiva clear lids without ptosis, pupils equal  EARS: Normal shape and contour   Pulmonary: Clear to ausculation.  Normal effort. No rales or wheezing.  Cardiovascular: Regular rate and rhythm without significant murmur.   Abdomen: Soft, nontender, nondistended. Normal bowel sounds.  Neurologic: Cranial nerves grossly nonfocal  Skin: Warm and dry.  No obvious lesions.  Musculoskeletal: Normal gait. No extremity cyanosis, clubbing, or edema.  Psych: Normal mood and affect. Alert and oriented x3. Judgment and insight is normal.      Assessment/Plan  1. Pure hypercholesterolemia  Stable, chronic condition, will continue omega fatty acids, atorvastatin 10 mg daily.  - Lipid Profile; Future    2. Vitamin D deficiency  We will assess control of vitamin D deficiency with labs.  Stable issue.  - VITAMIN D,25 HYDROXY; Future    4. BMI 36.0-36.9,adult  Stable issue, significantly improved.  Continue to monitor.    5. Nephrolithiasis  Chronic, stable, patient request urology follow-up in this is reasonable.  - Comp Metabolic Panel; Future  - URIC ACID; Future  - REFERRAL TO UROLOGY    6. Elevated alkaline phosphatase level  No focal symptoms, will repeat measurement with the significant weight loss.  Stable.  - Comp Metabolic Panel; Future    7. Encounter for hepatitis C screening test for low risk patient  Patient is agreeable as preventive health guideline given birth cohort.  - HEP C VIRUS ANTIBODY; Future    Return to clinic 6 months or sooner if needed          Please note that this dictation was created using voice recognition software. I have made every reasonable attempt to correct obvious errors, but I expect that there are errors of grammar and possibly content that I did not discover before finalizing the note.

## 2019-12-09 NOTE — TELEPHONE ENCOUNTER
Have we ever prescribed this med? Yes.  If yes, what date? 06/21/19(Adan)    Last OV: 04/05/19 with Renetta KING  Follow-up in 3 months, sooner if needed    Next OV: No Pending appt.    DX: Seasonal allergic rhinitis, unspecified trigger (J30.2); Seasonal allergies (J30.2)    Medications:   Requested Prescriptions     Pending Prescriptions Disp Refills   • montelukast (SINGULAIR) 10 MG Tab [Pharmacy Med Name: MONTELUKAST 10MG TABLET]       Sig: TAKE 1 TABLET BY MOUTH  EVERY DAY

## 2019-12-11 ENCOUNTER — HOSPITAL ENCOUNTER (OUTPATIENT)
Dept: LAB | Facility: MEDICAL CENTER | Age: 59
End: 2019-12-11
Attending: INTERNAL MEDICINE
Payer: COMMERCIAL

## 2019-12-11 DIAGNOSIS — E55.9 VITAMIN D DEFICIENCY: ICD-10-CM

## 2019-12-11 DIAGNOSIS — R74.8 ELEVATED ALKALINE PHOSPHATASE LEVEL: ICD-10-CM

## 2019-12-11 DIAGNOSIS — Z11.59 ENCOUNTER FOR HEPATITIS C SCREENING TEST FOR LOW RISK PATIENT: ICD-10-CM

## 2019-12-11 DIAGNOSIS — N20.0 NEPHROLITHIASIS: ICD-10-CM

## 2019-12-11 DIAGNOSIS — R73.9 HYPERGLYCEMIA: ICD-10-CM

## 2019-12-11 DIAGNOSIS — E78.00 PURE HYPERCHOLESTEROLEMIA: ICD-10-CM

## 2019-12-11 LAB
25(OH)D3 SERPL-MCNC: 27 NG/ML (ref 30–100)
ALBUMIN SERPL BCP-MCNC: 4 G/DL (ref 3.2–4.9)
ALBUMIN/GLOB SERPL: 1.3 G/DL
ALP SERPL-CCNC: 129 U/L (ref 30–99)
ALT SERPL-CCNC: 22 U/L (ref 2–50)
ANION GAP SERPL CALC-SCNC: 8 MMOL/L (ref 0–11.9)
AST SERPL-CCNC: 22 U/L (ref 12–45)
BILIRUB SERPL-MCNC: 0.4 MG/DL (ref 0.1–1.5)
BUN SERPL-MCNC: 16 MG/DL (ref 8–22)
CALCIUM SERPL-MCNC: 9.8 MG/DL (ref 8.5–10.5)
CHLORIDE SERPL-SCNC: 108 MMOL/L (ref 96–112)
CHOLEST SERPL-MCNC: 137 MG/DL (ref 100–199)
CO2 SERPL-SCNC: 26 MMOL/L (ref 20–33)
CREAT SERPL-MCNC: 0.76 MG/DL (ref 0.5–1.4)
FASTING STATUS PATIENT QL REPORTED: NORMAL
GLOBULIN SER CALC-MCNC: 3.1 G/DL (ref 1.9–3.5)
GLUCOSE SERPL-MCNC: 93 MG/DL (ref 65–99)
HCV AB SER QL: NEGATIVE
HDLC SERPL-MCNC: 54 MG/DL
LDLC SERPL CALC-MCNC: 73 MG/DL
POTASSIUM SERPL-SCNC: 4 MMOL/L (ref 3.6–5.5)
PROT SERPL-MCNC: 7.1 G/DL (ref 6–8.2)
SODIUM SERPL-SCNC: 142 MMOL/L (ref 135–145)
TRIGL SERPL-MCNC: 52 MG/DL (ref 0–149)
URATE SERPL-MCNC: 4.7 MG/DL (ref 2.5–8.3)

## 2019-12-11 PROCEDURE — 82306 VITAMIN D 25 HYDROXY: CPT

## 2019-12-11 PROCEDURE — 84550 ASSAY OF BLOOD/URIC ACID: CPT

## 2019-12-11 PROCEDURE — 80053 COMPREHEN METABOLIC PANEL: CPT

## 2019-12-11 PROCEDURE — 86803 HEPATITIS C AB TEST: CPT

## 2019-12-11 PROCEDURE — 36415 COLL VENOUS BLD VENIPUNCTURE: CPT

## 2019-12-11 PROCEDURE — 80061 LIPID PANEL: CPT

## 2020-01-06 ENCOUNTER — OFFICE VISIT (OUTPATIENT)
Dept: HEALTH INFORMATION MANAGEMENT | Facility: MEDICAL CENTER | Age: 60
End: 2020-01-06
Payer: COMMERCIAL

## 2020-01-06 VITALS
HEART RATE: 76 BPM | SYSTOLIC BLOOD PRESSURE: 126 MMHG | DIASTOLIC BLOOD PRESSURE: 78 MMHG | OXYGEN SATURATION: 93 % | HEIGHT: 70 IN | WEIGHT: 253.9 LBS | BODY MASS INDEX: 36.35 KG/M2

## 2020-01-06 DIAGNOSIS — R73.9 HYPERGLYCEMIA: ICD-10-CM

## 2020-01-06 DIAGNOSIS — G47.33 OSA (OBSTRUCTIVE SLEEP APNEA): ICD-10-CM

## 2020-01-06 DIAGNOSIS — J30.2 SEASONAL ALLERGIES: ICD-10-CM

## 2020-01-06 DIAGNOSIS — E78.00 PURE HYPERCHOLESTEROLEMIA: ICD-10-CM

## 2020-01-06 DIAGNOSIS — J30.2 SEASONAL ALLERGIC RHINITIS, UNSPECIFIED TRIGGER: ICD-10-CM

## 2020-01-06 DIAGNOSIS — I10 ESSENTIAL HYPERTENSION: ICD-10-CM

## 2020-01-06 PROCEDURE — 99214 OFFICE O/P EST MOD 30 MIN: CPT | Performed by: INTERNAL MEDICINE

## 2020-01-06 RX ORDER — MONTELUKAST SODIUM 10 MG/1
TABLET ORAL
Qty: 90 TAB | Refills: 0 | Status: SHIPPED | OUTPATIENT
Start: 2020-01-06 | End: 2020-02-19 | Stop reason: SDUPTHER

## 2020-01-06 ASSESSMENT — PATIENT HEALTH QUESTIONNAIRE - PHQ9: CLINICAL INTERPRETATION OF PHQ2 SCORE: 0

## 2020-01-06 NOTE — TELEPHONE ENCOUNTER
Have we ever prescribed this med? Yes.  If yes, what date? 12/09/19(Adan)    Last OV: 04/05/19 with Renetta KING  Follow-up in 3 months, sooner if needed    Next OV: No pending appt.     DX: Seasonal allergic rhinitis, unspecified trigger (J30.2); Seasonal allergies (J30.2)    Medications:   Requested Prescriptions     Pending Prescriptions Disp Refills   • montelukast (SINGULAIR) 10 MG Tab [Pharmacy Med Name: MONTELUKAST 10MG TABLET] 90 Tab 0     Sig: TAKE 1 TABLET BY MOUTH  EVERY DAY

## 2020-01-06 NOTE — PROGRESS NOTES
Bariatric Medicine Follow Up  Chief Complaint   Patient presents with   • Weight Gain       History of Present Illness:   Jhonatan Fonseca is a 59 y.o. male who presents for weight management follow-up and to help address co-morbidities caused by overweight, as below.    During the patient's last visit, the following were discussed and recommended:  Weight Goal: 3-5% wt loss each month (pt goal weight is 225 lb, low 200 by 6/2020)  Diet: Continue tracking as he is doing with my fitness pal and charts  Tracking around 1500 kcal per day, continue reducing CHO towards 100 g/day from 150  Snack lists given  Increase vegetables when tolerated  Physical Activity: Goal is 10,000 steps per day  New Rx: Wants to consider anti-obesity medication pending course if hunger and cravings increase  Behavior change: Increase activity level, keep tracking    He has really been trying hard to continue tracking using my fitness pal, uses it daily)'s out what he is eating along with macros.  Often going over 1500 leatha a day, usually around 1800 kcal per day and 140 g CHO per day.  He would like to know what else he can do for weight loss, has a goal of 225 pounds, which he would like to reach by this summer for his birthday.    He is interested in anti-obesity medication, but is reluctant given that phentermine/Topamax can cause nephrolithiasis, which he has experienced in the past.  He is willing to try metformin given his history of impaired fasting glucose.    He brings in 2 protein bars he has found that he would like to try, that he can afford.    DK: Stable  HTN: Well-controlled with amlodipine  HLD: Controlled with atorvastatin  I FG: Fasting glucose recently normal, not currently on hypoglycemic    Exercise:   Goal is 10,000 steps per day     Review of Systems   Denies lightheadedness, worsening fatigue.  Has evening cravings.  All other ROS were reviewed and are otherwise unchanged from my previous visit with  "patient.    Physical Exam:    /78 (BP Location: Left arm, Patient Position: Sitting, BP Cuff Size: Large adult)   Pulse 76   Ht 1.778 m (5' 10\")   Wt 115.2 kg (253 lb 14.4 oz)   SpO2 93%   BMI 36.43 kg/m²   Waist Measurement   Waist: 48.25 inch/inches  Weight change since last visit: 0 lb (-27 lb total)  Waist Circum change since last visit: 0 in (-5 in total)    Constitutional: Oriented to person, place, and time and well-developed, well-nourished, and in no distress.    Head: Normocephalic.   Musculoskeletal: Normal range of motion. No edema.   Neurological: Alert and oriented to person, place, and time. No muscle weakness.  Gait normal.   Skin: Warm and dry. Not diaphoretic.   Psychiatric: Mood, memory, affect and judgment normal.     Laboratory:   Recent labs reviewed.      Dietitian Assessment: n/a    ASSESSMENT/PLAN:  Body mass index is 36.43 kg/m².    Obesity Stage ([place]):  2; Class 2    1. BMI 36.0-36.9,adult     2. Pure hypercholesterolemia     3. DK (obstructive sleep apnea)     4. Essential hypertension     5. Hyperglycemia  Basic Metabolic Panel    HEMOGLOBIN A1C     The patient has maintained his weight loss over the holidays, which is a success for him.  Although fasting glucose well controlled, may benefit from metformin given his history of hyperglycemia and impaired fasting glucose, to help with some appetite suppression per his request.  Also work on reducing total kcal and CHO as below, continue tracking intake.  He is much more mindful about food choices, protein intake.  Continue to monitor lipids, sleep, blood pressure as he progresses through the program.    The patient and I have discussed at length and agree to the following recommendations, which are all addressing the above diagnoses:    Weight Goal: 3-5% wt loss each month (pt goal weight is 225 lb)  Diet: Continue tracking with my fitness pal, bring in printout again next visit  Reduce total kcal to 1700 kcals per day, " 100 g CHO per day  Fit crunch bar okay to use after gym workout midday  Watch after dinner snacks  Physical Activity: 10,000 steps per day as his goal  Rx: Start Metformin 500 at dinner to help reduce pm snacking  Side Effects: consent in chart  Behavior change: Adjust macros as above, increase physical activity  Follow-up: 1 mo    Patient's body mass index is 36.43 kg/m². Exercise and nutrition counseling were performed at this visit.

## 2020-01-28 NOTE — TELEPHONE ENCOUNTER
Was the patient seen in the last year in this department? Yes     lov       1/6/20  fv         2/18/20    Does patient have an active prescription for medications requested? Yes     metFORMIN (GLUCOPHAGE) 500 MG Tab    Received Request Via: Pharmacy

## 2020-01-30 ENCOUNTER — HOSPITAL ENCOUNTER (OUTPATIENT)
Dept: LAB | Facility: MEDICAL CENTER | Age: 60
End: 2020-01-30
Attending: INTERNAL MEDICINE
Payer: COMMERCIAL

## 2020-01-30 DIAGNOSIS — R73.9 HYPERGLYCEMIA: ICD-10-CM

## 2020-01-30 LAB
ANION GAP SERPL CALC-SCNC: 8 MMOL/L (ref 0–11.9)
BUN SERPL-MCNC: 19 MG/DL (ref 8–22)
CALCIUM SERPL-MCNC: 10.4 MG/DL (ref 8.5–10.5)
CHLORIDE SERPL-SCNC: 107 MMOL/L (ref 96–112)
CO2 SERPL-SCNC: 26 MMOL/L (ref 20–33)
CREAT SERPL-MCNC: 0.88 MG/DL (ref 0.5–1.4)
EST. AVERAGE GLUCOSE BLD GHB EST-MCNC: 117 MG/DL
FASTING STATUS PATIENT QL REPORTED: NORMAL
GLUCOSE SERPL-MCNC: 102 MG/DL (ref 65–99)
HBA1C MFR BLD: 5.7 % (ref 0–5.6)
POTASSIUM SERPL-SCNC: 4.1 MMOL/L (ref 3.6–5.5)
SODIUM SERPL-SCNC: 141 MMOL/L (ref 135–145)

## 2020-01-30 PROCEDURE — 83036 HEMOGLOBIN GLYCOSYLATED A1C: CPT

## 2020-01-30 PROCEDURE — 36415 COLL VENOUS BLD VENIPUNCTURE: CPT

## 2020-01-30 PROCEDURE — 80048 BASIC METABOLIC PNL TOTAL CA: CPT

## 2020-02-08 ENCOUNTER — PATIENT MESSAGE (OUTPATIENT)
Dept: MEDICAL GROUP | Facility: MEDICAL CENTER | Age: 60
End: 2020-02-08

## 2020-02-08 DIAGNOSIS — J30.2 SEASONAL ALLERGIC RHINITIS, UNSPECIFIED TRIGGER: ICD-10-CM

## 2020-02-18 ENCOUNTER — OFFICE VISIT (OUTPATIENT)
Dept: HEALTH INFORMATION MANAGEMENT | Facility: MEDICAL CENTER | Age: 60
End: 2020-02-18
Payer: COMMERCIAL

## 2020-02-18 VITALS
HEART RATE: 78 BPM | OXYGEN SATURATION: 94 % | WEIGHT: 251.4 LBS | DIASTOLIC BLOOD PRESSURE: 78 MMHG | SYSTOLIC BLOOD PRESSURE: 122 MMHG | HEIGHT: 70 IN | BODY MASS INDEX: 35.99 KG/M2

## 2020-02-18 DIAGNOSIS — E78.00 PURE HYPERCHOLESTEROLEMIA: ICD-10-CM

## 2020-02-18 DIAGNOSIS — R73.9 HYPERGLYCEMIA: ICD-10-CM

## 2020-02-18 DIAGNOSIS — G47.33 OSA (OBSTRUCTIVE SLEEP APNEA): ICD-10-CM

## 2020-02-18 DIAGNOSIS — I10 ESSENTIAL HYPERTENSION: ICD-10-CM

## 2020-02-18 DIAGNOSIS — N20.0 NEPHROLITHIASIS: ICD-10-CM

## 2020-02-18 DIAGNOSIS — E55.9 VITAMIN D DEFICIENCY: ICD-10-CM

## 2020-02-18 PROCEDURE — 99214 OFFICE O/P EST MOD 30 MIN: CPT | Performed by: INTERNAL MEDICINE

## 2020-02-18 ASSESSMENT — PATIENT HEALTH QUESTIONNAIRE - PHQ9: CLINICAL INTERPRETATION OF PHQ2 SCORE: 0

## 2020-02-18 NOTE — PROGRESS NOTES
Bariatric Medicine Follow Up  Chief Complaint   Patient presents with   • Weight Gain       History of Present Illness:   Jhonatan Fonseca is a 59 y.o. male who presents for weight management follow-up and to help address co-morbidities caused by overweight, as below.    During the patient's last visit, the following were discussed and recommended:  Weight Goal: 3-5% wt loss each month (pt goal weight is 225 lb)  Diet: Continue tracking with my fitness pal, bring in printout again next visit  Reduce total kcal to 1700 kcals per day, 100 g CHO per day  Fit crunch bar okay to use after gym workout midday  Watch after dinner snacks  Physical Activity: 10,000 steps per day as his goal  Rx: Start Metformin 500 at dinner to help reduce pm snacking  Side Effects: consent in chart  Behavior change: Adjust macros as above, increase physical activity    He is disappointed he has not lost more wt. he admits he has had a difficult 2 months, with his wife's illness, her desire to not cook but she eats poorly nonetheless.  He is trying to make sure he eats better but accommodate her needs as well.  He continues to track his intake, really likes that and is staying under [id number] kcal a day, less than 100 g CHO per day.    Has protein shake in am that he makes from protein powder, almond milk.  He has been eating out a fair amount still, trying to cut that back.  Eating out Chinese food which he divides between 2 of his meals per day, or has a sandwich at dinner.    HLD: Takes omega-3 fatty acids, statin, fiber daily  DK: Sleeping well  HTN: Blood pressure controlled on amlodipine  IFG:  He did not feel metformin helped, took 500 mg at dinner.  He wonders if he needs a higher dose or to take it in the morning.    Exercise:   Swimming 70 minutes 2-3 times per week  Walking 10,000 steps per day     Review of Systems   Fatigue.  Denies depression, insomnia.  Still having some evening cravings.  All other ROS were reviewed and are  "otherwise unchanged from my previous visit with patient.    Physical Exam:    /78 (BP Location: Left arm, Patient Position: Sitting, BP Cuff Size: Large adult)   Pulse 78   Ht 1.778 m (5' 10\")   Wt 114 kg (251 lb 6.4 oz)   SpO2 94%   BMI 36.07 kg/m²   Waist Measurement   Waist: 47 inch/inches  Weight change since last visit: -2.5 lb (-30 lb total)  Waist Circum change since last visit: -1.25 in (-6.5 in total)    Constitutional: Oriented to person, place, and time and well-developed, well-nourished, and in no distress.    Head: Normocephalic.   Musculoskeletal: Normal range of motion. No edema.   Neurological: Alert and oriented to person, place, and time. No muscle weakness.  Gait normal.   Skin: Warm and dry. Not diaphoretic.   Psychiatric: Mood, memory, affect and judgment normal.     Laboratory:   Recent labs reviewed.      Dietitian Assessment: n/a    ASSESSMENT/PLAN:  Body mass index is 36.07 kg/m².    Obesity Stage ([place]):  2; Class 2    1. BMI 36.0-36.9,adult     2. Essential hypertension     3. DK (obstructive sleep apnea)     4. Pure hypercholesterolemia     5. Hyperglycemia     6. Nephrolithiasis     7. Vitamin D deficiency       Although the patient has not made quite as much progress since his last visit with weight loss, he continues to lose weight, waist circumference, fat mass percent.  He is also maintained his hydration level.  Suggest trying higher dose of metformin to help with cravings later in the day.  Overall sleep and fatigue stable.  Denies depression.  Continue to monitor lipids.  Maintain hydration given history of nephrolithiasis.  Continue monitoring vitamin D.    The patient and I have discussed at length and agree to the following recommendations, which are all addressing the above diagnoses:    Weight Goal: 3-5% wt loss each month (pt goal weight is 225 lb)  Diet: Makes his own protein shake with almond milk or PP every morning  Continue tracking with my fitness pal, " bring in printout again next visit  Reduce total kcal to 1700 kcals per day, 100 g CHO per day  Fit crunch bar okay to use after gym workout midday  Watch after dinner snacks  Avoid excess meals out  Physical Activity: Swimming 70 minutes 2 times per week, walking 10,000 steps per day  Rx: metformin 500 mg bid  Will try 1000 mg qam, then bid if still snacking more in pm  Side Effects: consent in chart  Behavior change: Focus on his own meals if his wife is not supportive of healthier eating  Follow-up: 6 wks    Patient's body mass index is 36.07 kg/m². Exercise and nutrition counseling were performed at this visit.

## 2020-02-19 DIAGNOSIS — J30.2 SEASONAL ALLERGIES: ICD-10-CM

## 2020-02-19 DIAGNOSIS — Z76.0 MEDICATION REFILL: ICD-10-CM

## 2020-02-19 DIAGNOSIS — I10 ESSENTIAL HYPERTENSION: ICD-10-CM

## 2020-02-19 DIAGNOSIS — J30.2 SEASONAL ALLERGIC RHINITIS, UNSPECIFIED TRIGGER: ICD-10-CM

## 2020-02-19 RX ORDER — ATORVASTATIN CALCIUM 20 MG/1
20 TABLET, FILM COATED ORAL DAILY
Qty: 90 TAB | Refills: 0 | Status: SHIPPED | OUTPATIENT
Start: 2020-02-19 | End: 2020-03-13

## 2020-02-19 RX ORDER — AMLODIPINE BESYLATE 5 MG/1
5 TABLET ORAL
Qty: 90 TAB | Refills: 1 | Status: SHIPPED | OUTPATIENT
Start: 2020-02-19 | End: 2020-03-13

## 2020-02-19 RX ORDER — MONTELUKAST SODIUM 10 MG/1
10 TABLET ORAL
Qty: 90 TAB | Refills: 0 | Status: SHIPPED | OUTPATIENT
Start: 2020-02-19 | End: 2020-07-01

## 2020-02-19 RX ORDER — ALLOPURINOL 300 MG/1
300 TABLET ORAL EVERY EVENING
Qty: 90 TAB | Refills: 0 | Status: SHIPPED | OUTPATIENT
Start: 2020-02-19 | End: 2020-05-19

## 2020-03-01 RX ORDER — FLUTICASONE PROPIONATE 50 MCG
1 SPRAY, SUSPENSION (ML) NASAL
Qty: 16 G | Refills: 0 | Status: SHIPPED | OUTPATIENT
Start: 2020-03-01 | End: 2020-05-14 | Stop reason: SDUPTHER

## 2020-03-01 RX ORDER — BISOPROLOL FUMARATE 5 MG/1
5 TABLET, FILM COATED ORAL
Qty: 90 TAB
Start: 2020-03-01

## 2020-03-13 DIAGNOSIS — I10 ESSENTIAL HYPERTENSION: ICD-10-CM

## 2020-03-13 DIAGNOSIS — Z76.0 MEDICATION REFILL: ICD-10-CM

## 2020-03-15 RX ORDER — AMLODIPINE BESYLATE 5 MG/1
TABLET ORAL
Qty: 90 TAB | Refills: 1 | Status: SHIPPED | OUTPATIENT
Start: 2020-03-15 | End: 2020-03-30 | Stop reason: SDUPTHER

## 2020-03-15 RX ORDER — ATORVASTATIN CALCIUM 20 MG/1
TABLET, FILM COATED ORAL
Qty: 90 TAB | Refills: 0 | Status: SHIPPED | OUTPATIENT
Start: 2020-03-15 | End: 2020-07-30 | Stop reason: SDUPTHER

## 2020-03-18 ENCOUNTER — TELEPHONE (OUTPATIENT)
Dept: MEDICAL GROUP | Facility: MEDICAL CENTER | Age: 60
End: 2020-03-18

## 2020-03-18 NOTE — TELEPHONE ENCOUNTER
VOICEMAIL  1. Caller Name: Jhonatan Fonseca                        Call Back Number: [phone]      2. Message: Left VM for pt to cb to verify refill request      3. Patient approves office to leave a detailed voicemail/MyChart message: N\A

## 2020-03-26 ENCOUNTER — PATIENT MESSAGE (OUTPATIENT)
Dept: MEDICAL GROUP | Facility: MEDICAL CENTER | Age: 60
End: 2020-03-26

## 2020-03-30 ENCOUNTER — PATIENT MESSAGE (OUTPATIENT)
Dept: MEDICAL GROUP | Facility: MEDICAL CENTER | Age: 60
End: 2020-03-30

## 2020-03-30 DIAGNOSIS — I10 ESSENTIAL HYPERTENSION: ICD-10-CM

## 2020-03-30 DIAGNOSIS — N20.0 NEPHROLITHIASIS: ICD-10-CM

## 2020-03-30 RX ORDER — BISOPROLOL FUMARATE 5 MG/1
5 TABLET, FILM COATED ORAL DAILY
Qty: 90 TAB | Refills: 0 | Status: SHIPPED | OUTPATIENT
Start: 2020-03-30 | End: 2020-06-28

## 2020-03-30 RX ORDER — BISOPROLOL FUMARATE 5 MG/1
5 TABLET, FILM COATED ORAL DAILY
COMMUNITY
End: 2020-03-30 | Stop reason: SDUPTHER

## 2020-03-30 RX ORDER — AMLODIPINE BESYLATE 5 MG/1
5 TABLET ORAL
Qty: 90 TAB | Refills: 1 | Status: SHIPPED | OUTPATIENT
Start: 2020-03-30 | End: 2020-12-28 | Stop reason: SDUPTHER

## 2020-03-30 NOTE — TELEPHONE ENCOUNTER
These advise patient that Dr. Shaw ordered ct abp/pelvis should be able to assess hx anuerysm/scar.  Otherwise if patient prefers happy to see him in clinic.

## 2020-03-31 ENCOUNTER — APPOINTMENT (OUTPATIENT)
Dept: HEALTH INFORMATION MANAGEMENT | Facility: MEDICAL CENTER | Age: 60
End: 2020-03-31
Payer: COMMERCIAL

## 2020-04-23 ENCOUNTER — APPOINTMENT (OUTPATIENT)
Dept: RADIOLOGY | Facility: MEDICAL CENTER | Age: 60
End: 2020-04-23
Attending: UROLOGY
Payer: COMMERCIAL

## 2020-04-29 ENCOUNTER — PATIENT MESSAGE (OUTPATIENT)
Dept: MEDICAL GROUP | Facility: MEDICAL CENTER | Age: 60
End: 2020-04-29

## 2020-04-29 DIAGNOSIS — N20.0 NEPHROLITHIASIS: ICD-10-CM

## 2020-04-29 NOTE — TELEPHONE ENCOUNTER
From: Jhonatan Fonseca  To: Leigh Bishop M.D.  Sent: 4/29/2020 10:36 AM PDT  Subject: RE:Follow up on scar    Dr. HATFIELD,    I received a call from [facility] for Dr. Shaw - they said they need a (new) referral for [facility] to approve.. I asked them to call your office, however, I don't trust them to follow through.    Thanks again, in advance!    Norm      ----- Message -----   From:Leigh Bishop M.D.   Sent:4/6/2020 3:52 PM PDT   To:Jhonatan Fonseca   Subject:RE:Follow up on scar    Hi Norm, I have submitted your urology consult to [facility] Jones[facility]      ----- Message -----   From:Jhonatan Fonseca   Sent:4/6/2020 12:38 PM PDT   To:Leigh Bishop M.D.   Subject:RE:Follow up on scar    Hi PJ,    I'm not sure if/what [employer] as our NEW insurance company might need, if anything, to ensure they will cover the next appointment. If the appointment/referral was done through a previous insurance company, will it seamlessly go through?    Thanks!    Norm      ----- Message -----   From:Hugo Mayes, Med Ass't   Sent:4/6/2020 11:35 AM PDT   To:Jhonatan Fonseca   Subject:RE:Follow up on scar    Good morning, Jhonatan!    There is already an active referral in your chart for Dr. Shaw, So would you like a second referral created as well? We would just like some clarification.    KELSIE Mayes  Med Ass't      ----- Message -----   From:Jhonatan Fonseca   Sent:4/5/2020 7:00 AM PDT   To:Leigh Bishop M.D.   Subject:RE:Follow up on scar    Dr. HATFIELD,    Can you create a new referral for [facility] so they deny payment to the next appointment with Dr. Jones? I'll start with him on the possible scar tissue and go from there.    Thank you!    Norm      ----- Message -----   From:Leigh Bishop M.D.   Sent:4/2/2020 8:33 AM PDT   To:Jhonatan Fonseca   Subject:RE:Follow up on scar    Yes Dr. Shaw was the one that ordered the CAT scan that is currently  pending.      ----- Message -----   From:Jhonatan Fonseca   Sent:4/2/2020 4:24 AM PDT   To:Leigh Bishop M.D.   Subject:RE:Follow up on scar    Hello,    My apologies, but I'm confused. Would the Urologist be the one to discuss the possible scar tissue build up - if that's what it is - instead of the surgeon's office?    Thanks again!    Norm      ----- Message -----   From:Leigh Bishop M.D.   Sent:4/1/2020 4:44 PM PDT   To:Jhonatan Fonseca   Subject:RE:Follow up on scar    Hi Jhonatan I believe Dr. Shaw is your urologist? We did refill medications on 3/30/2020 please let us know if there are any other refills needed. Dr. LIU Bruce      ----- Message -----   From:Jhonatan Fonseca   Sent:3/31/2020 1:35 PM PDT   To:Hugo Mayes, Med Ass't   Subject:RE:Follow up on scar    Hi PJ,    Who is Dr. Shaw? Also, did you process my RX request?    Thanks!    Norm      ----- Message -----   From:Hugo Mayes, Med Ass't   Sent:3/30/2020 4:54 PM PDT   To:Jhonatan Fonseca   Subject:Follow up on scar    Hello Jhonatan!    For your scar, You will need to evaluate with Dr. Shaw for further assistance. Do you need their phone number?    KELSIE Mayes  Med Ass't

## 2020-05-14 ENCOUNTER — TELEMEDICINE (OUTPATIENT)
Dept: HEALTH INFORMATION MANAGEMENT | Facility: MEDICAL CENTER | Age: 60
End: 2020-05-14
Payer: COMMERCIAL

## 2020-05-14 VITALS
SYSTOLIC BLOOD PRESSURE: 128 MMHG | WEIGHT: 251.4 LBS | BODY MASS INDEX: 35.99 KG/M2 | HEART RATE: 60 BPM | DIASTOLIC BLOOD PRESSURE: 98 MMHG | HEIGHT: 70 IN

## 2020-05-14 DIAGNOSIS — R73.9 HYPERGLYCEMIA: ICD-10-CM

## 2020-05-14 DIAGNOSIS — R53.82 CHRONIC FATIGUE: ICD-10-CM

## 2020-05-14 DIAGNOSIS — E55.9 VITAMIN D DEFICIENCY: ICD-10-CM

## 2020-05-14 DIAGNOSIS — I10 ESSENTIAL HYPERTENSION: ICD-10-CM

## 2020-05-14 DIAGNOSIS — N20.0 CALCULUS OF KIDNEY: ICD-10-CM

## 2020-05-14 DIAGNOSIS — Z76.0 MEDICATION REFILL: ICD-10-CM

## 2020-05-14 DIAGNOSIS — E78.00 PURE HYPERCHOLESTEROLEMIA: ICD-10-CM

## 2020-05-14 DIAGNOSIS — G47.33 OSA (OBSTRUCTIVE SLEEP APNEA): ICD-10-CM

## 2020-05-14 PROCEDURE — 99442 PR PHYSICIAN TELEPHONE EVALUATION 11-20 MIN: CPT | Mod: CR | Performed by: INTERNAL MEDICINE

## 2020-05-14 ASSESSMENT — PATIENT HEALTH QUESTIONNAIRE - PHQ9: CLINICAL INTERPRETATION OF PHQ2 SCORE: 0

## 2020-05-14 ASSESSMENT — FIBROSIS 4 INDEX: FIB4 SCORE: 1.37

## 2020-05-14 NOTE — PROGRESS NOTES
"This encounter was conducted via phone.   Verbal consent was obtained. Patient's identity was verified.  Pt initiated.  Time:  18 min      Bariatric Medicine Follow Up  Chief Complaint   Patient presents with   • Weight Gain       History of Present Illness:   Jhonatan Fonseca is a 59 y.o. male who presents for weight management follow-up and to help address co-morbidities caused by overweight, as below.    During the patient's last visit, the following were discussed and recommended:  Weight Goal: 3-5% wt loss each month (pt goal weight is 225 lb)  Diet: Physical Activity: Swimming 70 minutes 2 times per week, walking 10,000 steps per day  Rx: metformin 500 mg bid  Will try 1000 mg qam, then bid if still snacking more in pm  Side Effects: consent in chart  Behavior change: Focus on his own meals if his wife is not supportive of healthier eating    Pt is struggling to not eat fast food b/c wife wants it.  Trying to eat healthier anyway, saying no more often.  He sends today all of his tracking information, what he has been eating, calories.  He really enjoys tracking and feels that the only thing that has kept him from gaining a lot of weight while he is now unemployed.    He wants to try Contrave but cannot afford.  He feels he needs anti-obesity medication to really help him go beyond his weight loss plateau.    HTN: Continues amlodipine, bisoprolol, with good blood pressure control.  He is checking almost daily.  DK: Stable.  HLD:  [place] 3 FA  IFG: continues daily metformin    Exercise:   Not much     Review of Systems   Pt denies lightheadedness, weakness, worsening fatigue, excessive dry mouth, mood changes, paresthesias.  All other ROS were reviewed and are otherwise unchanged from my previous visit with patient.    Physical Exam:    /98 (BP Location: Left arm, Patient Position: Sitting, BP Cuff Size: Large adult)   Pulse 60   Ht 1.778 m (5' 10\")   Wt 114 kg (251 lb 6.4 oz)   BMI 36.07 kg/m² "   Waist Measurement   Waist: 48 inch/inches   /65  Weight change since last visit:  +2 lb    Constitutional: Oriented to person, place, and time and well-developed, well-nourished, and in no distress.     Psychiatric: Mood, memory, affect and judgment normal.     Laboratory:   Recent labs reviewed.      Dietitian Assessment: I have reviewed the Dietitian's assessment related to this encounter.     ASSESSMENT/PLAN:  Body mass index is 36.07 kg/m².    Obesity Stage ([place]):  2; Class 2    1. BMI 36.0-36.9,adult     2. Pure hypercholesterolemia  Basic Metabolic Panel   3. DK (obstructive sleep apnea)     4. Essential hypertension  Basic Metabolic Panel   5. Chronic fatigue     6. Nephrolithiasis     7. Hyperglycemia  Basic Metabolic Panel    HEMOGLOBIN A1C   8. Vitamin D deficiency     9. Medication refill  metFORMIN (GLUCOPHAGE) 500 MG Tab     Some weight gain while now unemployed.  He feels he needs to learn how to say no to eating out, trying to make healthier choices and continue tracking.  The tracking and more mindful eating is keeping him on course.  He badly wants to resume higher activity level, is working on that.  Continue to monitor lipids, sleep, blood pressure.  Cannot take Topamax due to nephrolithiasis, but will try increasing metformin.  Consider Contrave once this is affordable to him.    The patient and I have discussed at length and agree to the following recommendations, which are all addressing the above diagnoses:    Weight Goal: 3-5% wt loss each month (pt goal weight is 225 lb)  Diet: Makes his own protein shake with almond milk or PP every morning  Continue tracking with my fitness pal, bring in printout again next visit  Reduce total kcal to 1700 kcals per day, 100 g CHO per day  Fit crunch bar okay to use after gym workout midday  Watch after dinner snacks  Avoid excess meals out  Physical Activity:  Resume swimming in 1 mo  Back to gym this month  Rx: Pt wants to consider Contrave  but financial constraints  No topiramate d/t nephrolithiasis  Suggest maintaining higher metformin dose pending labs as ordered today  Side Effects: consent sent to pt  Behavior change: focus on saying no to eating out  Follow-up: 4-6 wks    Patient's body mass index is 36.07 kg/m². Exercise and nutrition counseling were performed at this visit.

## 2020-05-21 ENCOUNTER — HOSPITAL ENCOUNTER (OUTPATIENT)
Dept: LAB | Facility: MEDICAL CENTER | Age: 60
End: 2020-05-21
Attending: INTERNAL MEDICINE
Payer: COMMERCIAL

## 2020-05-21 DIAGNOSIS — I10 ESSENTIAL HYPERTENSION: ICD-10-CM

## 2020-05-21 DIAGNOSIS — E55.9 VITAMIN D DEFICIENCY: ICD-10-CM

## 2020-05-21 DIAGNOSIS — R73.9 HYPERGLYCEMIA: ICD-10-CM

## 2020-05-21 DIAGNOSIS — E78.00 PURE HYPERCHOLESTEROLEMIA: ICD-10-CM

## 2020-05-21 LAB
25(OH)D3 SERPL-MCNC: 42 NG/ML (ref 30–100)
ANION GAP SERPL CALC-SCNC: 7 MMOL/L (ref 7–16)
BUN SERPL-MCNC: 15 MG/DL (ref 8–22)
CALCIUM SERPL-MCNC: 10.2 MG/DL (ref 8.5–10.5)
CHLORIDE SERPL-SCNC: 101 MMOL/L (ref 96–112)
CO2 SERPL-SCNC: 26 MMOL/L (ref 20–33)
CREAT SERPL-MCNC: 0.6 MG/DL (ref 0.5–1.4)
EST. AVERAGE GLUCOSE BLD GHB EST-MCNC: 120 MG/DL
FASTING STATUS PATIENT QL REPORTED: NORMAL
GLUCOSE SERPL-MCNC: 98 MG/DL (ref 65–99)
HBA1C MFR BLD: 5.8 % (ref 0–5.6)
POTASSIUM SERPL-SCNC: 3.8 MMOL/L (ref 3.6–5.5)
SODIUM SERPL-SCNC: 134 MMOL/L (ref 135–145)

## 2020-05-21 PROCEDURE — 83036 HEMOGLOBIN GLYCOSYLATED A1C: CPT

## 2020-05-21 PROCEDURE — 80048 BASIC METABOLIC PNL TOTAL CA: CPT

## 2020-05-21 PROCEDURE — 82306 VITAMIN D 25 HYDROXY: CPT

## 2020-05-21 PROCEDURE — 36415 COLL VENOUS BLD VENIPUNCTURE: CPT

## 2020-05-26 DIAGNOSIS — Z76.0 MEDICATION REFILL: ICD-10-CM

## 2020-06-02 DIAGNOSIS — Z76.0 MEDICATION REFILL: ICD-10-CM

## 2020-06-09 DIAGNOSIS — Z76.0 MEDICATION REFILL: ICD-10-CM

## 2020-06-09 NOTE — PROGRESS NOTES
Received prescription refill (papercopy) of bisoprolol 5 mg from [facility] pharmacy.   Covering for Dr Bishop. Reviewed patient's chart. Signed prescription refill and we will fax papers to pharmacy.

## 2020-06-10 DIAGNOSIS — J30.2 SEASONAL ALLERGIC RHINITIS, UNSPECIFIED TRIGGER: ICD-10-CM

## 2020-06-10 RX ORDER — FLUTICASONE PROPIONATE 50 MCG
1 SPRAY, SUSPENSION (ML) NASAL
Qty: 3 BOTTLE | Refills: 3 | Status: SHIPPED | OUTPATIENT
Start: 2020-06-10 | End: 2021-04-28

## 2020-06-12 ENCOUNTER — PATIENT MESSAGE (OUTPATIENT)
Dept: MEDICAL GROUP | Facility: MEDICAL CENTER | Age: 60
End: 2020-06-12

## 2020-06-12 DIAGNOSIS — R19.05 PERIUMBILICAL MASS: ICD-10-CM

## 2020-07-01 ENCOUNTER — OFFICE VISIT (OUTPATIENT)
Dept: HEALTH INFORMATION MANAGEMENT | Facility: MEDICAL CENTER | Age: 60
End: 2020-07-01
Payer: COMMERCIAL

## 2020-07-01 ENCOUNTER — HOSPITAL ENCOUNTER (OUTPATIENT)
Dept: LAB | Facility: MEDICAL CENTER | Age: 60
End: 2020-07-01
Attending: INTERNAL MEDICINE
Payer: COMMERCIAL

## 2020-07-01 VITALS
OXYGEN SATURATION: 94 % | HEART RATE: 74 BPM | HEIGHT: 70 IN | SYSTOLIC BLOOD PRESSURE: 120 MMHG | DIASTOLIC BLOOD PRESSURE: 78 MMHG | WEIGHT: 255.1 LBS | BODY MASS INDEX: 36.52 KG/M2

## 2020-07-01 DIAGNOSIS — I10 ESSENTIAL HYPERTENSION: ICD-10-CM

## 2020-07-01 DIAGNOSIS — R53.83 LOW ENERGY: ICD-10-CM

## 2020-07-01 DIAGNOSIS — E55.9 VITAMIN D DEFICIENCY: ICD-10-CM

## 2020-07-01 DIAGNOSIS — R79.89 ELEVATED TSH: ICD-10-CM

## 2020-07-01 DIAGNOSIS — E78.00 PURE HYPERCHOLESTEROLEMIA: ICD-10-CM

## 2020-07-01 DIAGNOSIS — G47.33 OSA (OBSTRUCTIVE SLEEP APNEA): ICD-10-CM

## 2020-07-01 DIAGNOSIS — R73.9 HYPERGLYCEMIA: ICD-10-CM

## 2020-07-01 LAB — TSH SERPL DL<=0.005 MIU/L-ACNC: 3.89 UIU/ML (ref 0.38–5.33)

## 2020-07-01 PROCEDURE — 84443 ASSAY THYROID STIM HORMONE: CPT

## 2020-07-01 PROCEDURE — 84402 ASSAY OF FREE TESTOSTERONE: CPT

## 2020-07-01 PROCEDURE — 84403 ASSAY OF TOTAL TESTOSTERONE: CPT

## 2020-07-01 PROCEDURE — 36415 COLL VENOUS BLD VENIPUNCTURE: CPT

## 2020-07-01 PROCEDURE — 99213 OFFICE O/P EST LOW 20 MIN: CPT | Performed by: INTERNAL MEDICINE

## 2020-07-01 RX ORDER — TAMSULOSIN HYDROCHLORIDE 0.4 MG/1
0.4 CAPSULE ORAL EVERY EVENING
COMMUNITY

## 2020-07-01 ASSESSMENT — PATIENT HEALTH QUESTIONNAIRE - PHQ9: CLINICAL INTERPRETATION OF PHQ2 SCORE: 0

## 2020-07-01 ASSESSMENT — FIBROSIS 4 INDEX: FIB4 SCORE: 1.39

## 2020-07-01 NOTE — PROGRESS NOTES
"Bariatric Medicine Follow Up  Chief Complaint   Patient presents with   • Weight Gain       History of Present Illness:   Jhonatan Fonseca is a 60 y.o. male who presents for weight management follow-up and to help address co-morbidities caused by overweight, as below.    During the patient's last visit, the following were discussed and recommended:  Weight Goal: 3-5% wt loss each month (pt goal weight is 225 lb)  Diet: Makes his own protein shake with almond milk or PP every morning  Continue tracking with my fitness pal, bring in printout again next visit  Reduce total kcal to 1700 kcals per day, 100 g CHO per day  Fit crunch bar okay to use after gym workout midday  Watch after dinner snacks  Avoid excess meals out  Physical Activity:  Resume swimming in 1 mo  Back to gym this month  Rx: Pt wants to consider Contrave but financial constraints  No topiramate d/t nephrolithiasis  Suggest maintaining higher metformin dose pending labs as ordered today  Side Effects: consent sent to pt  Behavior change: focus on saying no to eating out    Low energy level.  He feels very fatigued, thinks it has affected his ability to lose weight.  Since he has lost his job, less motivation to make more change.    Not eating regular meals.  Wife also eating irregularly.  Cannot afford anti-obesity medication at this time so defers.    He continues to track his intake, carbohydrate intake up.  Averages between 150-185 g carbohydrates per day.    IFG: Continues metformin  DK:  Sleep stable    Exercise:   Gym closed  None     Review of Systems   Pt denies lightheadedness, weakness, worsening fatigue, excessive dry mouth, mood changes, paresthesias.  All other ROS were reviewed and are otherwise unchanged from my previous visit with patient.    Physical Exam:    /78 (BP Location: Left arm, Patient Position: Sitting, BP Cuff Size: Large adult)   Pulse 74   Ht 1.778 m (5' 10\")   Wt 115.7 kg (255 lb 1.6 oz)   SpO2 94%   BMI " 36.60 kg/m²   Waist Measurement   Waist: 49 inch/inches  Weight change since last visit: +4 lb (-25 lb total)  Waist Circum change since last visit:  +2 in (-4.5 in total)    Constitutional: Oriented to person, place, and time and well-developed, well-nourished, and in no distress.    Head: Normocephalic.   Musculoskeletal: Normal range of motion. No edema.   Neurological: Alert and oriented to person, place, and time. No muscle weakness.  Gait normal.   Skin: Warm and dry. Not diaphoretic.   Psychiatric: Mood, memory, affect and judgment normal.     Laboratory:   Recent labs reviewed.      Dietitian Assessment: I have reviewed the Dietitian's assessment related to this encounter.     ASSESSMENT/PLAN:  Body mass index is 36.6 kg/m².    Obesity Stage ([place]):  2; Class 2    1. BMI 36.0-36.9,adult     2. DK (obstructive sleep apnea)     3. Essential hypertension     4. Pure hypercholesterolemia     5. Hyperglycemia     6. Vitamin D deficiency     7. Low energy  TSH WITH REFLEX TO FT4    TESTOSTERONE, FREE/TOT EQUILIB   8. Elevated TSH  TSH WITH REFLEX TO FT4     The patient is struggling with further weight loss.  Would benefit from different anti-obesity medication but defers due to financial constraints.  He needs to focus on reducing refined carbohydrates and increasing his activity level which helped him the most with weight loss past year.  Continue to monitor sleep, blood pressure, lipids, fasting glucose and vitamin D.  Check thyroid function and testosterone given low energy level.    The patient and I have discussed at length and agree to the following recommendations, which are all addressing the above diagnoses:    Weight Goal: 3-5% wt loss each month (pt goal weight is 220 lb)  Diet: Resume reducing refined CHO intake further, back towards 100 g/day as previously discussed  Tracking his intake daily.  Planning meals again, back to structure  Physical Activity:  Childcare  Hoping to get back to gym  Rx:  Continue metformin  Financial constraints prohibit use of other anti-obesity medication at this time  Side Effects: consent in chart  Behavior change: more environmental management, reducing refined carbs  Follow-up: 6 wks    Patient's body mass index is 36.6 kg/m². Exercise and nutrition counseling were performed at this visit.

## 2020-07-06 LAB — MISCELLANEOUS LAB RESULT MISCLAB: NORMAL

## 2020-07-16 ENCOUNTER — PATIENT MESSAGE (OUTPATIENT)
Dept: MEDICAL GROUP | Facility: MEDICAL CENTER | Age: 60
End: 2020-07-16

## 2020-07-16 DIAGNOSIS — N20.0 URIC ACID KIDNEY STONE: ICD-10-CM

## 2020-07-16 RX ORDER — ALLOPURINOL 300 MG/1
300 TABLET ORAL DAILY
Qty: 90 TAB | Refills: 1 | Status: SHIPPED | OUTPATIENT
Start: 2020-07-16 | End: 2020-12-09 | Stop reason: SDUPTHER

## 2020-07-30 ENCOUNTER — PATIENT MESSAGE (OUTPATIENT)
Dept: MEDICAL GROUP | Facility: MEDICAL CENTER | Age: 60
End: 2020-07-30

## 2020-07-30 DIAGNOSIS — Z76.0 MEDICATION REFILL: ICD-10-CM

## 2020-07-30 RX ORDER — ATORVASTATIN CALCIUM 20 MG/1
20 TABLET, FILM COATED ORAL
Qty: 90 TAB | Refills: 1 | Status: SHIPPED | OUTPATIENT
Start: 2020-07-30 | End: 2020-12-22 | Stop reason: SDUPTHER

## 2020-07-30 NOTE — TELEPHONE ENCOUNTER
From: Jhonatan Fonseca  To: Leigh Bishop M.D.  Sent: 7/30/2020 6:19 AM PDT  Subject: Prescription Question    Alfredo ELKINS,    Can you next send to [facility] my RX for Atorvasatin Calcium?    Thanks!    Norm      ----- Message -----   From:Leigh Bishop M.D.   Sent:7/16/2020 11:18 AM PDT   To:Jhonatan Fonseca   Subject:RE: Prescription Question    Hi Norm the medication has been sent to [facility]. Dr. LIU Bruce      ----- Message -----   From:Jhonatan Fonseca   Sent:7/16/2020 9:43 AM PDT   To:Leigh Bishop M.D.   Subject:Prescription Question    Dr. ELKINS    Please request a refill of Allopurinol 300mg from [facility] mailorder pharmacy. This will be the first time requesting this med from them.    Thanks!    Norm

## 2020-08-14 DIAGNOSIS — Z01.812 PRE-OPERATIVE LABORATORY EXAMINATION: ICD-10-CM

## 2020-08-14 DIAGNOSIS — Z01.810 PRE-OPERATIVE CARDIOVASCULAR EXAMINATION: ICD-10-CM

## 2020-08-14 LAB
ANION GAP SERPL CALC-SCNC: 12 MMOL/L (ref 7–16)
BUN SERPL-MCNC: 16 MG/DL (ref 8–22)
CALCIUM SERPL-MCNC: 10.2 MG/DL (ref 8.5–10.5)
CHLORIDE SERPL-SCNC: 104 MMOL/L (ref 96–112)
CO2 SERPL-SCNC: 26 MMOL/L (ref 20–33)
COVID ORDER STATUS COVID19: NORMAL
CREAT SERPL-MCNC: 0.73 MG/DL (ref 0.5–1.4)
EKG IMPRESSION: NORMAL
GLUCOSE SERPL-MCNC: 102 MG/DL (ref 65–99)
POTASSIUM SERPL-SCNC: 4.5 MMOL/L (ref 3.6–5.5)
SARS-COV-2 RNA RESP QL NAA+PROBE: NOTDETECTED
SODIUM SERPL-SCNC: 142 MMOL/L (ref 135–145)
SPECIMEN SOURCE: NORMAL

## 2020-08-14 PROCEDURE — 93010 ELECTROCARDIOGRAM REPORT: CPT | Performed by: INTERNAL MEDICINE

## 2020-08-14 PROCEDURE — 36415 COLL VENOUS BLD VENIPUNCTURE: CPT

## 2020-08-14 PROCEDURE — 80048 BASIC METABOLIC PNL TOTAL CA: CPT

## 2020-08-14 PROCEDURE — 93005 ELECTROCARDIOGRAM TRACING: CPT

## 2020-08-14 PROCEDURE — U0003 INFECTIOUS AGENT DETECTION BY NUCLEIC ACID (DNA OR RNA); SEVERE ACUTE RESPIRATORY SYNDROME CORONAVIRUS 2 (SARS-COV-2) (CORONAVIRUS DISEASE [COVID-19]), AMPLIFIED PROBE TECHNIQUE, MAKING USE OF HIGH THROUGHPUT TECHNOLOGIES AS DESCRIBED BY CMS-2020-01-R: HCPCS

## 2020-08-14 SDOH — HEALTH STABILITY: MENTAL HEALTH: HOW OFTEN DO YOU HAVE A DRINK CONTAINING ALCOHOL?: MONTHLY OR LESS

## 2020-08-14 ASSESSMENT — FIBROSIS 4 INDEX: FIB4 SCORE: 1.39

## 2020-08-19 ENCOUNTER — ANESTHESIA EVENT (OUTPATIENT)
Dept: SURGERY | Facility: MEDICAL CENTER | Age: 60
End: 2020-08-19
Payer: COMMERCIAL

## 2020-08-19 ENCOUNTER — HOSPITAL ENCOUNTER (OUTPATIENT)
Facility: MEDICAL CENTER | Age: 60
End: 2020-08-19
Attending: SURGERY | Admitting: SURGERY
Payer: COMMERCIAL

## 2020-08-19 ENCOUNTER — ANESTHESIA (OUTPATIENT)
Dept: SURGERY | Facility: MEDICAL CENTER | Age: 60
End: 2020-08-19
Payer: COMMERCIAL

## 2020-08-19 VITALS
TEMPERATURE: 97.4 F | RESPIRATION RATE: 16 BRPM | WEIGHT: 255.73 LBS | BODY MASS INDEX: 35.8 KG/M2 | OXYGEN SATURATION: 94 % | HEIGHT: 71 IN | DIASTOLIC BLOOD PRESSURE: 68 MMHG | SYSTOLIC BLOOD PRESSURE: 106 MMHG | HEART RATE: 79 BPM

## 2020-08-19 DIAGNOSIS — G89.18 POST-OP PAIN: ICD-10-CM

## 2020-08-19 LAB — GLUCOSE BLD-MCNC: 86 MG/DL (ref 65–99)

## 2020-08-19 PROCEDURE — A9270 NON-COVERED ITEM OR SERVICE: HCPCS | Performed by: SURGERY

## 2020-08-19 PROCEDURE — 160035 HCHG PACU - 1ST 60 MINS PHASE I: Performed by: SURGERY

## 2020-08-19 PROCEDURE — 82962 GLUCOSE BLOOD TEST: CPT

## 2020-08-19 PROCEDURE — 501445 HCHG STAPLER, SKIN DISP: Performed by: SURGERY

## 2020-08-19 PROCEDURE — 700101 HCHG RX REV CODE 250: Performed by: SURGERY

## 2020-08-19 PROCEDURE — 501838 HCHG SUTURE GENERAL: Performed by: SURGERY

## 2020-08-19 PROCEDURE — 700102 HCHG RX REV CODE 250 W/ 637 OVERRIDE(OP): Performed by: SURGERY

## 2020-08-19 PROCEDURE — 160046 HCHG PACU - 1ST 60 MINS PHASE II: Performed by: SURGERY

## 2020-08-19 PROCEDURE — 160028 HCHG SURGERY MINUTES - 1ST 30 MINS LEVEL 3: Performed by: SURGERY

## 2020-08-19 PROCEDURE — 160048 HCHG OR STATISTICAL LEVEL 1-5: Performed by: SURGERY

## 2020-08-19 PROCEDURE — 160036 HCHG PACU - EA ADDL 30 MINS PHASE I: Performed by: SURGERY

## 2020-08-19 PROCEDURE — 700111 HCHG RX REV CODE 636 W/ 250 OVERRIDE (IP): Performed by: ANESTHESIOLOGY

## 2020-08-19 PROCEDURE — 160039 HCHG SURGERY MINUTES - EA ADDL 1 MIN LEVEL 3: Performed by: SURGERY

## 2020-08-19 PROCEDURE — 700105 HCHG RX REV CODE 258: Performed by: SURGERY

## 2020-08-19 PROCEDURE — 160025 RECOVERY II MINUTES (STATS): Performed by: SURGERY

## 2020-08-19 PROCEDURE — 700102 HCHG RX REV CODE 250 W/ 637 OVERRIDE(OP): Performed by: ANESTHESIOLOGY

## 2020-08-19 PROCEDURE — 700101 HCHG RX REV CODE 250: Performed by: ANESTHESIOLOGY

## 2020-08-19 PROCEDURE — 160002 HCHG RECOVERY MINUTES (STAT): Performed by: SURGERY

## 2020-08-19 PROCEDURE — C1781 MESH (IMPLANTABLE): HCPCS | Performed by: SURGERY

## 2020-08-19 PROCEDURE — A9270 NON-COVERED ITEM OR SERVICE: HCPCS | Performed by: ANESTHESIOLOGY

## 2020-08-19 PROCEDURE — 160009 HCHG ANES TIME/MIN: Performed by: SURGERY

## 2020-08-19 DEVICE — MESH VENTRALIGHT 4 X 6 INCH - (1EA/CA): Type: IMPLANTABLE DEVICE | Site: ABDOMEN | Status: FUNCTIONAL

## 2020-08-19 RX ORDER — PHENYLEPHRINE HYDROCHLORIDE 10 MG/ML
INJECTION, SOLUTION INTRAMUSCULAR; INTRAVENOUS; SUBCUTANEOUS PRN
Status: DISCONTINUED | OUTPATIENT
Start: 2020-08-19 | End: 2020-08-19 | Stop reason: SURG

## 2020-08-19 RX ORDER — HALOPERIDOL 5 MG/ML
1 INJECTION INTRAMUSCULAR
Status: DISCONTINUED | OUTPATIENT
Start: 2020-08-19 | End: 2020-08-19 | Stop reason: HOSPADM

## 2020-08-19 RX ORDER — KETOROLAC TROMETHAMINE 30 MG/ML
INJECTION, SOLUTION INTRAMUSCULAR; INTRAVENOUS PRN
Status: DISCONTINUED | OUTPATIENT
Start: 2020-08-19 | End: 2020-08-19 | Stop reason: SURG

## 2020-08-19 RX ORDER — MEPERIDINE HYDROCHLORIDE 25 MG/ML
12.5 INJECTION INTRAMUSCULAR; INTRAVENOUS; SUBCUTANEOUS
Status: DISCONTINUED | OUTPATIENT
Start: 2020-08-19 | End: 2020-08-19 | Stop reason: HOSPADM

## 2020-08-19 RX ORDER — HYDROCODONE BITARTRATE AND ACETAMINOPHEN 5; 325 MG/1; MG/1
1-2 TABLET ORAL EVERY 4 HOURS PRN
Qty: 20 TAB | Refills: 0 | Status: SHIPPED | OUTPATIENT
Start: 2020-08-19 | End: 2020-08-21

## 2020-08-19 RX ORDER — OXYCODONE HCL 5 MG/5 ML
10 SOLUTION, ORAL ORAL
Status: COMPLETED | OUTPATIENT
Start: 2020-08-19 | End: 2020-08-19

## 2020-08-19 RX ORDER — ONDANSETRON 2 MG/ML
INJECTION INTRAMUSCULAR; INTRAVENOUS PRN
Status: DISCONTINUED | OUTPATIENT
Start: 2020-08-19 | End: 2020-08-19 | Stop reason: SURG

## 2020-08-19 RX ORDER — CEFAZOLIN SODIUM 1 G/3ML
INJECTION, POWDER, FOR SOLUTION INTRAMUSCULAR; INTRAVENOUS PRN
Status: DISCONTINUED | OUTPATIENT
Start: 2020-08-19 | End: 2020-08-19 | Stop reason: SURG

## 2020-08-19 RX ORDER — BUPIVACAINE HYDROCHLORIDE AND EPINEPHRINE 5; 5 MG/ML; UG/ML
INJECTION, SOLUTION EPIDURAL; INTRACAUDAL; PERINEURAL
Status: DISCONTINUED | OUTPATIENT
Start: 2020-08-19 | End: 2020-08-19 | Stop reason: HOSPADM

## 2020-08-19 RX ORDER — HYDROMORPHONE HYDROCHLORIDE 1 MG/ML
0.1 INJECTION, SOLUTION INTRAMUSCULAR; INTRAVENOUS; SUBCUTANEOUS
Status: DISCONTINUED | OUTPATIENT
Start: 2020-08-19 | End: 2020-08-19 | Stop reason: HOSPADM

## 2020-08-19 RX ORDER — ONDANSETRON 2 MG/ML
4 INJECTION INTRAMUSCULAR; INTRAVENOUS
Status: DISCONTINUED | OUTPATIENT
Start: 2020-08-19 | End: 2020-08-19 | Stop reason: HOSPADM

## 2020-08-19 RX ORDER — OXYCODONE HCL 5 MG/5 ML
5 SOLUTION, ORAL ORAL
Status: COMPLETED | OUTPATIENT
Start: 2020-08-19 | End: 2020-08-19

## 2020-08-19 RX ORDER — SODIUM CHLORIDE, SODIUM LACTATE, POTASSIUM CHLORIDE, CALCIUM CHLORIDE 600; 310; 30; 20 MG/100ML; MG/100ML; MG/100ML; MG/100ML
INJECTION, SOLUTION INTRAVENOUS CONTINUOUS
Status: DISCONTINUED | OUTPATIENT
Start: 2020-08-19 | End: 2020-08-19 | Stop reason: HOSPADM

## 2020-08-19 RX ORDER — HYDROMORPHONE HYDROCHLORIDE 1 MG/ML
0.4 INJECTION, SOLUTION INTRAMUSCULAR; INTRAVENOUS; SUBCUTANEOUS
Status: DISCONTINUED | OUTPATIENT
Start: 2020-08-19 | End: 2020-08-19 | Stop reason: HOSPADM

## 2020-08-19 RX ORDER — DEXAMETHASONE SODIUM PHOSPHATE 4 MG/ML
INJECTION, SOLUTION INTRA-ARTICULAR; INTRALESIONAL; INTRAMUSCULAR; INTRAVENOUS; SOFT TISSUE PRN
Status: DISCONTINUED | OUTPATIENT
Start: 2020-08-19 | End: 2020-08-19 | Stop reason: SURG

## 2020-08-19 RX ORDER — DIPHENHYDRAMINE HYDROCHLORIDE 50 MG/ML
12.5 INJECTION INTRAMUSCULAR; INTRAVENOUS
Status: DISCONTINUED | OUTPATIENT
Start: 2020-08-19 | End: 2020-08-19 | Stop reason: HOSPADM

## 2020-08-19 RX ORDER — HYDROMORPHONE HYDROCHLORIDE 1 MG/ML
0.2 INJECTION, SOLUTION INTRAMUSCULAR; INTRAVENOUS; SUBCUTANEOUS
Status: DISCONTINUED | OUTPATIENT
Start: 2020-08-19 | End: 2020-08-19 | Stop reason: HOSPADM

## 2020-08-19 RX ADMIN — KETOROLAC TROMETHAMINE 15 MG: 30 INJECTION, SOLUTION INTRAMUSCULAR at 12:07

## 2020-08-19 RX ADMIN — ROCURONIUM BROMIDE 50 MG: 10 INJECTION, SOLUTION INTRAVENOUS at 11:36

## 2020-08-19 RX ADMIN — PROPOFOL 200 MG: 10 INJECTION, EMULSION INTRAVENOUS at 11:45

## 2020-08-19 RX ADMIN — OXYCODONE HYDROCHLORIDE 10 MG: 5 SOLUTION ORAL at 12:21

## 2020-08-19 RX ADMIN — ONDANSETRON 4 MG: 2 INJECTION INTRAMUSCULAR; INTRAVENOUS at 11:56

## 2020-08-19 RX ADMIN — SUGAMMADEX 400 MG: 100 INJECTION, SOLUTION INTRAVENOUS at 12:07

## 2020-08-19 RX ADMIN — SODIUM CHLORIDE, POTASSIUM CHLORIDE, SODIUM LACTATE AND CALCIUM CHLORIDE: 600; 310; 30; 20 INJECTION, SOLUTION INTRAVENOUS at 10:13

## 2020-08-19 RX ADMIN — FENTANYL CITRATE 50 MCG: 50 INJECTION INTRAMUSCULAR; INTRAVENOUS at 11:52

## 2020-08-19 RX ADMIN — DEXAMETHASONE SODIUM PHOSPHATE 4 MG: 4 INJECTION, SOLUTION INTRA-ARTICULAR; INTRALESIONAL; INTRAMUSCULAR; INTRAVENOUS; SOFT TISSUE at 11:56

## 2020-08-19 RX ADMIN — PHENYLEPHRINE HYDROCHLORIDE 100 MCG: 10 INJECTION INTRAVENOUS at 12:04

## 2020-08-19 RX ADMIN — CEFAZOLIN 2 G: 330 INJECTION, POWDER, FOR SOLUTION INTRAMUSCULAR; INTRAVENOUS at 11:36

## 2020-08-19 RX ADMIN — FENTANYL CITRATE 25 MCG: 50 INJECTION INTRAMUSCULAR; INTRAVENOUS at 12:25

## 2020-08-19 RX ADMIN — POVIDONE-IODINE 15 ML: 10 SOLUTION TOPICAL at 09:00

## 2020-08-19 RX ADMIN — PROPOFOL 200 MG: 10 INJECTION, EMULSION INTRAVENOUS at 11:35

## 2020-08-19 RX ADMIN — FENTANYL CITRATE 50 MCG: 50 INJECTION INTRAMUSCULAR; INTRAVENOUS at 11:35

## 2020-08-19 ASSESSMENT — PAIN SCALES - GENERAL: PAIN_LEVEL: 1

## 2020-08-19 ASSESSMENT — FIBROSIS 4 INDEX: FIB4 SCORE: 1.39

## 2020-08-19 NOTE — ANESTHESIA QCDR
2019 [facility] Clinical Data Registry (for Quality Improvement)     Postoperative nausea/vomiting risk protocol (Adult = 18 yrs and Pediatric 3-17 yrs)- (430 and 463)  General inhalation anesthetic (NOT TIVA) with PONV risk factors: Yes  Provision of anti-emetic therapy with at least 2 different classes of agents: Yes   Patient DID NOT receive anti-emetic therapy and reason is documented in Medical Record:  N/A    Multimodal Pain Management- (477)  Non-emergent surgery AND patient age >= 18:   Use of Multimodal Pain Management, two or more drugs and/or interventions, NOT including systemic opioids:   Exception: Documented allergy to multiple classes of analgesics:     Smoking Abstinence (404)  Patient is current smoker (cigarette, pipe, e-cig, marijuanna):   Elective Surgery:   Abstinence instructions provided prior to day of surgery:   Patient abstained from smoking on day of surgery:     Pre-Op Beta-Blocker in Isolated CABG (44)  Isolated CABG AND patient age >= 18:   Beta-blocker admin within 24 hours of surgical incision:   Exception:of medical reason(s) for not administering beta blocker within 24 hours prior to surgical incision (e.g., not  indicated,other medical reason):     PACU assessment of acute postoperative pain prior to Anesthesia Care End- Applies to Patients Age = 18- (ABG7)  Initial PACU pain score is which of the following: < 7/10  Patient unable to report pain score: N/A    Post-anesthetic transfer of care checklist/protocol to PACU/ICU- (426 and 427)  Upon conclusion of case, patient transferred to which of the following locations: PACU/Non-ICU  Use of transfer checklist/protocol: Yes  Exclusion: Service Performed in Patient Hospital Room (and thus did not require transfer): N/A  Unplanned admission to ICU related to anesthesia service up through end of PACU care- (MD51)  Unplanned admission to ICU (not initially anticipated at anesthesia start time): No

## 2020-08-19 NOTE — H&P
"Surgery General History & Physical Note    Date  8/19/2020    Primary Care Physician  Leigh Bishop M.D.    CC  Incisional hernia    HPI  This is a 60 y.o. male who presents today for elective repair of his incisional hernia.  Patient has an incisional hernia in the midportion of his abdomen.    Past Medical History:   Diagnosis Date   • Anesthesia 08/13/2019    \"I was told I turned green on table during foot surgery about 20 years ago.\"   • Arthritis 08/13/2019    Left Knee, Right Hand    • Breath shortness 08/13/2019    Pt. states overweight & out of shape.   • Celiac artery aneurysm (HCC)    • Chickenpox    • Diabetes (HCC) 08/14/2020    Takes Metformin to control A1C but not actually diagnosed   • Dyslipidemia    • High cholesterol    • Hypertension    • Kidney stone    • Sleep apnea     CPAP USE   • Snoring     DX DK       Past Surgical History:   Procedure Laterality Date   • AORTOFEMORAL BYPASS N/A 8/15/2019    Procedure: CREATION, bypass arterial, celiac artery aneurism repair;  Surgeon: Elie Morales M.D.;  Location: SURGERY [facility];  Service: General   • BONE SPUR EXCISION     • KNEE ARTHROSCOPY      repair micscus tear   • LITHOTRIPSY     • OTHER      Surgery for right shoulder dislocation and torn ligaments 1974.   • OTHER ORTHOPEDIC SURGERY      Left Foot Surgery x3 from broken foot in high school.   • OTHER ORTHOPEDIC SURGERY      Right Achilles Surgery       Current Facility-Administered Medications   Medication Dose Route Frequency Provider Last Rate Last Dose   • lidocaine (XYLOCAINE) 1 % injection 0.5 mL  0.5 mL Intradermal Once PRN Elie Morales M.D.       • lactated ringers infusion   Intravenous Continuous Elie Morales M.D. 10 mL/hr at 08/19/20 1013         Social History     Socioeconomic History   • Marital status:      Spouse name: Not on file   • Number of children: Not on file   • Years of education: Not on file   • Highest education level: Not on file   Occupational " History   • Not on file   Social Needs   • Financial resource strain: Not on file   • Food insecurity     Worry: Not on file     Inability: Not on file   • Transportation needs     Medical: Not on file     Non-medical: Not on file   Tobacco Use   • Smoking status: Never Smoker   • Smokeless tobacco: Never Used   Substance and Sexual Activity   • Alcohol use: Yes     Alcohol/week: 0.0 oz     Frequency: Monthly or less     Comment: 2/month   • Drug use: No   • Sexual activity: Yes     Partners: Female   Lifestyle   • Physical activity     Days per week: Not on file     Minutes per session: Not on file   • Stress: Not on file   Relationships   • Social connections     Talks on phone: Not on file     Gets together: Not on file     Attends Mu-ism service: Not on file     Active member of club or organization: Not on file     Attends meetings of clubs or organizations: Not on file     Relationship status: Not on file   • Intimate partner violence     Fear of current or ex partner: Not on file     Emotionally abused: Not on file     Physically abused: Not on file     Forced sexual activity: Not on file   Other Topics Concern   • Not on file   Social History Narrative   • Not on file       Family History   Problem Relation Age of Onset   • Cancer Brother         parathyroid   • Sleep Apnea Brother    • Diabetes Brother    • Sleep Apnea Brother    • Diabetes Father        Allergies  Lisinopril    Review of Systems  Negative except for Vague abdominal discomfort in the region of the hernia    Physical Exam  Heart regular rate and rhythm  Lungs clear to auscultation  Abdomen soft  Palpable reducible incisional hernia  Extremities unremarkable  Neurologically intact    Vital Signs  Blood Pressure: 104/76   Temperature: 36.2 °C (97.2 °F)   Pulse: 64   Respiration: 18   Pulse Oximetry: 94 %       Labs:                    Radiology:  No orders to display         Assessment/Plan:  Incisional hernia    Procedure(s):  REPAIR,  HERNIA, INCISIONAL - W/MESH

## 2020-08-19 NOTE — ANESTHESIA PREPROCEDURE EVALUATION
Relevant Problems   ANESTHESIA   (+) DK (obstructive sleep apnea)      CARDIAC   (+) Essential hypertension         (+) Nephrolithiasis       Physical Exam    Airway   Mallampati: II  TM distance: >3 FB  Neck ROM: full       Cardiovascular - normal exam  Rhythm: regular  Rate: normal  (-) murmur     Dental - normal exam           Pulmonary - normal exam  Breath sounds clear to auscultation     Abdominal    Neurological - normal exam                 Anesthesia Plan    ASA 3   ASA physical status 3 criteria: respiratory insufficiency or compromise    Plan - general       Airway plan will be ETT        Induction: intravenous    Postoperative Plan: Postoperative administration of opioids is intended.    Pertinent diagnostic labs and testing reviewed    Informed Consent:    Anesthetic plan and risks discussed with patient.    Use of blood products discussed with: patient whom consented to blood products.

## 2020-08-19 NOTE — OR NURSING
VSS, pt steady with ambulation, meets discharge criteria. Discharged home with wife. Wheeled to car with hospital escort. Rx given to pt as written by physician. Mirta CDI. IV dc'd, cathlon intact. Pt to f/u with physician as directed by physician. Pt to return to ER for any emergent sx. Pt verbalizes understanding of discharge instructions and all questions were answered.

## 2020-08-19 NOTE — ANESTHESIA PROCEDURE NOTES
Airway    Date/Time: 8/19/2020 11:36 AM  Performed by: Noah Hines M.D.  Authorized by: Noah Hines M.D.     Location:  OR  Urgency:  Elective  Indications for Airway Management:  Anesthesia      Spontaneous Ventilation: absent    Sedation Level:  Deep  Preoxygenated: Yes    Patient Position:  Sniffing  Final Airway Type:  Endotracheal airway  Final Endotracheal Airway:  ETT  Cuffed: Yes    Technique Used for Successful ETT Placement:  Direct laryngoscopy    Insertion Site:  Oral  Blade Type:  Butts  Laryngoscope Blade/Videolaryngoscope Blade Size:  2  ETT Size (mm):  7.5  Measured from:  Teeth  ETT to Teeth (cm):  23  Placement Verified by: auscultation and capnometry    Cormack-Lehane Classification:  Grade IIa - partial view of glottis  Number of Attempts at Approach:  1

## 2020-08-19 NOTE — ANESTHESIA POSTPROCEDURE EVALUATION
Patient: Jhonatan Fonseca    Procedure Summary     Date: 08/19/20 Room / Location: [facility] 08 / SURGERY [facility]    Anesthesia Start: 1126 Anesthesia Stop: 1216    Procedure: REPAIR, HERNIA, INCISIONAL - W/MESH (Abdomen) Diagnosis: (INCISIONAL HERNIA)    Surgeon: Elie Morales M.D. Responsible Provider: Noah Hines M.D.    Anesthesia Type: general ASA Status: 3          Final Anesthesia Type: general  Last vitals  BP   Blood Pressure: 104/76    Temp   36.2 °C (97.2 °F)    Pulse   Pulse: 64   Resp   18    SpO2   94 %      Anesthesia Post Evaluation    Patient location during evaluation: PACU  Patient participation: complete - patient participated  Level of consciousness: awake and alert  Pain score: 1    Airway patency: patent  Anesthetic complications: no  Cardiovascular status: adequate and hemodynamically stable  Respiratory status: acceptable  Hydration status: acceptable    PONV: none           Nurse Pain Score: 0 (NPRS)

## 2020-08-19 NOTE — OP REPORT
08/19/20    OPERATIVE NOTE    PRE-OPERATIVE DIAGNOSIS - Incisional Hernia     POST-OPERATIVE DIAGNOSIS - Same    PROCEDURE PERFORMED - Repair Incisional Hernia with Mesh     SURGEON - Elie Morales M.D.    ASSISTANT - JAK Alvarenga    TYPE OF ANESTHESIA - General     ANESTHESIOLOGIST  - Dr. Hines       SPECIMENS - None     INDICATIONS - 60 y.o.      PROCEDURE IN DETAIL -     Patient was taken to the operating suite placed in the supine position.  After adequate anesthesia the patient's abdomen was prepped and draped in sterile fashion.  An Ioban was employed.  An incision was made in midline overlying the area of the hernia.  The incision was carried down through the subcutaneous tissue and scar tissue.  Hernia sac was identified and the hernia sac was dissected circumferentially and completely mobilized.  Hernia sac was then resected.  The fascia around the area of the fascial defect was cleared.  Within the abdomen the omentum was taken down from the anterior abdominal wall in order for the mesh to lie flat.  A separate mesh was selected and was placed within the surgical field.  It was then sewn in circumferentially using interrupted #1 Ethibond sutures.  Once the mesh was in proper position covering the fascial defect the fascia was then closed over the mesh using interrupted 3-0 Vicryl pops.  Next the area was irrigated with copious amounts normal saline hemostasis was assured.  Additional 3-0 Vicryl sutures were used for the subcutaneous tissue and the skin was reapproximated using staples.  Sterile dressings were applied and the patient was taken to the recovery room in stable condition.      Elie Morales M.D.

## 2020-08-19 NOTE — ANESTHESIA TIME REPORT
Anesthesia Start and Stop Event Times     Date Time Event    8/19/2020 1043 Ready for Procedure     1126 Anesthesia Start     1216 Anesthesia Stop        Responsible Staff  08/19/20    Name Role Begin End    Noah Hines M.D. Anesth 1126 1216        Preop Diagnosis (Free Text):  Pre-op Diagnosis     INCISIONAL HERNIA        Preop Diagnosis (Codes):    Post op Diagnosis  Incisional hernia      Premium Reason  Non-Premium    Comments:                                                                 repair ventral hernia

## 2020-08-19 NOTE — DISCHARGE INSTRUCTIONS
ACTIVITY: Rest and take it easy for the first 24 hours.  A responsible adult is recommended to remain with you during that time.  It is normal to feel sleepy.  We encourage you to not do anything that requires balance, judgment or coordination.    MILD FLU-LIKE SYMPTOMS ARE NORMAL. YOU MAY EXPERIENCE GENERALIZED MUSCLE ACHES, THROAT IRRITATION, HEADACHE AND/OR SOME NAUSEA.    FOR 24 HOURS DO NOT:  Drive, operate machinery or run household appliances.  Drink beer or alcoholic beverages.   Make important decisions or sign legal documents.    SPECIAL INSTRUCTIONS:    Hernia Repair Discharge Instructions:   1. ACTIVITIES: Upon discharge from the hospital, the day of surgery it is requested that you do no significant physical activity and limit mental activities, as you have had sedation. The day after surgery, you may resume activities of daily living, but for four weeks, it is recommended that you do no strenuous activities or heavy lifting (greater than 15 pounds).   2. DRIVING: You may drive whenever you are off pain medications and are able to perform the activities needed to drive, i.e. turning, bending, twisting, etc.   3. WOUND: It is not unusual for patients to experience swelling and even bruising at the hernia repair site. With inguinal hernias, sometimes the bruising and swelling may extend on to the penis or into the scrotum of male patients. This will resolve over the next few days.   4. ICE: please use ice on the wound to decrease the swelling for the first 24 hours and then discontinue.   5. BATHING: The dressing can be removed two days after surgery and the wound can then be wetted in a shower as normal, but avoid submersion in water (tub bath) for at least a week. Leave steri strips on until they fall off.  It may be necessary to trim the edges.   6. PAIN MEDICATION: You will be given a prescription for pain medication at discharge. Please take these as directed. It is important to remember not to  take medications on an empty stomach as this may cause nausea.   7. BOWEL FUNCTION: After hernia repair, it is not uncommon for patients to experience constipation. This is due to decreasing activity levels as well as pain medications. You may wish to use a stool softener beginning immediately after surgery, and you may or may not need to use a laxative (Milk of Magnesia, Ex-lax; Senokot, etc.) as well.          8 .CALL IF YOU HAVE: (1) Fevers to more than 101.0 F, (2) Unusual chest or leg pain, (3) Drainage or fluid from incision that may be foul smelling, increased tenderness or soreness at the wound or the wound edges are no longer together,redness or swelling at the incision site. Please do not hesitate to call with any other questions.   9. APPOINTMENT: Contact our office at [phone] for a follow-up appointment in 1 to 2 weeks following your procedure. If you have any additional questions, please do not hesitate to call the office and speak to either myself or the physician on call. Office address: [address] [place] [address], [address] Elie Morales M.D. [facility] [phone]    DIET: To avoid nausea, slowly advance diet as tolerated, avoiding spicy or greasy foods for the first day.  Add more substantial food to your diet according to your physician's instructions.  Babies can be fed formula or breast milk as soon as they are hungry.  INCREASE FLUIDS AND FIBER TO AVOID CONSTIPATION.    SURGICAL DRESSING/BATHING: See above    FOLLOW-UP APPOINTMENT:  A follow-up appointment should be arranged with your doctor in 1-2 weeks; call to schedule.    You should CALL YOUR PHYSICIAN if you develop:  Fever greater than 101 degrees F.  Pain not relieved by medication, or persistent nausea or vomiting.  Excessive bleeding (blood soaking through dressing) or unexpected drainage from the wound.  Extreme redness or swelling around the incision site, drainage of pus or foul smelling drainage.  Inability to urinate or  empty your bladder within 8 hours.  Problems with breathing or chest pain.    You should call 911 if you develop problems with breathing or chest pain.  If you are unable to contact your doctor or surgical center, you should go to the nearest emergency room or urgent care center.  Physician's telephone #: Dr Morales [phone]    If any questions arise, call your doctor.  If your doctor is not available, please feel free to call the Surgical Center at [phone].  The Center is open Monday through Friday from 7AM to 7PM.  You can also call the HEALTH HOTLINE open 24 hours/day, 7 days/week and speak to a nurse at [phone], or toll free at [phone].    A registered nurse may call you a few days after your surgery to see how you are doing after your procedure.    MEDICATIONS: Resume taking daily medication.  Take prescribed pain medication with food.  If no medication is prescribed, you may take non-aspirin pain medication if needed.  PAIN MEDICATION CAN BE VERY CONSTIPATING.  Take a stool softener or laxative such as senokot, pericolace, or milk of magnesia if needed.    Prescription given for Norco.  Last pain medication given at 12:21pm.    If your physician has prescribed pain medication that includes Acetaminophen (Tylenol), do not take additional Acetaminophen (Tylenol) while taking the prescribed medication.    Depression / Suicide Risk    As you are discharged from this [facility] facility, it is important to learn how to keep safe from harming yourself.    Recognize the warning signs:  · Abrupt changes in personality, positive or negative- including increase in energy   · Giving away possessions  · Change in eating patterns- significant weight changes-  positive or negative  · Change in sleeping patterns- unable to sleep or sleeping all the time   · Unwillingness or inability to communicate  · Depression  · Unusual sadness, discouragement and loneliness  · Talk of wanting to die  · Neglect  of personal appearance   · Rebelliousness- reckless behavior  · Withdrawal from people/activities they love  · Confusion- inability to concentrate     If you or a loved one observes any of these behaviors or has concerns about self-harm, here's what you can do:  · Talk about it- your feelings and reasons for harming yourself  · Remove any means that you might use to hurt yourself (examples: pills, rope, extension cords, firearm)  · Get professional help from the community (Mental Health, Substance Abuse, psychological counseling)  · Do not be alone:Call your Safe Contact- someone whom you trust who will be there for you.  · Call your local CRISIS HOTLINE 104-5992 or 860-465-1770  · Call your local Children's Mobile Crisis Response Team [place] (930) 614-2373 or www.Nightpro  · Call the toll free National Suicide Prevention Hotlines   · National Suicide Prevention Lifeline 630-201-LRIS (6310)  · National Hope Line Network 800-SUICIDE (179-4561)

## 2020-08-26 ENCOUNTER — HOSPITAL ENCOUNTER (OUTPATIENT)
Dept: LAB | Facility: MEDICAL CENTER | Age: 60
End: 2020-08-26
Attending: UROLOGY
Payer: COMMERCIAL

## 2020-08-26 LAB — AMBIGUOUS DTTM AMBI4: NORMAL

## 2020-08-26 PROCEDURE — 82365 CALCULUS SPECTROSCOPY: CPT

## 2020-09-01 LAB
APPEARANCE STONE: NORMAL
COMPN STONE: NORMAL
NUMBER STONE: NORMAL
SIZE STONE: NORMAL MM
SPECIMEN WT: 808 MG

## 2020-09-14 ENCOUNTER — TELEMEDICINE (OUTPATIENT)
Dept: MEDICAL GROUP | Facility: MEDICAL CENTER | Age: 60
End: 2020-09-14
Payer: COMMERCIAL

## 2020-09-14 VITALS — BODY MASS INDEX: 37.08 KG/M2 | WEIGHT: 259 LBS | HEIGHT: 70 IN | RESPIRATION RATE: 16 BRPM

## 2020-09-14 DIAGNOSIS — E55.9 VITAMIN D DEFICIENCY: ICD-10-CM

## 2020-09-14 DIAGNOSIS — R73.03 PREDIABETES: ICD-10-CM

## 2020-09-14 DIAGNOSIS — E78.00 PURE HYPERCHOLESTEROLEMIA: ICD-10-CM

## 2020-09-14 DIAGNOSIS — Z00.00 PREVENTATIVE HEALTH CARE: ICD-10-CM

## 2020-09-14 DIAGNOSIS — N20.0 CALCULUS OF KIDNEY: ICD-10-CM

## 2020-09-14 DIAGNOSIS — Z12.5 ENCOUNTER FOR SCREENING FOR MALIGNANT NEOPLASM OF PROSTATE: ICD-10-CM

## 2020-09-14 DIAGNOSIS — I72.8 CELIAC ARTERY ANEURYSM (HCC): ICD-10-CM

## 2020-09-14 DIAGNOSIS — I10 ESSENTIAL HYPERTENSION: ICD-10-CM

## 2020-09-14 PROBLEM — R73.9 HYPERGLYCEMIA: Status: RESOLVED | Noted: 2019-08-29 | Resolved: 2020-09-14

## 2020-09-14 PROBLEM — R79.89 ELEVATED TSH: Status: RESOLVED | Noted: 2020-07-01 | Resolved: 2020-09-14

## 2020-09-14 PROBLEM — Z12.11 SCREENING FOR COLON CANCER: Status: RESOLVED | Noted: 2018-11-28 | Resolved: 2020-09-14

## 2020-09-14 PROCEDURE — 99214 OFFICE O/P EST MOD 30 MIN: CPT | Mod: 95,CR | Performed by: INTERNAL MEDICINE

## 2020-09-14 RX ORDER — BISOPROLOL FUMARATE 10 MG/1
TABLET, FILM COATED ORAL
COMMUNITY
End: 2020-11-05 | Stop reason: SDUPTHER

## 2020-09-14 ASSESSMENT — FIBROSIS 4 INDEX: FIB4 SCORE: 1.39

## 2020-09-14 NOTE — PROGRESS NOTES
Telemedicine: Established Patient   This evaluation was conducted via Zoom using secure and encrypted videoconferencing technology. The patient was in a private location in the state of [place].    The patient's identity was confirmed and verbal consent was obtained for this virtual visit.    Subjective:   CC:   Chief Complaint   Patient presents with   • Follow-Up     6 mo       Jhonatan Fonseca is a 60 y.o. male presenting for evaluation and management of:    Routine follow-up appointment.    Recently had incisional hernia repair approximately 1 month ago he says his surgery went well he is recovering appropriately.  Plans to go back to the gym as tolerated 10/1/2020.  Says is weight is up 11 pounds since February due to the gyms being closed because of the pandemic.  Continues work with Dr. Moyer for weight loss, current BMI of 37.    No current symptoms of kidney stones, he is being followed by urology.  Stone analysis 8/26/2020 showed 70% calcium oxalate monohydrate, 10% calcium oxalate dihydrate and 20% calcium phosphorus.  He tells me his urologist sent him dietary information for his stones.    Additional labs 8/14/2020 normal BMP and GFR.  TSH normal 3.890 labs 7/1/2020.  Vitamin D deficiency resolved on labs 5/21/2020 level increase from 27 up to 42.  History of impaired fasting glucose A1c 5.8 on labs 5/21/2020.  Last lipid panel 12/11/2019.    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  He has no abdominal pain, no pain after eating.  Blood pressure remains controlled at home.  He has no cardiopulmonary or strokelike symptoms.  Has not had any interval imaging due to cost, he says unfortunately he became unemployed in January.    ROS  No chest pain, dyspnea, abdominal pain, fever, chills    Allergies   Allergen Reactions   • Lisinopril Hives       Current medicines (including changes today)  Current Outpatient Medications   Medication Sig Dispense Refill   •  atorvastatin (LIPITOR) 20 MG Tab Take 1 Tab by mouth every day. 90 Tab 1   • allopurinol (ZYLOPRIM) 300 MG Tab Take 1 Tab by mouth every day. 90 Tab 1   • tamsulosin (FLOMAX) 0.4 MG capsule Take 0.4 mg by mouth every day.     • fluticasone (FLONASE) 50 MCG/ACT nasal spray Spray 1 Spray in nose every day. 3 Bottle 3   • vitamin D, Ergocalciferol, (DRISDOL) 1.25 MG (57988 UT) Cap capsule TAKE 1 CAPSULE BY MOUTH EVERY 7 DAYS 12 Cap 3   • amLODIPine (NORVASC) 5 MG Tab Take 1 Tab by mouth every day. 90 Tab 1   • loratadine (CLARITIN) 10 MG Tab Take 10 mg by mouth every evening.     • Omega-3 Fatty Acids (FISH OIL) 1000 MG Cap capsule Take 1,000 mg by mouth every evening.     • therapeutic multivitamin-minerals (THERAGRAN-M) Tab Take 1 Tab by mouth every day.     • aspirin EC (ECOTRIN) 81 MG Tablet Delayed Response Take 81 mg by mouth every day.     • bisoprolol (ZEBETA) 10 MG tablet        No current facility-administered medications for this visit.        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     Priority: High   • Low energy 07/01/2020   • Vitamin D deficiency 12/09/2019   • Nephrolithiasis 02/20/2019   • Seasonal allergies 11/28/2018   • Benign prostatic hyperplasia without lower urinary tract symptoms 11/28/2018   • Seasonal allergic rhinitis 11/28/2018   • AK (actinic keratosis) 11/28/2018   • Nocturia 07/11/2017   • Chronic fatigue 07/11/2017   • Chronic pain of left knee 07/11/2017   • Trigger finger of left thumb 07/11/2017   • BMI 36.0-36.9,adult 02/15/2017   • Preventative health care 09/20/2016   • DK (obstructive sleep apnea) 09/19/2016   • Pure hypercholesterolemia 09/19/2016   • Bilateral hearing loss 09/19/2016       Family History   Problem Relation Age of Onset   • Cancer Brother         parathyroid   • Sleep Apnea Brother    • Diabetes Brother    • Sleep Apnea Brother    • Diabetes Father   "      He  has a past medical history of Anesthesia (08/13/2019), Arthritis (08/13/2019), Breath shortness (08/13/2019), Celiac artery aneurysm (HCC), Chickenpox, Diabetes (HCC) (08/14/2020), Dyslipidemia, High cholesterol, Hypertension, Kidney stone, Sleep apnea, and Snoring.  He  has a past surgical history that includes lithotripsy; knee arthroscopy; other orthopedic surgery; bone spur excision; other orthopedic surgery; other; aortofemoral bypass (N/A, 8/15/2019); and incision hernia repair (8/19/2020).       Objective:   Resp 16   Ht 1.778 m (5' 10\")   Wt 117.5 kg (259 lb)   BMI 37.16 kg/m²     Physical Exam  Constitutional: Alert, no distress, well-groomed.  Skin: No rashes in visible areas.  Eye: Round. Conjunctiva clear, lids normal. No icterus.   ENMT: Lips pink without lesions, good dentition, moist mucous membranes. Phonation normal.  Neck: No masses, no thyromegaly. Moves freely without pain.  CV: Pulse as reported by patient  Respiratory: Unlabored respiratory effort, no cough or audible wheeze  Psych: Alert and oriented x3, normal affect and mood.   Assessment and Plan:   The following treatment plan was discussed:     1. Essential hypertension  - CBC WITH DIFFERENTIAL; Future  - Comp Metabolic Panel; Future    2. Nephrolithiasis    3. Pure hypercholesterolemia  - Lipid Profile; Future    4. Preventative health care    5. BMI 36.0-36.9,adult    6. Encounter for screening for malignant neoplasm of prostate  - PROSTATE SPECIFIC AG SCREENING; Future    7. Vitamin D deficiency    8. Prediabetes  - HEMOGLOBIN A1C; Future  - CBC WITH DIFFERENTIAL; Future  - Comp Metabolic Panel; Future    9. Celiac artery aneurysm (HCC)    Other orders  - bisoprolol (ZEBETA) 10 MG tablet    Followed by urology for the history of nephrolithiasis and is following dietary plan without any current symptoms of nephrolithiasis.    Patient reports blood pressure well controlled at home and he is compliant with medication, no " change in management for this chronic, stable condition.    Vitamin D deficiency resolved.    He continues with diet and exercise for his prediabetes, elevated BMI, cholesterol and blood pressure.    Celiac artery aneurysm was repaired 8/15/2019 he remains on statin, blood pressure well controlled.    Follow-up: Return in about 3 months (around 12/14/2020).

## 2020-11-02 DIAGNOSIS — Z76.0 MEDICATION REFILL: ICD-10-CM

## 2020-11-02 NOTE — TELEPHONE ENCOUNTER
Received request via: Pharmacy    Was the patient seen in the last year in this department? Yes    Does the patient have an active prescription (recently filled or refills available) for medication(s) requested? No     LV: 7/1/2020  FV: none

## 2020-11-05 NOTE — TELEPHONE ENCOUNTER
Received request via: Pharmacy    Was the patient seen in the last year in this department? Yes    Does the patient have an active prescription (recently filled or refills available) for medication(s) requested? No     Requested Prescriptions     Pending Prescriptions Disp Refills   • bisoprolol (ZEBETA) 10 MG tablet 30 Tab      Sig: Take 0.5 Tabs by mouth every day.

## 2020-11-09 RX ORDER — BISOPROLOL FUMARATE 10 MG/1
5 TABLET, FILM COATED ORAL DAILY
Qty: 90 TAB | Refills: 1 | Status: SHIPPED
Start: 2020-11-09 | End: 2020-12-16

## 2020-12-09 DIAGNOSIS — N20.0 URIC ACID KIDNEY STONE: ICD-10-CM

## 2020-12-09 RX ORDER — ALLOPURINOL 300 MG/1
300 TABLET ORAL DAILY
Qty: 90 TAB | Refills: 1 | Status: SHIPPED | OUTPATIENT
Start: 2020-12-09 | End: 2021-04-21 | Stop reason: SDUPTHER

## 2020-12-09 NOTE — TELEPHONE ENCOUNTER
Received request via: Pharmacy    Was the patient seen in the last year in this department? Yes    Does the patient have an active prescription (recently filled or refills available) for medication(s) requested? No     Requested Prescriptions     Pending Prescriptions Disp Refills   • allopurinol (ZYLOPRIM) 300 MG Tab 90 Tab 1     Sig: Take 1 Tab by mouth every day.

## 2020-12-16 ENCOUNTER — TELEMEDICINE (OUTPATIENT)
Dept: MEDICAL GROUP | Facility: MEDICAL CENTER | Age: 60
End: 2020-12-16
Payer: COMMERCIAL

## 2020-12-16 VITALS — RESPIRATION RATE: 16 BRPM | HEIGHT: 70 IN | BODY MASS INDEX: 37.37 KG/M2 | WEIGHT: 261 LBS

## 2020-12-16 DIAGNOSIS — R73.01 IMPAIRED FASTING BLOOD SUGAR: ICD-10-CM

## 2020-12-16 DIAGNOSIS — I72.8 CELIAC ARTERY ANEURYSM (HCC): ICD-10-CM

## 2020-12-16 DIAGNOSIS — I10 ESSENTIAL HYPERTENSION: ICD-10-CM

## 2020-12-16 DIAGNOSIS — N20.0 CALCULUS OF KIDNEY: ICD-10-CM

## 2020-12-16 PROCEDURE — 99214 OFFICE O/P EST MOD 30 MIN: CPT | Mod: 95,CR | Performed by: INTERNAL MEDICINE

## 2020-12-16 RX ORDER — HYDROCHLOROTHIAZIDE 25 MG/1
25 TABLET ORAL DAILY
Qty: 30 TAB | Refills: 6 | Status: SHIPPED | OUTPATIENT
Start: 2020-12-16 | End: 2020-12-16 | Stop reason: SDUPTHER

## 2020-12-16 RX ORDER — HYDROCHLOROTHIAZIDE 25 MG/1
25 TABLET ORAL DAILY
Qty: 90 TAB | Refills: 0 | Status: SHIPPED | OUTPATIENT
Start: 2020-12-16 | End: 2021-03-10

## 2020-12-16 ASSESSMENT — FIBROSIS 4 INDEX: FIB4 SCORE: 1.39

## 2020-12-17 NOTE — PROGRESS NOTES
Telemedicine: Established Patient   This evaluation was conducted via Zoom using secure and encrypted videoconferencing technology. The patient was in a private location in the state of [place].    The patient's identity was confirmed and verbal consent was obtained for this virtual visit.    Subjective:   CC:   Chief Complaint   Patient presents with   • Medication Problem     urolgist recommended bp med       Jhonatan Fonseca is a 60 y.o. male presenting for evaluation and management of:    His urologist recommended change in antihypertensive management given history of uric acid kidney stones and this note was reviewed 11/12/2020.  Currently he is prescribed beta-blocker and calcium channel blocker with his history of celiac artery aneurysm which was already surgically repaired.  He says his blood pressure has been well controlled at home.  BMP normal 8/14/2020.    Discussed with him his prior vitamin D deficiency resolved on labs last May he says he is now taking cholecalciferol.    With history of prediabetes A1c 5.8 labs 5/21/2020 says he remains on Metformin that was prescribed by his weight loss physician.  He has gained some weight with Covid, he says he is currently exercising roughly 5 days/week by swimming 1 hour.  His goal is to lose a total of 40 pounds.    ROS    No chest pain, no dyspnea, no fever, no abdominal pain    Allergies   Allergen Reactions   • Lisinopril Hives       Current medicines (including changes today)  Current Outpatient Medications   Medication Sig Dispense Refill   • hydroCHLOROthiazide (HYDRODIURIL) 25 MG Tab Take 1 Tab by mouth every day. 90 Tab 0   • allopurinol (ZYLOPRIM) 300 MG Tab Take 1 Tab by mouth every day. 90 Tab 1   • atorvastatin (LIPITOR) 20 MG Tab Take 1 Tab by mouth every day. 90 Tab 1   • tamsulosin (FLOMAX) 0.4 MG capsule Take 0.4 mg by mouth every day.     • fluticasone (FLONASE) 50 MCG/ACT nasal spray Spray 1 Spray in nose every day. 3 Bottle 3   • amLODIPine  (NORVASC) 5 MG Tab Take 1 Tab by mouth every day. 90 Tab 1   • loratadine (CLARITIN) 10 MG Tab Take 10 mg by mouth every evening.     • Omega-3 Fatty Acids (FISH OIL) 1000 MG Cap capsule Take 1,000 mg by mouth every evening.     • therapeutic multivitamin-minerals (THERAGRAN-M) Tab Take 1 Tab by mouth every day.     • aspirin EC (ECOTRIN) 81 MG Tablet Delayed Response Take 81 mg by mouth every day.     • metFORMIN (GLUCOPHAGE) 500 MG Tab Take 1 Tab by mouth 2 Times a Day.       No current facility-administered medications for this visit.        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     Priority: High   • Impaired fasting blood sugar 12/16/2020   • Low energy 07/01/2020   • Vitamin D deficiency 12/09/2019   • Nephrolithiasis 02/20/2019   • Seasonal allergies 11/28/2018   • Benign prostatic hyperplasia without lower urinary tract symptoms 11/28/2018   • Seasonal allergic rhinitis 11/28/2018   • AK (actinic keratosis) 11/28/2018   • Nocturia 07/11/2017   • Chronic fatigue 07/11/2017   • Chronic pain of left knee 07/11/2017   • Trigger finger of left thumb 07/11/2017   • BMI 36.0-36.9,adult 02/15/2017   • Preventative health care 09/20/2016   • DK (obstructive sleep apnea) 09/19/2016   • Pure hypercholesterolemia 09/19/2016   • Bilateral hearing loss 09/19/2016       Family History   Problem Relation Age of Onset   • Cancer Brother         parathyroid   • Sleep Apnea Brother    • Diabetes Brother    • Sleep Apnea Brother    • Diabetes Father        He  has a past medical history of Anesthesia (08/13/2019), Arthritis (08/13/2019), Breath shortness (08/13/2019), Celiac artery aneurysm (HCC), Chickenpox, Diabetes (HCC) (08/14/2020), Dyslipidemia, High cholesterol, Hypertension, Kidney stone, Sleep apnea, and Snoring.  He  has a past surgical history that includes lithotripsy; knee arthroscopy; other  "orthopedic surgery; bone spur excision; other orthopedic surgery; other; aortofemoral bypass (N/A, 8/15/2019); and incision hernia repair (8/19/2020).       Objective:   Resp 16   Ht 1.778 m (5' 10\")   Wt 118.4 kg (261 lb)   BMI 37.45 kg/m²     Physical Exam  Constitutional: Alert, no distress, well-groomed.  Skin: No rashes in visible areas.  Eye: Round. Conjunctiva clear, lids normal. No icterus.   ENMT: Lips pink without lesions, good dentition, moist mucous membranes. Phonation normal.  Neck: No masses, no thyromegaly. Moves freely without pain.  CV: Pulse as reported by patient  Respiratory: Unlabored respiratory effort, no cough or audible wheeze  Psych: Alert and oriented x3, normal affect and mood.     Assessment and Plan:   The following treatment plan was discussed:     1. Essential hypertension  - hydroCHLOROthiazide (HYDRODIURIL) 25 MG Tab; Take 1 Tab by mouth every day.  Dispense: 90 Tab; Refill: 0    2. Impaired fasting blood sugar    3. Celiac artery aneurysm (HCC)    4. Nephrolithiasis    Other orders  - metFORMIN (GLUCOPHAGE) 500 MG Tab; Take 1 Tab by mouth 2 Times a Day.    Blood pressure is currently controlled but plan to stop beta-blocker.  Continue amlodipine, calcium channel properties should still help with vasorelaxation with his history of aneurysm.  Start hydrochlorothiazide with history of uric acid stones.  Patient knows to get his electrolytes checked in 7 to 10 days after starting hydrochlorothiazide, risk of electrolyte disorder discussed with patient and he says he will get his labs done.    He is working on weight loss, diet and exercise for his history of impaired fasting glucose.  Reasonable to continue metformin.    Plan for interval monitoring of his aneurysm for any new aneurysmal formation, continue calcium channel blocker and statin.    Follow-up: Return in about 6 months (around 6/16/2021).         "

## 2020-12-22 DIAGNOSIS — Z76.0 MEDICATION REFILL: ICD-10-CM

## 2020-12-22 RX ORDER — ATORVASTATIN CALCIUM 20 MG/1
20 TABLET, FILM COATED ORAL
Qty: 90 TAB | Refills: 0 | Status: SHIPPED | OUTPATIENT
Start: 2020-12-22 | End: 2021-03-25 | Stop reason: SDUPTHER

## 2020-12-22 NOTE — TELEPHONE ENCOUNTER
Received request via: Pharmacy    Was the patient seen in the last year in this department? NO    Does the patient have an active prescription (recently filled or refills available) for medication(s) requested? No     LOV 12/09/2020

## 2020-12-28 ENCOUNTER — HOSPITAL ENCOUNTER (OUTPATIENT)
Dept: LAB | Facility: MEDICAL CENTER | Age: 60
End: 2020-12-28
Attending: INTERNAL MEDICINE
Payer: COMMERCIAL

## 2020-12-28 ENCOUNTER — PATIENT MESSAGE (OUTPATIENT)
Dept: MEDICAL GROUP | Facility: MEDICAL CENTER | Age: 60
End: 2020-12-28

## 2020-12-28 DIAGNOSIS — I10 ESSENTIAL HYPERTENSION: ICD-10-CM

## 2020-12-28 DIAGNOSIS — Z12.5 ENCOUNTER FOR SCREENING FOR MALIGNANT NEOPLASM OF PROSTATE: ICD-10-CM

## 2020-12-28 DIAGNOSIS — E78.00 PURE HYPERCHOLESTEROLEMIA: ICD-10-CM

## 2020-12-28 DIAGNOSIS — R73.03 PREDIABETES: ICD-10-CM

## 2020-12-28 LAB
ALBUMIN SERPL BCP-MCNC: 4.2 G/DL (ref 3.2–4.9)
ALBUMIN/GLOB SERPL: 1.3 G/DL
ALP SERPL-CCNC: 151 U/L (ref 30–99)
ALT SERPL-CCNC: 25 U/L (ref 2–50)
ANION GAP SERPL CALC-SCNC: 7 MMOL/L (ref 7–16)
AST SERPL-CCNC: 30 U/L (ref 12–45)
BASOPHILS # BLD AUTO: 0.9 % (ref 0–1.8)
BASOPHILS # BLD: 0.07 K/UL (ref 0–0.12)
BILIRUB SERPL-MCNC: 0.3 MG/DL (ref 0.1–1.5)
BUN SERPL-MCNC: 17 MG/DL (ref 8–22)
CALCIUM SERPL-MCNC: 10.1 MG/DL (ref 8.5–10.5)
CHLORIDE SERPL-SCNC: 104 MMOL/L (ref 96–112)
CHOLEST SERPL-MCNC: 148 MG/DL (ref 100–199)
CO2 SERPL-SCNC: 30 MMOL/L (ref 20–33)
CREAT SERPL-MCNC: 0.8 MG/DL (ref 0.5–1.4)
EOSINOPHIL # BLD AUTO: 0.26 K/UL (ref 0–0.51)
EOSINOPHIL NFR BLD: 3.4 % (ref 0–6.9)
ERYTHROCYTE [DISTWIDTH] IN BLOOD BY AUTOMATED COUNT: 46.7 FL (ref 35.9–50)
EST. AVERAGE GLUCOSE BLD GHB EST-MCNC: 120 MG/DL
FASTING STATUS PATIENT QL REPORTED: NORMAL
GLOBULIN SER CALC-MCNC: 3.3 G/DL (ref 1.9–3.5)
GLUCOSE SERPL-MCNC: 94 MG/DL (ref 65–99)
HBA1C MFR BLD: 5.8 % (ref 0–5.6)
HCT VFR BLD AUTO: 45.6 % (ref 42–52)
HDLC SERPL-MCNC: 72 MG/DL
HGB BLD-MCNC: 14.3 G/DL (ref 14–18)
IMM GRANULOCYTES # BLD AUTO: 0.02 K/UL (ref 0–0.11)
IMM GRANULOCYTES NFR BLD AUTO: 0.3 % (ref 0–0.9)
LDLC SERPL CALC-MCNC: 68 MG/DL
LYMPHOCYTES # BLD AUTO: 3.25 K/UL (ref 1–4.8)
LYMPHOCYTES NFR BLD: 42 % (ref 22–41)
MCH RBC QN AUTO: 28.5 PG (ref 27–33)
MCHC RBC AUTO-ENTMCNC: 31.4 G/DL (ref 33.7–35.3)
MCV RBC AUTO: 90.8 FL (ref 81.4–97.8)
MONOCYTES # BLD AUTO: 0.85 K/UL (ref 0–0.85)
MONOCYTES NFR BLD AUTO: 11 % (ref 0–13.4)
NEUTROPHILS # BLD AUTO: 3.29 K/UL (ref 1.82–7.42)
NEUTROPHILS NFR BLD: 42.4 % (ref 44–72)
NRBC # BLD AUTO: 0 K/UL
NRBC BLD-RTO: 0 /100 WBC
PLATELET # BLD AUTO: 238 K/UL (ref 164–446)
PMV BLD AUTO: 11 FL (ref 9–12.9)
POTASSIUM SERPL-SCNC: 3.8 MMOL/L (ref 3.6–5.5)
PROT SERPL-MCNC: 7.5 G/DL (ref 6–8.2)
PSA SERPL-MCNC: 1.47 NG/ML (ref 0–4)
RBC # BLD AUTO: 5.02 M/UL (ref 4.7–6.1)
SODIUM SERPL-SCNC: 141 MMOL/L (ref 135–145)
TRIGL SERPL-MCNC: 38 MG/DL (ref 0–149)
WBC # BLD AUTO: 7.7 K/UL (ref 4.8–10.8)

## 2020-12-28 PROCEDURE — 85025 COMPLETE CBC W/AUTO DIFF WBC: CPT

## 2020-12-28 PROCEDURE — 83036 HEMOGLOBIN GLYCOSYLATED A1C: CPT

## 2020-12-28 PROCEDURE — 36415 COLL VENOUS BLD VENIPUNCTURE: CPT

## 2020-12-28 PROCEDURE — 80053 COMPREHEN METABOLIC PANEL: CPT

## 2020-12-28 PROCEDURE — 80061 LIPID PANEL: CPT

## 2020-12-28 PROCEDURE — 84153 ASSAY OF PSA TOTAL: CPT

## 2020-12-29 RX ORDER — AMLODIPINE BESYLATE 5 MG/1
5 TABLET ORAL
Qty: 90 TAB | Refills: 1 | Status: SHIPPED | OUTPATIENT
Start: 2020-12-29 | End: 2021-01-12 | Stop reason: SDUPTHER

## 2021-01-11 ENCOUNTER — PATIENT MESSAGE (OUTPATIENT)
Dept: MEDICAL GROUP | Facility: MEDICAL CENTER | Age: 61
End: 2021-01-11

## 2021-01-11 DIAGNOSIS — I10 ESSENTIAL HYPERTENSION: ICD-10-CM

## 2021-01-12 RX ORDER — AMLODIPINE BESYLATE 5 MG/1
5 TABLET ORAL
Qty: 90 TAB | Refills: 1 | Status: SHIPPED | OUTPATIENT
Start: 2021-01-12 | End: 2021-04-28

## 2021-01-12 NOTE — TELEPHONE ENCOUNTER
From: Jhonatan Fonseca  To: Leigh Bishop M.D.  Sent: 1/11/2021 1:15 PM PST  Subject: Prescription Question    Hi Dr. Bishop,    Happy New Year! Can you please reorder a refill from our new mail-order pharmacy ([facility]) my prescription for Amlodipine Besylate 5MG qty 90. I am all out right now.    Thanks!    Alan Fonseca

## 2021-03-09 ENCOUNTER — PATIENT MESSAGE (OUTPATIENT)
Dept: MEDICAL GROUP | Facility: MEDICAL CENTER | Age: 61
End: 2021-03-09

## 2021-03-10 DIAGNOSIS — I10 ESSENTIAL HYPERTENSION: ICD-10-CM

## 2021-03-10 RX ORDER — HYDROCHLOROTHIAZIDE 25 MG/1
TABLET ORAL
Qty: 90 TABLET | Refills: 0 | Status: SHIPPED | OUTPATIENT
Start: 2021-03-10 | End: 2021-03-18

## 2021-03-15 DIAGNOSIS — Z23 NEED FOR VACCINATION: ICD-10-CM

## 2021-03-18 ENCOUNTER — OFFICE VISIT (OUTPATIENT)
Dept: MEDICAL GROUP | Facility: MEDICAL CENTER | Age: 61
End: 2021-03-18
Payer: COMMERCIAL

## 2021-03-18 VITALS
HEART RATE: 88 BPM | WEIGHT: 269 LBS | HEIGHT: 70 IN | SYSTOLIC BLOOD PRESSURE: 140 MMHG | OXYGEN SATURATION: 92 % | RESPIRATION RATE: 16 BRPM | TEMPERATURE: 99.1 F | DIASTOLIC BLOOD PRESSURE: 80 MMHG | BODY MASS INDEX: 38.51 KG/M2

## 2021-03-18 DIAGNOSIS — I10 ESSENTIAL HYPERTENSION: ICD-10-CM

## 2021-03-18 DIAGNOSIS — G62.9 NEUROPATHY: ICD-10-CM

## 2021-03-18 DIAGNOSIS — R74.8 ELEVATED ALKALINE PHOSPHATASE LEVEL: ICD-10-CM

## 2021-03-18 DIAGNOSIS — R20.0 NUMBNESS OF RIGHT FOOT: ICD-10-CM

## 2021-03-18 DIAGNOSIS — R20.0 HAND NUMBNESS: ICD-10-CM

## 2021-03-18 DIAGNOSIS — R73.01 IMPAIRED FASTING BLOOD SUGAR: ICD-10-CM

## 2021-03-18 PROCEDURE — 99214 OFFICE O/P EST MOD 30 MIN: CPT | Performed by: INTERNAL MEDICINE

## 2021-03-18 ASSESSMENT — PATIENT HEALTH QUESTIONNAIRE - PHQ9: CLINICAL INTERPRETATION OF PHQ2 SCORE: 0

## 2021-03-18 ASSESSMENT — FIBROSIS 4 INDEX: FIB4 SCORE: 1.54

## 2021-03-18 NOTE — PROGRESS NOTES
"CC:  Diagnoses of Impaired fasting blood sugar, Essential hypertension, Neuropathy, BMI 36.0-36.9,adult, Hand numbness, and Numbness of right foot were pertinent to this visit.    HISTORY OF THE PRESENT ILLNESS: Patient is a 60 y.o. male. This pleasant patient is here today for follow-up.    Norm is primarily here to discuss his neuropathy symptoms.  He says that in the past 3 weeks or so he has had constant mild numbness right hand particularly first through third fingers, but can still feel objects to open lids, etc.  His left hand had some very brief symptoms as well which resolved.  He says he is left-handed, he does not think he had any overuse.  The numbness and tingling paresthesia does not go past his wrist more proximally.  No neck pain also no back pain.  No weakness x4 extremities.  Also has 10-year history of numbness of the right foot particularly the plantar surface ball of the foot towards the toes and this does not radiate more proximally.  He did have some brief neuropathy symptoms of left foot as well.  He says he tried his chiropractor and symptoms are not improved.  He has not had a recent lumbar x-ray.    Last appointment we started hydrochlorothiazide due to his history of hypertension and nephrolithiasis.  He feels hydrochlorothiazide was not tolerable he said it made him feel \"irritable \"and raised his resting heart beat from 60 to 90s.  He says now he is just on amlodipine and blood pressure at home is 120 over 70s consistently.  His blood pressure is a little higher today in clinic because he had a lot of caffeine and he rushed to get here.  No cardiopulmonary or strokelike symptoms.    Labs 12/28/2020 reviewed A1c remains at 5.8.  He says he is having a lot of trouble sticking on his diet exercise plan with the pandemic and cost of the gyms.  He is agreeable to see if insurance will cover Victoza, no family history of medullary thyroid cancer, brother did have parathyroid cancer.  He has " "no personal history of thyroid cancer.  That time CBC also showed that hemoglobin and WBCs normalized.  He had alkaline phosphatase of 151 but no focal bone pain, no GI symptoms.  Cholesterol controlled total 148, triglyceride 38, HDL 72 and LDL 68.    Allergies: Lisinopril    Current Outpatient Medications Ordered in Epic   Medication Sig Dispense Refill   • liraglutide (VICTOZA) 18 MG/3ML Solution Pen-injector Inject 0.6 mg under the skin every day for 7 days, THEN 1.2 mg every day for 7 days, THEN 1.8 mg every day for 21 days. 8.4 mL 0   • metFORMIN (GLUCOPHAGE) 500 MG Tab Take 1 tablet by mouth 2 Times a Day. 180 tablet 1   • amLODIPine (NORVASC) 5 MG Tab Take 1 Tab by mouth every day. 90 Tab 1   • atorvastatin (LIPITOR) 20 MG Tab Take 1 Tab by mouth every day. 90 Tab 0   • allopurinol (ZYLOPRIM) 300 MG Tab Take 1 Tab by mouth every day. 90 Tab 1   • tamsulosin (FLOMAX) 0.4 MG capsule Take 0.4 mg by mouth every day.     • fluticasone (FLONASE) 50 MCG/ACT nasal spray Spray 1 Spray in nose every day. 3 Bottle 3   • loratadine (CLARITIN) 10 MG Tab Take 10 mg by mouth every evening.     • Omega-3 Fatty Acids (FISH OIL) 1000 MG Cap capsule Take 1,000 mg by mouth every evening.     • therapeutic multivitamin-minerals (THERAGRAN-M) Tab Take 1 Tab by mouth every day.     • aspirin EC (ECOTRIN) 81 MG Tablet Delayed Response Take 81 mg by mouth every day.       No current [facility]-ordered facility-administered medications on file.       Past Medical History:   Diagnosis Date   • Anesthesia 08/13/2019    \"I was told I turned green on table during foot surgery about 20 years ago.\"   • Arthritis 08/13/2019    Left Knee, Right Hand    • Breath shortness 08/13/2019    Pt. states overweight & out of shape.   • Celiac artery aneurysm (HCC)    • Chickenpox    • Diabetes (HCC) 08/14/2020    Takes Metformin to control A1C but not actually diagnosed   • Dyslipidemia    • High cholesterol    • Hypertension    • Kidney stone    • Sleep " "apnea     CPAP USE   • Snoring     DX DK       Past Surgical History:   Procedure Laterality Date   • INCISION HERNIA REPAIR  8/19/2020    Procedure: REPAIR, HERNIA, INCISIONAL - W/MESH;  Surgeon: Elie Morales M.D.;  Location: SURGERY [facility];  Service: General   • AORTOFEMORAL BYPASS N/A 8/15/2019    Procedure: CREATION, bypass arterial, celiac artery aneurism repair;  Surgeon: Elie Morales M.D.;  Location: SURGERY [facility];  Service: General   • BONE SPUR EXCISION     • KNEE ARTHROSCOPY      repair micscus tear   • LITHOTRIPSY     • OTHER      Surgery for right shoulder dislocation and torn ligaments 1974.   • OTHER ORTHOPEDIC SURGERY      Left Foot Surgery x3 from broken foot in high school.   • OTHER ORTHOPEDIC SURGERY      Right Achilles Surgery       Social History     Tobacco Use   • Smoking status: Never Smoker   • Smokeless tobacco: Never Used   Substance Use Topics   • Alcohol use: Yes     Alcohol/week: 0.0 oz     Comment: 2/month   • Drug use: No       Social History     Social History Narrative   • Not on file       Family History   Problem Relation Age of Onset   • Cancer Brother         parathyroid   • Sleep Apnea Brother    • Diabetes Brother    • Sleep Apnea Brother    • Diabetes Father        Exam: /80 (BP Location: Right arm, Patient Position: Sitting, BP Cuff Size: Adult)   Pulse 88   Temp 37.3 °C (99.1 °F) (Temporal)   Resp 16   Ht 1.778 m (5' 10\")   Wt 122 kg (269 lb)   SpO2 92%  Body mass index is 38.6 kg/m².    General: Normal appearing. No distress.  EYES: Conjunctiva clear lids without ptosis, pupils equal  EARS: Normal shape and contour   Pulmonary: Clear to ausculation.  Normal effort. No rales or wheezing.  Cardiovascular: Regular rate and rhythm without significant murmur.   Abdomen: Soft, nontender, nondistended. Normal bowel sounds.  Neurologic: Cranial nerves grossly nonfocal.  Strength 5 out of 5 throughout including testing all fingers and plantar " extension/flexion, etc.  DTRs 2+ bilaterally specifically in biceps.  No atrophy noted.  Skin: Warm and dry.  No obvious lesions.  Musculoskeletal: Normal gait. No extremity cyanosis, clubbing, or edema.  Psych: Normal mood and affect. Alert and oriented x3. Judgment and insight is normal.      Assessment/Plan  1. Impaired fasting blood sugar  Plan to try Victoza for prediabetes and obesity.  Noted on Metformin as well.  - liraglutide (VICTOZA) 18 MG/3ML Solution Pen-injector; Inject 0.6 mg under the skin every day for 7 days, THEN 1.2 mg every day for 7 days, THEN 1.8 mg every day for 21 days.  Dispense: 8.4 mL; Refill: 0    2. Essential hypertension  He reports well controlled at home now on amlodipine 5 mg daily.  He did not tolerate hydrochlorothiazide, w/ history of nephrolithiasis could consider chlorthalidone in the future if needed.    3. Neuropathy  See #5 and 6 below  - TSH WITH REFLEX TO FT4; Future  - VITAMIN B12; Future  - VITAMIN B1; Future  - VITAMIN B6; Future  - SPEP W/REFLEX TO ARCHANA, A, G, M; Future  - REFERRAL TO OCCUPATIONAL THERAPY  - REFERRAL TO OUTPATIENT INTERVENTIONAL PAIN CLINIC    4. BMI 36.0-36.9,adult  With impaired fasting glucose and obesity we will see if his insurance will cover Victoza.  No family history of medullary thyroid cancer.  - liraglutide (VICTOZA) 18 MG/3ML Solution Pen-injector; Inject 0.6 mg under the skin every day for 7 days, THEN 1.2 mg every day for 7 days, THEN 1.8 mg every day for 21 days.  Dispense: 8.4 mL; Refill: 0    5. Hand numbness ? CTS  Likely distal cause of neuropathy at the wrist, referred to hand therapy, if symptoms do not resolve can consider physiatry referral for potential EMG testing and other evaluation.  Advised start B complex supplement empirically.  Also review labs for any other sources of neuropathy.  - REFERRAL TO OCCUPATIONAL THERAPY  - REFERRAL TO OUTPATIENT INTERVENTIONAL PAIN CLINIC    6. Numbness of right foot  Could consider lumbar  source of this pre-existing chronic numbness.  Plan for plain films, follow-up physiatry if needed.  - DX-LUMBAR SPINE-2 OR 3 VIEWS; Future  - REFERRAL TO OUTPATIENT INTERVENTIONAL PAIN CLINIC      rtc 3m        Please note that this dictation was created using voice recognition software. I have made every reasonable attempt to correct obvious errors, but I expect that there are errors of grammar and possibly content that I did not discover before finalizing the note.

## 2021-03-23 ENCOUNTER — IMMUNIZATION (OUTPATIENT)
Dept: FAMILY PLANNING/WOMEN'S HEALTH CLINIC | Facility: IMMUNIZATION CENTER | Age: 61
End: 2021-03-23
Attending: INTERNAL MEDICINE
Payer: COMMERCIAL

## 2021-03-23 DIAGNOSIS — Z23 NEED FOR VACCINATION: ICD-10-CM

## 2021-03-23 DIAGNOSIS — Z23 ENCOUNTER FOR VACCINATION: Primary | ICD-10-CM

## 2021-03-23 PROCEDURE — 91300 PFIZER SARS-COV-2 VACCINE: CPT

## 2021-03-23 PROCEDURE — 0001A PFIZER SARS-COV-2 VACCINE: CPT

## 2021-03-25 ENCOUNTER — PATIENT MESSAGE (OUTPATIENT)
Dept: MEDICAL GROUP | Facility: MEDICAL CENTER | Age: 61
End: 2021-03-25

## 2021-03-25 DIAGNOSIS — Z76.0 MEDICATION REFILL: ICD-10-CM

## 2021-03-25 RX ORDER — ATORVASTATIN CALCIUM 20 MG/1
20 TABLET, FILM COATED ORAL
Qty: 90 TABLET | Refills: 0 | Status: SHIPPED | OUTPATIENT
Start: 2021-03-25 | End: 2021-04-06 | Stop reason: SDUPTHER

## 2021-03-29 ENCOUNTER — OFFICE VISIT (OUTPATIENT)
Dept: MEDICAL GROUP | Facility: MEDICAL CENTER | Age: 61
End: 2021-03-29
Payer: COMMERCIAL

## 2021-03-29 VITALS
HEIGHT: 70 IN | OXYGEN SATURATION: 95 % | BODY MASS INDEX: 38.51 KG/M2 | HEART RATE: 92 BPM | SYSTOLIC BLOOD PRESSURE: 130 MMHG | RESPIRATION RATE: 14 BRPM | TEMPERATURE: 98.3 F | DIASTOLIC BLOOD PRESSURE: 80 MMHG | WEIGHT: 269 LBS

## 2021-03-29 DIAGNOSIS — R00.2 PALPITATIONS: ICD-10-CM

## 2021-03-29 DIAGNOSIS — R20.0 NUMBNESS OF RIGHT HAND: ICD-10-CM

## 2021-03-29 PROCEDURE — 99213 OFFICE O/P EST LOW 20 MIN: CPT | Performed by: INTERNAL MEDICINE

## 2021-03-29 ASSESSMENT — FIBROSIS 4 INDEX: FIB4 SCORE: 1.54

## 2021-03-29 NOTE — PROGRESS NOTES
CC:  Diagnoses of Palpitations and Numbness of right hand were pertinent to this visit.    HISTORY OF THE PRESENT ILLNESS: Patient is a 60 y.o. male. This pleasant patient is here today for concern over palpitations.    He is able to bring up data from his watch from at least June 2020, says the palpitations started October 28, 2020 at that time heart rate of 136 for 5 minutes, subsequently had similar episodes lasting longer patient notes 11/3/2020 heart rate 152 for 2 hours, 1/16/2159 for 2 hours, 1/27/2021 heart rate 158 for 3 hours, 2/8/2021 heart rate 158 for 1 hour, 2/10/2021 heart rate 155 for 3 hours, 2/19/2021 heart rate 154 for 3 hours.  He says when his heart rate has been elevated he has been doing normal tasks at home.  Denies any associated symptoms of chest pain, sense of irregular/skipped beats, dyspnea, lightheaded/dizzy, etc.  When he exercises he feels fine though did note once when he was swimming he felt his heart rate was higher than it should be.  He says he drinks caffeine every day and there was no change in his consumption.  He takes over-the-counter B supplement and allergy pill at night but his over-the-counter medications have not changed at all.  He does not feel that he is dehydrated, anxious, in pain or other source of the elevated heart rate.  For his neuropathy symptoms his thyroid panel is still pending.  Labs during his current symptoms have shown no anemia, reasonable CMP with normal electrolytes.  He did have ECG 8/14/2020 showing heart rate 72 sinus rhythm with QTc interval 421 ms.  Patient describes more remotely 2018 he had cardiac stress test at [place] which he reports is normal.    Allergies: Lisinopril and Hydrochlorothiazide    Current Outpatient Medications Ordered in Epic   Medication Sig Dispense Refill   • atorvastatin (LIPITOR) 20 MG Tab Take 1 tablet by mouth every day. 90 tablet 0   • liraglutide (VICTOZA) 18 MG/3ML Solution Pen-injector Inject 0.6 mg under the  "skin every day for 7 days, THEN 1.2 mg every day for 7 days, THEN 1.8 mg every day for 21 days. 8.4 mL 0   • metFORMIN (GLUCOPHAGE) 500 MG Tab Take 1 tablet by mouth 2 Times a Day. 180 tablet 1   • amLODIPine (NORVASC) 5 MG Tab Take 1 Tab by mouth every day. 90 Tab 1   • allopurinol (ZYLOPRIM) 300 MG Tab Take 1 Tab by mouth every day. 90 Tab 1   • tamsulosin (FLOMAX) 0.4 MG capsule Take 0.4 mg by mouth every day.     • fluticasone (FLONASE) 50 MCG/ACT nasal spray Spray 1 Spray in nose every day. 3 Bottle 3   • loratadine (CLARITIN) 10 MG Tab Take 10 mg by mouth every evening.     • Omega-3 Fatty Acids (FISH OIL) 1000 MG Cap capsule Take 1,000 mg by mouth every evening.     • therapeutic multivitamin-minerals (THERAGRAN-M) Tab Take 1 Tab by mouth every day.     • aspirin EC (ECOTRIN) 81 MG Tablet Delayed Response Take 81 mg by mouth every day.       No current [facility]-ordered facility-administered medications on file.       Past Medical History:   Diagnosis Date   • Anesthesia 08/13/2019    \"I was told I turned green on table during foot surgery about 20 years ago.\"   • Arthritis 08/13/2019    Left Knee, Right Hand    • Breath shortness 08/13/2019    Pt. states overweight & out of shape.   • Celiac artery aneurysm (HCC)    • Chickenpox    • Diabetes (HCC) 08/14/2020    Takes Metformin to control A1C but not actually diagnosed   • Dyslipidemia    • High cholesterol    • Hypertension    • Kidney stone    • Sleep apnea     CPAP USE   • Snoring     DX DK       Past Surgical History:   Procedure Laterality Date   • INCISION HERNIA REPAIR  8/19/2020    Procedure: REPAIR, HERNIA, INCISIONAL - W/MESH;  Surgeon: Elie Morales M.D.;  Location: SURGERY [place] [facility];  Service: General   • AORTOFEMORAL BYPASS N/A 8/15/2019    Procedure: CREATION, bypass arterial, celiac artery aneurism repair;  Surgeon: Elie Morales M.D.;  Location: SURGERY [place] [facility];  Service: General   • BONE SPUR EXCISION     • KNEE ARTHROSCOPY      " "repair micscus tear   • LITHOTRIPSY     • OTHER      Surgery for right shoulder dislocation and torn ligaments 1974.   • OTHER ORTHOPEDIC SURGERY      Left Foot Surgery x3 from broken foot in high school.   • OTHER ORTHOPEDIC SURGERY      Right Achilles Surgery       Social History     Tobacco Use   • Smoking status: Never Smoker   • Smokeless tobacco: Never Used   Substance Use Topics   • Alcohol use: Yes     Alcohol/week: 0.0 oz     Comment: 2/month   • Drug use: No       Social History     Social History Narrative   • Not on file       Family History   Problem Relation Age of Onset   • Cancer Brother         parathyroid   • Sleep Apnea Brother    • Diabetes Brother    • Sleep Apnea Brother    • Diabetes Father        Exam: /80 (BP Location: Left arm, Patient Position: Sitting, BP Cuff Size: Adult)   Pulse 92   Temp 36.8 °C (98.3 °F) (Temporal)   Resp 14   Ht 1.778 m (5' 10\")   Wt 122 kg (269 lb)   SpO2 95%  Body mass index is 38.6 kg/m².    General: Normal appearing. No distress.  EYES: Conjunctiva clear lids without ptosis, pupils equal  EARS: Normal shape and contour   Pulmonary: Clear to ausculation.  Normal effort. No rales or wheezing.  Cardiovascular: Regular rate and rhythm without significant murmur.   Abdomen: Soft, nontender, nondistended. Normal bowel sounds.  Neurologic: Cranial nerves grossly nonfocal  Skin: Warm and dry.  No obvious lesions.  Musculoskeletal: Normal gait. No extremity cyanosis, clubbing, or edema.  Psych: Normal mood and affect. Alert and oriented x3. Judgment and insight is normal.      Assessment/Plan  1. Palpitations  We will plan to define the heart rhythm sinus tachycardia versus other with monitor.  Echocardiogram rule out any signs of depressed EF, valvulopathy, etc.  Pending thyroid study.  Otherwise electrolytes and hemoglobin during his episodes of palpitation have been normal.  Obtain his stress test which was reportedly normal.  Pending results potential to " refer to cardiology if needed.  - EC-ECHOCARDIOGRAM COMPLETE W/O CONT; Future  - RI Zio Patch Monitor; Future    2. Numbness of right hand  Pending labs for neuropathy referral to hand therapist.  - REFERRAL TO HAND THERAPY      rtc after testing         Please note that this dictation was created using voice recognition software. I have made every reasonable attempt to correct obvious errors, but I expect that there are errors of grammar and possibly content that I did not discover before finalizing the note.

## 2021-03-29 NOTE — LETTER
[facility]  Leigh Bishop M.D.  [address]  [place] [address]  Fax: [phone]   Authorization for Release/Disclosure of   Protected Health Information   Name: JEOVANY RDZ : 1960 SSN: [id number]   Address: [address] [place] [address] [address] [place] [id number] Phone:    [phone] (home)    I authorize the entity listed below to release/disclose the PHI below to:   [facility]/Leigh Bishop M.D. and Leigh Bishop M.D.   Provider or Entity Name:  [facility]    Address   City, [facility], [facility]   Phone:      Fax:     Reason for request: continuity of care   Information to be released:    [  ] LAST COLONOSCOPY,  including any PATH REPORT and follow-up  [  ] LAST FIT/COLOGUARD RESULT [  ] LAST DEXA  [  ] LAST MAMMOGRAM  [  ] LAST PAP  [  ] LAST LABS [  ] RETINA EXAM REPORT  [  ] IMMUNIZATION RECORDS  [ x ] Release all info      [  ] Check here and initial the line next to each item to release ALL health information INCLUDING  _____ Care and treatment for drug and / or alcohol abuse  _____ HIV testing, infection status, or AIDS  _____ Genetic Testing    DATES OF SERVICE OR TIME PERIOD TO BE DISCLOSED: _____________  I understand and acknowledge that:  * This Authorization may be revoked at any time by you in writing, except if your health information has already been used or disclosed.  * Your health information that will be used or disclosed as a result of you signing this authorization could be re-disclosed by the recipient. If this occurs, your re-disclosed health information may no longer be protected by State or Federal laws.  * You may refuse to sign this Authorization. Your refusal will not affect your ability to obtain treatment.  * This Authorization becomes effective upon signing and will  on (date) __________.      If no date is indicated, this Authorization will  one (1) year from the signature date.    Name: Jeovany Rdz    Signature:   Date:          3/29/2021       PLEASE FAX REQUESTED RECORDS BACK TO: [phone]

## 2021-04-06 ENCOUNTER — PATIENT MESSAGE (OUTPATIENT)
Dept: MEDICAL GROUP | Facility: MEDICAL CENTER | Age: 61
End: 2021-04-06

## 2021-04-06 DIAGNOSIS — Z76.0 MEDICATION REFILL: ICD-10-CM

## 2021-04-06 RX ORDER — ATORVASTATIN CALCIUM 20 MG/1
20 TABLET, FILM COATED ORAL
Qty: 90 TABLET | Refills: 0 | Status: SHIPPED | OUTPATIENT
Start: 2021-04-06 | End: 2021-04-28

## 2021-04-09 ENCOUNTER — PATIENT MESSAGE (OUTPATIENT)
Dept: MEDICAL GROUP | Facility: MEDICAL CENTER | Age: 61
End: 2021-04-09

## 2021-04-15 ENCOUNTER — IMMUNIZATION (OUTPATIENT)
Dept: FAMILY PLANNING/WOMEN'S HEALTH CLINIC | Facility: IMMUNIZATION CENTER | Age: 61
End: 2021-04-15
Attending: INTERNAL MEDICINE
Payer: COMMERCIAL

## 2021-04-15 DIAGNOSIS — Z23 ENCOUNTER FOR VACCINATION: Primary | ICD-10-CM

## 2021-04-15 PROCEDURE — 0002A PFIZER SARS-COV-2 VACCINE: CPT

## 2021-04-15 PROCEDURE — 91300 PFIZER SARS-COV-2 VACCINE: CPT

## 2021-04-21 ENCOUNTER — PATIENT MESSAGE (OUTPATIENT)
Dept: MEDICAL GROUP | Facility: MEDICAL CENTER | Age: 61
End: 2021-04-21

## 2021-04-21 DIAGNOSIS — N20.0 NEPHROLITHIASIS: ICD-10-CM

## 2021-04-21 DIAGNOSIS — N20.0 URIC ACID KIDNEY STONE: ICD-10-CM

## 2021-04-21 RX ORDER — ALLOPURINOL 300 MG/1
300 TABLET ORAL DAILY
Qty: 90 TABLET | Refills: 1 | Status: SHIPPED | OUTPATIENT
Start: 2021-04-21 | End: 2021-04-26 | Stop reason: SDUPTHER

## 2021-04-26 ENCOUNTER — PATIENT MESSAGE (OUTPATIENT)
Dept: MEDICAL GROUP | Facility: MEDICAL CENTER | Age: 61
End: 2021-04-26

## 2021-04-26 DIAGNOSIS — N20.0 URIC ACID KIDNEY STONE: ICD-10-CM

## 2021-04-27 RX ORDER — ALLOPURINOL 300 MG/1
300 TABLET ORAL DAILY
Qty: 90 TABLET | Refills: 1 | Status: SHIPPED | OUTPATIENT
Start: 2021-04-27

## 2021-04-28 DIAGNOSIS — I10 ESSENTIAL HYPERTENSION: ICD-10-CM

## 2021-04-28 DIAGNOSIS — Z76.0 MEDICATION REFILL: ICD-10-CM

## 2021-04-28 DIAGNOSIS — J30.2 SEASONAL ALLERGIC RHINITIS, UNSPECIFIED TRIGGER: ICD-10-CM

## 2021-04-28 RX ORDER — AMLODIPINE BESYLATE 5 MG/1
TABLET ORAL
Qty: 90 TABLET | Refills: 3 | Status: SHIPPED | OUTPATIENT
Start: 2021-04-28

## 2021-04-28 RX ORDER — FLUTICASONE PROPIONATE 50 MCG
SPRAY, SUSPENSION (ML) NASAL
Qty: 16 G | Refills: 1 | Status: SHIPPED | OUTPATIENT
Start: 2021-04-28

## 2021-04-28 RX ORDER — ATORVASTATIN CALCIUM 20 MG/1
TABLET, FILM COATED ORAL
Qty: 90 TABLET | Refills: 3 | Status: SHIPPED | OUTPATIENT
Start: 2021-04-28 | End: 2023-09-13

## 2021-05-14 ENCOUNTER — TELEPHONE (OUTPATIENT)
Dept: CARDIOLOGY | Facility: MEDICAL CENTER | Age: 61
End: 2021-05-14

## 2021-05-14 ENCOUNTER — NON-PROVIDER VISIT (OUTPATIENT)
Dept: CARDIOLOGY | Facility: MEDICAL CENTER | Age: 61
End: 2021-05-14
Attending: INTERNAL MEDICINE
Payer: COMMERCIAL

## 2021-05-14 DIAGNOSIS — I47.10 SVT (SUPRAVENTRICULAR TACHYCARDIA) (HCC): ICD-10-CM

## 2021-05-14 DIAGNOSIS — R00.2 PALPITATIONS: ICD-10-CM

## 2021-05-14 PROCEDURE — 93268 ECG RECORD/REVIEW: CPT | Performed by: INTERNAL MEDICINE

## 2021-05-14 NOTE — TELEPHONE ENCOUNTER
Patient enrolled in the 30 day [employer]io [employer] MCT Heart monitoring program per Leigh Bishop MD.  >In office hookup with Baseline transmitted  >Pending EOS.

## 2021-05-19 ENCOUNTER — OFFICE VISIT (OUTPATIENT)
Dept: PHYSICAL MEDICINE AND REHAB | Facility: MEDICAL CENTER | Age: 61
End: 2021-05-19
Payer: COMMERCIAL

## 2021-05-19 VITALS
HEART RATE: 91 BPM | OXYGEN SATURATION: 94 % | SYSTOLIC BLOOD PRESSURE: 140 MMHG | WEIGHT: 269.4 LBS | TEMPERATURE: 98.3 F | HEIGHT: 70 IN | BODY MASS INDEX: 38.57 KG/M2 | DIASTOLIC BLOOD PRESSURE: 90 MMHG

## 2021-05-19 DIAGNOSIS — G56.01 CARPAL TUNNEL SYNDROME OF RIGHT WRIST: ICD-10-CM

## 2021-05-19 DIAGNOSIS — M79.2 NEURALGIA: ICD-10-CM

## 2021-05-19 DIAGNOSIS — M77.01 MEDIAL EPICONDYLITIS OF RIGHT ELBOW: ICD-10-CM

## 2021-05-19 PROCEDURE — 76882 US LMTD JT/FCL EVL NVASC XTR: CPT | Performed by: PHYSICAL MEDICINE & REHABILITATION

## 2021-05-19 PROCEDURE — 99204 OFFICE O/P NEW MOD 45 MIN: CPT | Mod: 25 | Performed by: PHYSICAL MEDICINE & REHABILITATION

## 2021-05-19 ASSESSMENT — PAIN SCALES - GENERAL: PAINLEVEL: NO PAIN

## 2021-05-19 ASSESSMENT — PATIENT HEALTH QUESTIONNAIRE - PHQ9: CLINICAL INTERPRETATION OF PHQ2 SCORE: 0

## 2021-05-19 ASSESSMENT — FIBROSIS 4 INDEX: FIB4 SCORE: 1.54

## 2021-05-19 NOTE — PROGRESS NOTES
New patient note    Physiatry (physical medicine and  Rehabilitation), interventional spine and sports medicine    Date of service: See epic    Chief complaint:   Chief Complaint   Patient presents with   • New Patient     Hand numbness        Referring provider: Leigh Bishop M.D.     HISTORY    HPI: Jhonatan Fonseca 60 y.o.  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    HPI    The patient is left-handed.  Over the past 2 to 3 months he has had burning numbness and tingling sensation of the right hand mostly involving the thumb but also involving the first 3 digits.  Mostly the palmar aspect of the hand.  This is worse overnight.  This does wake him up at night.  Flick sign is positive.  He previously had symptoms in the left upper extremity but this is improved with conservative treatments.      He also has a separate pain in the medial aspect of the right elbow.  Mild to moderate intensity.  This is been happening over the past 2 to 3 months as well.  He is also been doing a home exercise program for this.    He is also tried chiropractic care.  He tried wearing a neutral wrist plan and his home exercise program over this time period as well.  This is under the direction of his PCP Dr. Bishop as well as with hand therapy.  There has been no significant improvement.        Medications tried include ibuprofen and tylenol.     Medical records review:  I reviewed the note from the referring provider Leigh Bishop M.D. including the note dated 3/29/2021 where the patient was seen for palpitations with a Zio patch ordered and an echocardiogram ordered.  Plan to follow-up.  Also with numbness and tingling of the right hand referred to hand therapy..     I also reviewed notes from hand therapy including from Nicol Amin OTR with the patient was having pain numbness and tingling  "mostly in the right hand in the first 3 digits.  Patient went to a chiropractor with no improvement.      ROS:   Chronic intermittent tinnitus  Red Flags ROS:   Fever, Chills, Sweats: Denies  Involuntary Weight Loss: Denies  Bladder Incontinence: Denies  Bowel Incontinence: denies  Saddle Anesthesia: Denies    All other systems reviewed and negative.       PMHx:   Past Medical History:   Diagnosis Date   • Anesthesia 08/13/2019    \"I was told I turned green on table during foot surgery about 20 years ago.\"   • Arthritis 08/13/2019    Left Knee, Right Hand    • Breath shortness 08/13/2019    Pt. states overweight & out of shape.   • Celiac artery aneurysm (HCC)    • Chickenpox    • Diabetes (HCC) 08/14/2020    Takes Metformin to control A1C but not actually diagnosed   • Dyslipidemia    • High cholesterol    • Hypertension    • Kidney stone    • Sleep apnea     CPAP USE   • Snoring     DX DK         Current Outpatient Medications on File Prior to Visit   Medication Sig Dispense Refill   • atorvastatin (LIPITOR) 20 MG Tab TAKE 1 TABLET BY MOUTH  DAILY 90 tablet 3   • fluticasone (FLONASE) 50 MCG/ACT nasal spray USE 1 SPRAY IN EACH NOSTRIL EVERY DAY 16 g 1   • amLODIPine (NORVASC) 5 MG Tab TAKE 1 TABLET BY MOUTH  DAILY 90 tablet 3   • allopurinol (ZYLOPRIM) 300 MG Tab Take 1 tablet by mouth every day. 90 tablet 1   • metFORMIN (GLUCOPHAGE) 500 MG Tab Take 1 tablet by mouth 2 Times a Day. 180 tablet 1   • tamsulosin (FLOMAX) 0.4 MG capsule Take 0.4 mg by mouth every day.     • loratadine (CLARITIN) 10 MG Tab Take 10 mg by mouth every evening.     • Omega-3 Fatty Acids (FISH OIL) 1000 MG Cap capsule Take 1,000 mg by mouth every evening.     • therapeutic multivitamin-minerals (THERAGRAN-M) Tab Take 1 Tab by mouth every day.     • aspirin EC (ECOTRIN) 81 MG Tablet Delayed Response Take 81 mg by mouth every day.       No current facility-administered medications on file prior to visit.        PSHx:   Past Surgical " History:   Procedure Laterality Date   • INCISION HERNIA REPAIR  8/19/2020    Procedure: REPAIR, HERNIA, INCISIONAL - W/MESH;  Surgeon: Elie Morales M.D.;  Location: SURGERY [facility];  Service: General   • AORTOFEMORAL BYPASS N/A 8/15/2019    Procedure: CREATION, bypass arterial, celiac artery aneurism repair;  Surgeon: Elie Morales M.D.;  Location: SURGERY [facility];  Service: General   • BONE SPUR EXCISION     • KNEE ARTHROSCOPY      repair micscus tear   • LITHOTRIPSY     • OTHER      Surgery for right shoulder dislocation and torn ligaments 1974.   • OTHER ORTHOPEDIC SURGERY      Left Foot Surgery x3 from broken foot in high school.   • OTHER ORTHOPEDIC SURGERY      Right Achilles Surgery       Family history   Family History   Problem Relation Age of Onset   • Cancer Brother         parathyroid   • Sleep Apnea Brother    • Diabetes Brother    • Sleep Apnea Brother    • Diabetes Father          Medications: reviewed on epic.   Outpatient Medications Marked as Taking for the 5/19/21 encounter (Office Visit) with Ranjit Yost M.D.   Medication Sig Dispense Refill   • atorvastatin (LIPITOR) 20 MG Tab TAKE 1 TABLET BY MOUTH  DAILY 90 tablet 3   • fluticasone (FLONASE) 50 MCG/ACT nasal spray USE 1 SPRAY IN EACH NOSTRIL EVERY DAY 16 g 1   • amLODIPine (NORVASC) 5 MG Tab TAKE 1 TABLET BY MOUTH  DAILY 90 tablet 3   • allopurinol (ZYLOPRIM) 300 MG Tab Take 1 tablet by mouth every day. 90 tablet 1   • metFORMIN (GLUCOPHAGE) 500 MG Tab Take 1 tablet by mouth 2 Times a Day. 180 tablet 1   • tamsulosin (FLOMAX) 0.4 MG capsule Take 0.4 mg by mouth every day.     • loratadine (CLARITIN) 10 MG Tab Take 10 mg by mouth every evening.     • Omega-3 Fatty Acids (FISH OIL) 1000 MG Cap capsule Take 1,000 mg by mouth every evening.     • therapeutic multivitamin-minerals (THERAGRAN-M) Tab Take 1 Tab by mouth every day.     • aspirin EC (ECOTRIN) 81 MG Tablet Delayed Response Take 81 mg by mouth every day.           Allergies:   Allergies   Allergen Reactions   • Lisinopril Hives   • Hydrochlorothiazide      Irritable       Social Hx:   Social History     Socioeconomic History   • Marital status:      Spouse name: Not on file   • Number of children: Not on file   • Years of education: Not on file   • Highest education level: Not on file   Occupational History   • Not on file   Tobacco Use   • Smoking status: Never Smoker   • Smokeless tobacco: Never Used   Vaping Use   • Vaping Use: Never used   Substance and Sexual Activity   • Alcohol use: Yes     Alcohol/week: 0.0 oz     Comment: 2/month   • Drug use: No   • Sexual activity: Yes     Partners: Female   Other Topics Concern   •  Service No   • Blood Transfusions No   • Caffeine Concern No   • Occupational Exposure No   • Hobby Hazards Yes   • Sleep Concern No   • Stress Concern No   • Weight Concern Yes   • Special Diet No   • Back Care No   • Exercise Yes   • Bike Helmet No   • Seat Belt Yes   • Self-Exams No   Social History Narrative   • Not on file     Social Determinants of Health     Financial Resource Strain:    • Difficulty of Paying Living Expenses:    Food Insecurity:    • Worried About Running Out of Food in the Last Year:    • Ran Out of Food in the Last Year:    Transportation Needs:    • Lack of Transportation (Medical):    • Lack of Transportation (Non-Medical):    Physical Activity:    • Days of Exercise per Week:    • Minutes of Exercise per Session:    Stress:    • Feeling of Stress :    Social Connections:    • Frequency of Communication with Friends and Family:    • Frequency of Social Gatherings with Friends and Family:    • Attends [employer] Services:    • Active Member of Clubs or Organizations:    • Attends Club or Organization Meetings:    • Marital Status:    Intimate Partner Violence:    • Fear of Current or Ex-Partner:    • Emotionally Abused:    • Physically Abused:    • Sexually Abused:          EXAMINATION     Physical Exam:  "  Vitals: /90 (BP Location: Right arm, Patient Position: Sitting, BP Cuff Size: Adult long)   Pulse 91   Temp 36.8 °C (98.3 °F) (Temporal)   Ht 1.778 m (5' 10\")   Wt 122 kg (269 lb 6.4 oz)   SpO2 94%     Constitutional:   Body Habitus: Body mass index is 38.66 kg/m².  Cooperation: Fully cooperates with exam  Appearance: Well-groomed, well-nourished, not disheveled     Eyes: No scleral icterus to suggest severe liver disease, no proptosis to suggest severe hyperthyroid    ENT -no obvious auditory deficits, no obvious tongue lesions, tongue midline, no facial droop     Skin -no rashes or lesions noted     Respiratory-  breathing comfortable on room air, no audible wheezing    Cardiovascular- capillary refills less than 2 seconds. No lower extremity edema is noted.     Gastrointestinal - no obvious abdominal masses, No tenderness to palpation in the abdomen    Psychiatric- alert and oriented ×3. Normal affect.       Musculoskeletal and Neuro -     right elbow exam  Inspection: No rashes, swelling or deformities. No swelling around the olecranon.  Palpation: Mild tenderness to palpation at the common flexor tendon.  No tenderness palpation to the lateral epicondyle,   olecranon, cubital fossa   Range of motion: Normal in the elbow in flexion and extension.  Special tests:  Resisted wrist extension (Cozens test): negative  Resisted wrist flexion: Mildly positive       Bilateral hands:   Inspection: No swelling,  Deformities or rashes. Symmetric appearing thenar and hyperthenar regions bilaterally.  Palpation no significant tenderness to palpation throughout the bilateral hands  Range of motion is within normal limits throughout bilateral hands, fingers and wrist.  Special tests:  Tinel's at the wrist over the median nerve positive right, negative left  Carpal tunnel compression: positive right, negative left  Phalen's test: positive right, negative left  Finkelstein's test: negative bilaterally  CMC grind test " negative bilaterally     Cervical spine   Inspection: No deformities of the skin over the cervical spine. No rashes or lesions.    full   active range of motion in all directions, without  pain      Spurling’s sign: negative bilaterally    No signs of muscular atrophy in bilateral upper extremities     No tenderness to palpation of the cervical spine        Key points for the international standards for neurological classification of spinal cord injury (ISNCSCI) to light touch.     Dermatome R L   C4 2 2   C5 2 2   C6 2 2   C7 2 2   C8 2 2   T1 2 2   T2 2 2                                     Motor Exam Upper Extremities   ? Myotome R L   Shoulder flexion C5 5 5   Elbow flexion C5 5 5   Wrist extension C6 5 5   Elbow extension C7 5 5   Finger flexion C8 5 5   Finger abduction T1 5 5     Reflexes  ?  R L   Biceps  2+ 2+   Brachioradialis  2+ 2+     Hutton’s sign negative bilaterally                MEDICAL DECISION MAKING    Medical records review: see under HPI section.     DATA    Labs:   Lab Results   Component Value Date/Time    SODIUM 141 12/28/2020 06:13 AM    POTASSIUM 3.8 12/28/2020 06:13 AM    CHLORIDE 104 12/28/2020 06:13 AM    CO2 30 12/28/2020 06:13 AM    ANION 7.0 12/28/2020 06:13 AM    GLUCOSE 94 12/28/2020 06:13 AM    BUN 17 12/28/2020 06:13 AM    CREATININE 0.80 12/28/2020 06:13 AM    CALCIUM 10.1 12/28/2020 06:13 AM    ASTSGOT 30 12/28/2020 06:13 AM    ALTSGPT 25 12/28/2020 06:13 AM    TBILIRUBIN 0.3 12/28/2020 06:13 AM    ALBUMIN 4.2 12/28/2020 06:13 AM    TOTPROTEIN 7.5 12/28/2020 06:13 AM    GLOBULIN 3.3 12/28/2020 06:13 AM    AGRATIO 1.3 12/28/2020 06:13 AM   ]    Lab Results   Component Value Date/Time    PROTHROMBTM 15.7 (H) 08/16/2019 04:40 AM    INR 1.22 (H) 08/16/2019 04:40 AM        Lab Results   Component Value Date/Time    WBC 7.7 12/28/2020 06:13 AM    RBC 5.02 12/28/2020 06:13 AM    HEMOGLOBIN 14.3 12/28/2020 06:13 AM    HEMATOCRIT 45.6 12/28/2020 06:13 AM    MCV 90.8 12/28/2020 06:13  AM    MCH 28.5 12/28/2020 06:13 AM    MCHC 31.4 (L) 12/28/2020 06:13 AM    MPV 11.0 12/28/2020 06:13 AM    NEUTSPOLYS 42.40 (L) 12/28/2020 06:13 AM    LYMPHOCYTES 42.00 (H) 12/28/2020 06:13 AM    MONOCYTES 11.00 12/28/2020 06:13 AM    EOSINOPHILS 3.40 12/28/2020 06:13 AM    BASOPHILS 0.90 12/28/2020 06:13 AM        Lab Results   Component Value Date/Time    HBA1C 5.8 (H) 12/28/2020 06:13 AM        Imaging:   I personally reviewed following images, these are my reads      IMAGING radiology reads. I reviewed the following radiology reads                                                                                               Diagnosis   Visit Diagnoses     ICD-10-CM   1. Neuralgia  M79.2   2. Carpal tunnel syndrome of right wrist with bifid structure  G56.01   3. Medial epicondylitis of right elbow  M77.01           ASSESSMENT AND PLAN:  Jhonatan Badillo Raymundo 60 y.o. male      Jhonatan was seen today for new patient.    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  Severe and diagnostic ultrasound.  Failed splinting, stretches, hand therapy, home exercise program.  Orders:  -     REFERRAL TO OUTPATIENT INTERVENTIONAL PAIN CLINIC  -     REFERRAL TO NEURODIAGNOSTICS (EEG,EP,EMG/NCS/DBS)    Medial epicondylitis of right elbow  Comments:  Mild, continue home exercise program  Orders:  -     REFERRAL TO NEURODIAGNOSTICS (EEG,EP,EMG/NCS/DBS)          Diagnostic workup:   5/19/2021 I performed a limited diagnostic ultrasound for the patient's neuralgia ICD M79.2. I performed a limited diagnostic ultrasound of the RIGHT  wrist including the median nerve to evaluate the cause of the patient's neuralgia. The median nerve which shows a bifid structure with a cross-sectional area of 27 mm² at the level of the carpal tunnel outlet and 9 mm² at the level of the pronator  quadratus. This is consistent with carpal tunnel syndrome.  The images were uploaded to the medical record.      I also ordered an EMG of the right upper extremity    Medications: The patient wishes to avoid any medications because of cognitive side effects.  Gabapentin was considered we will hold off on this for now.    Interventional program: I have ordered a right carpal tunnel injection plan for the next visit pending insurance approval      Follow-up: After the above diagnostic studies           Please note that this dictation was created using voice recognition software. I have made every reasonable attempt to correct obvious errors but there may be errors of grammar and content that I may have overlooked prior to finalization of this note.      Ranjit Yost MD  Physical Medicine and Rehabilitation  Interventional Spine and Sports Physiatry  [facility]           Leigh Simpson M.D.

## 2021-05-26 NOTE — TELEPHONE ENCOUNTER
Pt called and asked for Anjali. He states that he was told to call and let us know when he has an episode. Pt would like a call back to further discuss incident from today 5/26 at [phone]    Thank you

## 2021-05-27 ENCOUNTER — OFFICE VISIT (OUTPATIENT)
Dept: PHYSICAL MEDICINE AND REHAB | Facility: MEDICAL CENTER | Age: 61
End: 2021-05-27
Payer: COMMERCIAL

## 2021-05-27 VITALS
DIASTOLIC BLOOD PRESSURE: 86 MMHG | HEIGHT: 70 IN | WEIGHT: 268.3 LBS | BODY MASS INDEX: 38.41 KG/M2 | OXYGEN SATURATION: 95 % | TEMPERATURE: 98.1 F | SYSTOLIC BLOOD PRESSURE: 138 MMHG | HEART RATE: 95 BPM

## 2021-05-27 DIAGNOSIS — G56.01 CARPAL TUNNEL SYNDROME OF RIGHT WRIST: ICD-10-CM

## 2021-05-27 PROCEDURE — 20526 THER INJECTION CARP TUNNEL: CPT | Mod: RT | Performed by: PHYSICAL MEDICINE & REHABILITATION

## 2021-05-27 PROCEDURE — 76942 ECHO GUIDE FOR BIOPSY: CPT | Performed by: PHYSICAL MEDICINE & REHABILITATION

## 2021-05-27 RX ORDER — DEXAMETHASONE SODIUM PHOSPHATE 4 MG/ML
4 INJECTION, SOLUTION INTRA-ARTICULAR; INTRALESIONAL; INTRAMUSCULAR; INTRAVENOUS; SOFT TISSUE ONCE
Status: COMPLETED | OUTPATIENT
Start: 2021-05-27 | End: 2021-05-27

## 2021-05-27 RX ADMIN — DEXAMETHASONE SODIUM PHOSPHATE 4 MG: 4 INJECTION, SOLUTION INTRA-ARTICULAR; INTRALESIONAL; INTRAMUSCULAR; INTRAVENOUS; SOFT TISSUE at 16:16

## 2021-05-27 ASSESSMENT — FIBROSIS 4 INDEX: FIB4 SCORE: 1.54

## 2021-05-27 ASSESSMENT — PAIN SCALES - GENERAL: PAINLEVEL: 3=SLIGHT PAIN

## 2021-05-27 NOTE — PROCEDURES
Date of Service: 5/27/2021    Patient: Jhonatan Fonseca 60 y.o. male MRN: [id number]     Physician/s: Ranjit Yost MD    Pre-operative Diagnosis: RIGHT  carpal tunnel syndrome    Post-operative Diagnosis: RIGHT  carpal tunnel syndrome      Procedure: RIGHT  ultrasound-guided carpal tunnel injection    Description of procedure:    The risks, benefits, and alternatives of the procedure were reviewed and discussed with the patient.  Written informed consent was freely obtained. A pre-procedural time-out was conducted by the physician verifying patient’s identity, procedure to be performed, procedure site and side, and allergy verification. Appropriate equipment was determined to be in place for the procedure.     No sedation was used for this procedure.     In the office suite the patient was placed in a sitting position, and his RIGHT   hand/s were rested with the palm/s up on a sterile roman stand, and the skin was prepped and draped in the usual sterile fashion. Median nerve at the carpal tunnel was identified under ultrasound guidance. Also identified where the ulnar nerve, ulnar artery, radial artery to confirm the needle path would not be impacting the structures. Under ultrasound guidance with an in plane approach,  A 27g 1.5 inch needle was placed into skin and advanced adjacent so the needle path was deep to the median nerve. Following negative aspiration, a total of 1.5mL solution mixture was injected perineurally, containing approx 1mL of 1% Lidocaine with 0.5mL of 4mg/mL dexamethasone, 1mL of sterile saline. . The patient's skin was wiped with a 4x4 gauze, the area was cleansed with alcohol prep, and a bandaid was applied. There were no complications noted.     The images were uploaded to our secure system for permanent storage.     I advised the patient to get the EMG as previously ordered.  Follow-up after the EMG.    Ranjit Yost MD  Physical Medicine and Rehabilitation  Interventional Spine and Sports  Physiatry  [facility]

## 2022-06-03 ENCOUNTER — HOSPITAL ENCOUNTER (OUTPATIENT)
Dept: LAB | Facility: MEDICAL CENTER | Age: 62
End: 2022-06-03
Attending: FAMILY MEDICINE
Payer: COMMERCIAL

## 2022-06-03 LAB
ANION GAP SERPL CALC-SCNC: 10 MMOL/L (ref 7–16)
BUN SERPL-MCNC: 14 MG/DL (ref 8–22)
CALCIUM SERPL-MCNC: 9.9 MG/DL (ref 8.5–10.5)
CHLORIDE SERPL-SCNC: 106 MMOL/L (ref 96–112)
CO2 SERPL-SCNC: 27 MMOL/L (ref 20–33)
CREAT SERPL-MCNC: 0.79 MG/DL (ref 0.5–1.4)
EST. AVERAGE GLUCOSE BLD GHB EST-MCNC: 114 MG/DL
FASTING STATUS PATIENT QL REPORTED: NORMAL
GFR SERPLBLD CREATININE-BSD FMLA CKD-EPI: 100 ML/MIN/1.73 M 2
GLUCOSE SERPL-MCNC: 102 MG/DL (ref 65–99)
HBA1C MFR BLD: 5.6 % (ref 4–5.6)
POTASSIUM SERPL-SCNC: 4.1 MMOL/L (ref 3.6–5.5)
SODIUM SERPL-SCNC: 143 MMOL/L (ref 135–145)

## 2022-06-03 PROCEDURE — 36415 COLL VENOUS BLD VENIPUNCTURE: CPT

## 2022-06-03 PROCEDURE — 83036 HEMOGLOBIN GLYCOSYLATED A1C: CPT

## 2022-06-03 PROCEDURE — 80048 BASIC METABOLIC PNL TOTAL CA: CPT

## 2022-11-02 ENCOUNTER — OFFICE VISIT (OUTPATIENT)
Dept: SLEEP MEDICINE | Facility: MEDICAL CENTER | Age: 62
End: 2022-11-02
Payer: COMMERCIAL

## 2022-11-02 VITALS
HEART RATE: 70 BPM | WEIGHT: 273 LBS | OXYGEN SATURATION: 93 % | SYSTOLIC BLOOD PRESSURE: 132 MMHG | BODY MASS INDEX: 39.08 KG/M2 | DIASTOLIC BLOOD PRESSURE: 86 MMHG | HEIGHT: 70 IN | RESPIRATION RATE: 16 BRPM

## 2022-11-02 DIAGNOSIS — G47.33 OSA (OBSTRUCTIVE SLEEP APNEA): Primary | ICD-10-CM

## 2022-11-02 PROCEDURE — 99203 OFFICE O/P NEW LOW 30 MIN: CPT | Performed by: STUDENT IN AN ORGANIZED HEALTH CARE EDUCATION/TRAINING PROGRAM

## 2022-11-02 RX ORDER — METOPROLOL SUCCINATE 100 MG/1
TABLET, EXTENDED RELEASE ORAL
COMMUNITY
Start: 2022-09-20 | End: 2023-09-13

## 2022-11-02 ASSESSMENT — FIBROSIS 4 INDEX: FIB4 SCORE: 1.588235294117647059

## 2022-11-02 ASSESSMENT — PATIENT HEALTH QUESTIONNAIRE - PHQ9: CLINICAL INTERPRETATION OF PHQ2 SCORE: 0

## 2022-11-02 NOTE — PROGRESS NOTES
[facility] Consult Note     Date: 11/2/2022 / Time: 8:13 AM      Thank you for requesting a sleep medicine consultation on Jhonatan Fonseca at the sleep center. Presents today with the chief complaints of reestablishing care for DK management. He is referred by Elie Marcano M.D.  [address] [facility]  [address]  [place] [address] for evaluation and treatment of sleep apnea.    HISTORY OF PRESENT ILLNESS:     Jhonatan Fonseca is a 62 y.o. male with hypertension, obesity, hyperlipidemia, BPH, recurrent kidney stones, and obstructive sleep apnea on CPAP.  Presents to Sleep Clinic to reestablish care for management of obstructive sleep apnea.    Is a previous PMA patient.  He was last seen in our office in 2019.  Sleep study was a home sleep study in 2017 which showed severe obstructive sleep apnea with an overall AHI of 61.5 states he has been on CPAP since being diagnosed.    He did receive a new machine approximately 2 to 3 months ago.  This was related to the respiratory recall.  He has been using his machine regularly.  He did transfer over the memory card to his new machine.  Currently finds his mask and pressures comfortable.  States he does have extra supplies from his previous DME company.  He has not received a new order for supplies in some time.    Does have trouble with a dry mouth related to his CPAP usage.  He does find that Biotene helps slightly.  He does not always feel his immunity chamber.    He is following with urology regarding his nocturia.  Even with a sleep apnea controlled he is still awakening at night to use the restroom.  He generally can get back to sleep without issue but does feel that his sleep is interrupted due to his trouble with urination.    He generally sleeps in a recliner in the living room due to bilateral shoulder pain.  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    DME provider: Currently doesn't have one, was [facility]  "care  Device: Dreamstation 2   Mask: Nasal mask   Aerophagia: No   Snoring: No   Dry mouth: Yes  Leak: No   Skin irritation: No   Chin strap: No     As per supplemental questionnaire to be scanned or imported into chart:    [place] Sleepiness Score: 9    Sleep Schedule  Bedtime: Weekday & Weekend 1030pm  Wake time: Weekday & Weekend 5-6am  Sleep-onset latency: 30 min  Awakenings from sleep: 2-4  Difficulty falling back asleep: no   Bedroom partner: not currently   Naps: Yes, sometimes 30-60 min,      DAYTIME SYMPTOMS:   Excessive daytime sleepiness: No   Daytime fatigue: Yes  Difficulty concentrating: No   Memory problems: No   Irritability:No   Work/school performance issues: No   Sleepiness with driving: No   Caffeine/stimulant use: Yes  Alcohol use:Yes, How Many? Very rare     SLEEP RELATED SYMPTOMS  On CPAP  Snoring: No   Witnessed apnea or gasping/choking: No   Dry mouth or mouth breathing: No   Sweating: No   Teeth grinding/biting: No   Morning headaches: No   Refreshed Upon Awakening: No      SLEEP RELATED BEHAVIORS:  Parasomnias (walking, talking, eating, violence): No   Leg kicking: No   Restless legs - \"urge to move\": No   Nightmares: No  Recurrent: No   Dream enactment: No      NARCOLEPSY:  Cataplexy: No   Sleep paralysis: No   Sleep attacks: No   Hypnagogic/hypnopompic hallucinations: No     MEDICAL HISTORY  Past Medical History:   Diagnosis Date    Anesthesia 08/13/2019    \"I was told I turned green on table during foot surgery about 20 years ago.\"    Arthritis 08/13/2019    Left Knee, Right Hand     Breath shortness 08/13/2019    Pt. states overweight & out of shape.    Celiac artery aneurysm (HCC)     Chickenpox     Diabetes (HCC) 08/14/2020    Takes Metformin to control A1C but not actually diagnosed    Dyslipidemia     High cholesterol     Hypertension     Kidney stone     Sleep apnea     CPAP USE    Snoring     DX DK        SURGICAL HISTORY  Past Surgical History:   Procedure Laterality Date    " INCISION HERNIA REPAIR  8/19/2020    Procedure: REPAIR, HERNIA, INCISIONAL - W/MESH;  Surgeon: Elie Morales M.D.;  Location: SURGERY [facility];  Service: General    AORTOFEMORAL BYPASS N/A 8/15/2019    Procedure: CREATION, bypass arterial, celiac artery aneurism repair;  Surgeon: Elie Morales M.D.;  Location: SURGERY [facility];  Service: General    BONE SPUR EXCISION      KNEE ARTHROSCOPY      repair micscus tear    LITHOTRIPSY      OTHER      Surgery for right shoulder dislocation and torn ligaments 1974.    OTHER ORTHOPEDIC SURGERY      Left Foot Surgery x3 from broken foot in high school.    OTHER ORTHOPEDIC SURGERY      Right Achilles Surgery        FAMILY HISTORY  Family History   Problem Relation Age of Onset    Cancer Brother         parathyroid    Sleep Apnea Brother     Diabetes Brother     Sleep Apnea Brother     Diabetes Father        SOCIAL HISTORY  Social History     Socioeconomic History    Marital status:    Tobacco Use    Smoking status: Never    Smokeless tobacco: Never   Vaping Use    Vaping Use: Never used   Substance and Sexual Activity    Alcohol use: Yes     Comment: rarely    Drug use: No    Sexual activity: Yes     Partners: Female   Other Topics Concern     Service No    Blood Transfusions No    Caffeine Concern No    Occupational Exposure No    Hobby Hazards Yes    Sleep Concern No    Stress Concern No    Weight Concern Yes    Special Diet No    Back Care No    Exercise Yes    Bike Helmet No    Seat Belt Yes    Self-Exams No        Occupation: retired     CURRENT MEDICATIONS  Current Outpatient Medications   Medication Sig Dispense Refill    metFORMIN (GLUCOPHAGE) 500 MG Tab TAKE 1 TABLET BY MOUTH  TWICE DAILY 180 tablet 3    fluticasone (FLONASE) 50 MCG/ACT nasal spray USE 1 SPRAY IN EACH NOSTRIL EVERY DAY 16 g 1    amLODIPine (NORVASC) 5 MG Tab TAKE 1 TABLET BY MOUTH  DAILY 90 tablet 3    allopurinol (ZYLOPRIM) 300 MG Tab Take 1 tablet by mouth every day. 90  "tablet 1    tamsulosin (FLOMAX) 0.4 MG capsule Take 0.4 mg by mouth every day.      loratadine (CLARITIN) 10 MG Tab Take 10 mg by mouth every evening.      Omega-3 Fatty Acids (FISH OIL) 1000 MG Cap capsule Take 1,000 mg by mouth every evening.      therapeutic multivitamin-minerals (THERAGRAN-M) Tab Take 1 Tab by mouth every day.      aspirin EC (ECOTRIN) 81 MG Tablet Delayed Response Take 81 mg by mouth every day.      atorvastatin (LIPITOR) 20 MG Tab TAKE 1 TABLET BY MOUTH  DAILY (Patient not taking: Reported on 11/2/2022) 90 tablet 3     No current facility-administered medications for this visit.       REVIEW OF SYSTEMS  Constitutional: Denies fevers, Denies weight changes  Ears/Nose/Throat/Mouth: Denies nasal congestion or sore throat   Cardiovascular: Denies chest pain  Respiratory: Denies shortness of breath, Denies cough  Gastrointestinal/Hepatic: Denies nausea, vomiting  Sleep: see HPI    Physical Examination:  Vitals/ General Appearance:   Weight/BMI: Body mass index is 39.17 kg/m².  /86 (BP Location: Left arm, Patient Position: Sitting, BP Cuff Size: Large adult)   Pulse 70   Resp 16   Ht 1.778 m (5' 10\")   Wt 124 kg (273 lb)   SpO2 93%   Vitals:    11/02/22 0803   BP: 132/86   BP Location: Left arm   Patient Position: Sitting   BP Cuff Size: Large adult   Pulse: 70   Resp: 16   SpO2: 93%   Weight: 124 kg (273 lb)   Height: 1.778 m (5' 10\")       Pt. is alert and oriented to time, place and person. Cooperative and in no apparent distress.     Constitutional: Alert, no distress, well-groomed.  Skin: No rashes in visible areas.  Eye: Round. Conjunctiva clear, lids normal. No icterus.   ENT EXAM  Nasal alae/valves collapsible: No   Nasal septum deviation: Yes  Nasal turbinate hypertrophy: Left: Grade 1   Right: Grade 1  Hard palate narrow: Yes  Hard palate high: Yes  Soft palate/uvula (Mallampati score): 3  Tongue Scalloping: No   Retrognathia: No   Micrognathia: No   Cardiovascular:no " murmus/gallops/rubs, normal S1 and S2 heart sounds, regular rate and rhythm  Pulmonary:Clear to auscultation, No wheezes, No crackles.  Neurologic:Awake, alert and oriented x 3, Normal age appropriate gait, No involuntary motions.  Extremities: No clubbing, cyanosis, or edema       ASSESSMENT AND PLAN   Johnatan Fonseca is a 62 y.o. male with hypertension, obesity, hyperlipidemia, BPH, recurrent kidney stones, and obstructive sleep apnea on CPAP.  Presents to Sleep Clinic to reestablish care for management of obstructive sleep apnea.    1.  Obstructive sleep apnea  The medical record was reviewed.    Compliance data reviewed showing 93.3% usage > 4hours in last 30  days. Average AHI 3.2 events/hour. 90-95% pressure 8.7 CWP. Pt continues to use and benefit from machine.   Current settings are auto CPAP 5-15    Discussed that he may try XyliMelts to help with his dry mouth.  In addition may continue to use his humidifier more regularly which may help with dry mouth.    Patient does report that when he has nasal congestion makes it difficult to use his nasal mask.  Advised he may use a fullface mask for when he does have nasal congestion.    Voiced he would like to be set up with a new [employer] company.  Given [facility] regarding [facility] he does take his [employer] health plan.    PLAN:   -Order placed for mask and supplies   -Advised to reach out via MyChart with questions     Has been advised to continue the current CPAP, clean equipment frequently, and get new mask and supplies as allowed by insurance and DME. Recommend an earlier appointment, if significant treatment barriers develop.    The risks of untreated sleep apnea were discussed with the patient at length. Patients with DK are at increased risk of cardiovascular disease including coronary artery disease, systemic arterial hypertension, pulmonary arterial hypertension, cardiac arrythmias, and stroke. The patient was advised to avoid driving a motor  vehicle when drowsy.    Positive airway pressure will favorably impact many of the adverse conditions and effects provoked by DK.    Have advised the patient to follow up with the appropriate healthcare practitioners for all other medical problems and issues.    Return in about 1 year (around 11/2/2023).      2.  Regarding treatment of other past medical problems and general health maintenance,  Pt is urged to follow up with PCP.      Please note portions of this record was created using voice recognition software. I have made every reasonable attempt to correct obvious errors, but I expect that there are errors of grammar and possibly content I did not discover before finalizing the note.

## 2023-03-10 ENCOUNTER — HOSPITAL ENCOUNTER (OUTPATIENT)
Dept: LAB | Facility: MEDICAL CENTER | Age: 63
End: 2023-03-10
Attending: FAMILY MEDICINE
Payer: COMMERCIAL

## 2023-03-10 LAB
ALBUMIN SERPL BCP-MCNC: 3.9 G/DL (ref 3.2–4.9)
ALBUMIN/GLOB SERPL: 1.1 G/DL
ALP SERPL-CCNC: 138 U/L (ref 30–99)
ALT SERPL-CCNC: 15 U/L (ref 2–50)
ANION GAP SERPL CALC-SCNC: 9 MMOL/L (ref 7–16)
AST SERPL-CCNC: 13 U/L (ref 12–45)
BILIRUB SERPL-MCNC: 0.4 MG/DL (ref 0.1–1.5)
BUN SERPL-MCNC: 16 MG/DL (ref 8–22)
CALCIUM ALBUM COR SERPL-MCNC: 10.4 MG/DL (ref 8.5–10.5)
CALCIUM SERPL-MCNC: 10.3 MG/DL (ref 8.5–10.5)
CHLORIDE SERPL-SCNC: 104 MMOL/L (ref 96–112)
CHOLEST SERPL-MCNC: 154 MG/DL (ref 100–199)
CO2 SERPL-SCNC: 29 MMOL/L (ref 20–33)
CREAT SERPL-MCNC: 0.82 MG/DL (ref 0.5–1.4)
EST. AVERAGE GLUCOSE BLD GHB EST-MCNC: 123 MG/DL
FASTING STATUS PATIENT QL REPORTED: NORMAL
GFR SERPLBLD CREATININE-BSD FMLA CKD-EPI: 98 ML/MIN/1.73 M 2
GLOBULIN SER CALC-MCNC: 3.5 G/DL (ref 1.9–3.5)
GLUCOSE SERPL-MCNC: 99 MG/DL (ref 65–99)
HBA1C MFR BLD: 5.9 % (ref 4–5.6)
HDLC SERPL-MCNC: 60 MG/DL
LDLC SERPL CALC-MCNC: 84 MG/DL
POTASSIUM SERPL-SCNC: 4.5 MMOL/L (ref 3.6–5.5)
PROT SERPL-MCNC: 7.4 G/DL (ref 6–8.2)
PSA SERPL-MCNC: 1.5 NG/ML (ref 0–4)
SODIUM SERPL-SCNC: 142 MMOL/L (ref 135–145)
TRIGL SERPL-MCNC: 48 MG/DL (ref 0–149)

## 2023-03-10 PROCEDURE — 36415 COLL VENOUS BLD VENIPUNCTURE: CPT

## 2023-03-10 PROCEDURE — 80061 LIPID PANEL: CPT

## 2023-03-10 PROCEDURE — 80053 COMPREHEN METABOLIC PANEL: CPT

## 2023-03-10 PROCEDURE — 84153 ASSAY OF PSA TOTAL: CPT

## 2023-03-10 PROCEDURE — 83036 HEMOGLOBIN GLYCOSYLATED A1C: CPT

## 2023-05-08 ENCOUNTER — HOSPITAL ENCOUNTER (OUTPATIENT)
Facility: MEDICAL CENTER | Age: 63
End: 2023-05-08
Attending: UROLOGY
Payer: COMMERCIAL

## 2023-05-08 PROCEDURE — 83970 ASSAY OF PARATHORMONE: CPT

## 2023-05-08 PROCEDURE — 80048 BASIC METABOLIC PNL TOTAL CA: CPT

## 2023-05-09 LAB
ANION GAP SERPL CALC-SCNC: 16 MMOL/L (ref 7–16)
BUN SERPL-MCNC: 14 MG/DL (ref 8–22)
CALCIUM SERPL-MCNC: 10.5 MG/DL (ref 8.5–10.5)
CHLORIDE SERPL-SCNC: 104 MMOL/L (ref 96–112)
CO2 SERPL-SCNC: 23 MMOL/L (ref 20–33)
CREAT SERPL-MCNC: 0.78 MG/DL (ref 0.5–1.4)
GFR SERPLBLD CREATININE-BSD FMLA CKD-EPI: 100 ML/MIN/1.73 M 2
GLUCOSE SERPL-MCNC: 66 MG/DL (ref 65–99)
POTASSIUM SERPL-SCNC: 4.5 MMOL/L (ref 3.6–5.5)
PTH-INTACT SERPL-MCNC: 47.2 PG/ML (ref 14–72)
SODIUM SERPL-SCNC: 143 MMOL/L (ref 135–145)

## 2023-05-26 ENCOUNTER — HOSPITAL ENCOUNTER (OUTPATIENT)
Dept: LAB | Facility: MEDICAL CENTER | Age: 63
End: 2023-05-26
Attending: FAMILY MEDICINE
Payer: COMMERCIAL

## 2023-05-26 LAB
ANION GAP SERPL CALC-SCNC: 10 MMOL/L (ref 7–16)
BUN SERPL-MCNC: 15 MG/DL (ref 8–22)
CALCIUM SERPL-MCNC: 10.3 MG/DL (ref 8.5–10.5)
CHLORIDE SERPL-SCNC: 101 MMOL/L (ref 96–112)
CO2 SERPL-SCNC: 28 MMOL/L (ref 20–33)
CREAT SERPL-MCNC: 0.82 MG/DL (ref 0.5–1.4)
FASTING STATUS PATIENT QL REPORTED: NORMAL
GFR SERPLBLD CREATININE-BSD FMLA CKD-EPI: 98 ML/MIN/1.73 M 2
GLUCOSE SERPL-MCNC: 99 MG/DL (ref 65–99)
POTASSIUM SERPL-SCNC: 4.1 MMOL/L (ref 3.6–5.5)
SODIUM SERPL-SCNC: 139 MMOL/L (ref 135–145)

## 2023-05-26 PROCEDURE — 80048 BASIC METABOLIC PNL TOTAL CA: CPT

## 2023-05-26 PROCEDURE — 36415 COLL VENOUS BLD VENIPUNCTURE: CPT

## 2023-09-12 ENCOUNTER — HOSPITAL ENCOUNTER (OUTPATIENT)
Facility: MEDICAL CENTER | Age: 63
End: 2023-09-13
Attending: EMERGENCY MEDICINE | Admitting: INTERNAL MEDICINE
Payer: COMMERCIAL

## 2023-09-12 ENCOUNTER — APPOINTMENT (OUTPATIENT)
Dept: RADIOLOGY | Facility: MEDICAL CENTER | Age: 63
End: 2023-09-12
Attending: EMERGENCY MEDICINE
Payer: COMMERCIAL

## 2023-09-12 DIAGNOSIS — R07.2 PRECORDIAL CHEST PAIN: ICD-10-CM

## 2023-09-12 DIAGNOSIS — R07.9 CHEST PAIN, UNSPECIFIED TYPE: ICD-10-CM

## 2023-09-12 DIAGNOSIS — I16.0 HYPERTENSIVE URGENCY: ICD-10-CM

## 2023-09-12 LAB
ALBUMIN SERPL BCP-MCNC: 4.1 G/DL (ref 3.2–4.9)
ALBUMIN/GLOB SERPL: 1.2 G/DL
ALP SERPL-CCNC: 149 U/L (ref 30–99)
ALT SERPL-CCNC: 25 U/L (ref 2–50)
ANION GAP SERPL CALC-SCNC: 13 MMOL/L (ref 7–16)
AST SERPL-CCNC: 28 U/L (ref 12–45)
BASOPHILS # BLD AUTO: 0.6 % (ref 0–1.8)
BASOPHILS # BLD: 0.07 K/UL (ref 0–0.12)
BILIRUB SERPL-MCNC: 0.4 MG/DL (ref 0.1–1.5)
BUN SERPL-MCNC: 16 MG/DL (ref 8–22)
CALCIUM ALBUM COR SERPL-MCNC: 10.3 MG/DL (ref 8.5–10.5)
CALCIUM SERPL-MCNC: 10.4 MG/DL (ref 8.4–10.2)
CHLORIDE SERPL-SCNC: 103 MMOL/L (ref 96–112)
CO2 SERPL-SCNC: 24 MMOL/L (ref 20–33)
CREAT SERPL-MCNC: 0.87 MG/DL (ref 0.5–1.4)
EKG IMPRESSION: NORMAL
EOSINOPHIL # BLD AUTO: 0.16 K/UL (ref 0–0.51)
EOSINOPHIL NFR BLD: 1.3 % (ref 0–6.9)
ERYTHROCYTE [DISTWIDTH] IN BLOOD BY AUTOMATED COUNT: 48.8 FL (ref 35.9–50)
GFR SERPLBLD CREATININE-BSD FMLA CKD-EPI: 96 ML/MIN/1.73 M 2
GLOBULIN SER CALC-MCNC: 3.5 G/DL (ref 1.9–3.5)
GLUCOSE SERPL-MCNC: 107 MG/DL (ref 65–99)
HCT VFR BLD AUTO: 44.2 % (ref 42–52)
HGB BLD-MCNC: 14.4 G/DL (ref 14–18)
IMM GRANULOCYTES # BLD AUTO: 0.04 K/UL (ref 0–0.11)
IMM GRANULOCYTES NFR BLD AUTO: 0.3 % (ref 0–0.9)
LYMPHOCYTES # BLD AUTO: 3.38 K/UL (ref 1–4.8)
LYMPHOCYTES NFR BLD: 27.6 % (ref 22–41)
MCH RBC QN AUTO: 29 PG (ref 27–33)
MCHC RBC AUTO-ENTMCNC: 32.6 G/DL (ref 32.3–36.5)
MCV RBC AUTO: 89.1 FL (ref 81.4–97.8)
MONOCYTES # BLD AUTO: 0.98 K/UL (ref 0–0.85)
MONOCYTES NFR BLD AUTO: 8 % (ref 0–13.4)
NEUTROPHILS # BLD AUTO: 7.6 K/UL (ref 1.82–7.42)
NEUTROPHILS NFR BLD: 62.2 % (ref 44–72)
NRBC # BLD AUTO: 0 K/UL
NRBC BLD-RTO: 0 /100 WBC (ref 0–0.2)
PLATELET # BLD AUTO: 239 K/UL (ref 164–446)
PMV BLD AUTO: 10.4 FL (ref 9–12.9)
POTASSIUM SERPL-SCNC: 3.7 MMOL/L (ref 3.6–5.5)
PROT SERPL-MCNC: 7.6 G/DL (ref 6–8.2)
RBC # BLD AUTO: 4.96 M/UL (ref 4.7–6.1)
SODIUM SERPL-SCNC: 140 MMOL/L (ref 135–145)
TROPONIN T SERPL-MCNC: 41 NG/L (ref 6–19)
WBC # BLD AUTO: 12.2 K/UL (ref 4.8–10.8)

## 2023-09-12 PROCEDURE — 80053 COMPREHEN METABOLIC PANEL: CPT

## 2023-09-12 PROCEDURE — G0378 HOSPITAL OBSERVATION PER HR: HCPCS

## 2023-09-12 PROCEDURE — 36415 COLL VENOUS BLD VENIPUNCTURE: CPT

## 2023-09-12 PROCEDURE — A9270 NON-COVERED ITEM OR SERVICE: HCPCS | Performed by: EMERGENCY MEDICINE

## 2023-09-12 PROCEDURE — 99223 1ST HOSP IP/OBS HIGH 75: CPT | Performed by: INTERNAL MEDICINE

## 2023-09-12 PROCEDURE — 99285 EMERGENCY DEPT VISIT HI MDM: CPT

## 2023-09-12 PROCEDURE — 71045 X-RAY EXAM CHEST 1 VIEW: CPT

## 2023-09-12 PROCEDURE — 93005 ELECTROCARDIOGRAM TRACING: CPT | Performed by: EMERGENCY MEDICINE

## 2023-09-12 PROCEDURE — 84484 ASSAY OF TROPONIN QUANT: CPT

## 2023-09-12 PROCEDURE — 93005 ELECTROCARDIOGRAM TRACING: CPT

## 2023-09-12 PROCEDURE — 85025 COMPLETE CBC W/AUTO DIFF WBC: CPT

## 2023-09-12 PROCEDURE — 700102 HCHG RX REV CODE 250 W/ 637 OVERRIDE(OP): Performed by: EMERGENCY MEDICINE

## 2023-09-12 RX ORDER — ASPIRIN 81 MG/1
324 TABLET, CHEWABLE ORAL DAILY
Status: DISCONTINUED | OUTPATIENT
Start: 2023-09-13 | End: 2023-09-13

## 2023-09-12 RX ORDER — BISACODYL 10 MG
10 SUPPOSITORY, RECTAL RECTAL
Status: DISCONTINUED | OUTPATIENT
Start: 2023-09-12 | End: 2023-09-13 | Stop reason: HOSPADM

## 2023-09-12 RX ORDER — ONDANSETRON 2 MG/ML
4 INJECTION INTRAMUSCULAR; INTRAVENOUS EVERY 4 HOURS PRN
Status: DISCONTINUED | OUTPATIENT
Start: 2023-09-12 | End: 2023-09-13 | Stop reason: HOSPADM

## 2023-09-12 RX ORDER — AMOXICILLIN 250 MG
2 CAPSULE ORAL 2 TIMES DAILY
Status: DISCONTINUED | OUTPATIENT
Start: 2023-09-12 | End: 2023-09-13 | Stop reason: HOSPADM

## 2023-09-12 RX ORDER — PROMETHAZINE HYDROCHLORIDE 25 MG/1
12.5-25 TABLET ORAL EVERY 4 HOURS PRN
Status: DISCONTINUED | OUTPATIENT
Start: 2023-09-12 | End: 2023-09-13 | Stop reason: HOSPADM

## 2023-09-12 RX ORDER — ONDANSETRON 4 MG/1
4 TABLET, ORALLY DISINTEGRATING ORAL EVERY 4 HOURS PRN
Status: DISCONTINUED | OUTPATIENT
Start: 2023-09-12 | End: 2023-09-13 | Stop reason: HOSPADM

## 2023-09-12 RX ORDER — PROMETHAZINE HYDROCHLORIDE 25 MG/1
12.5-25 SUPPOSITORY RECTAL EVERY 4 HOURS PRN
Status: DISCONTINUED | OUTPATIENT
Start: 2023-09-12 | End: 2023-09-13 | Stop reason: HOSPADM

## 2023-09-12 RX ORDER — METOPROLOL SUCCINATE 100 MG/1
100 TABLET, EXTENDED RELEASE ORAL
Status: DISCONTINUED | OUTPATIENT
Start: 2023-09-13 | End: 2023-09-13

## 2023-09-12 RX ORDER — TAMSULOSIN HYDROCHLORIDE 0.4 MG/1
0.4 CAPSULE ORAL EVERY EVENING
Status: DISCONTINUED | OUTPATIENT
Start: 2023-09-13 | End: 2023-09-13 | Stop reason: HOSPADM

## 2023-09-12 RX ORDER — NITROGLYCERIN 0.4 MG/1
0.4 TABLET SUBLINGUAL ONCE
Status: COMPLETED | OUTPATIENT
Start: 2023-09-12 | End: 2023-09-12

## 2023-09-12 RX ORDER — POLYETHYLENE GLYCOL 3350 17 G/17G
1 POWDER, FOR SOLUTION ORAL
Status: DISCONTINUED | OUTPATIENT
Start: 2023-09-12 | End: 2023-09-13 | Stop reason: HOSPADM

## 2023-09-12 RX ORDER — ASPIRIN 81 MG/1
324 TABLET, CHEWABLE ORAL ONCE
Status: COMPLETED | OUTPATIENT
Start: 2023-09-12 | End: 2023-09-12

## 2023-09-12 RX ORDER — NITROGLYCERIN 0.4 MG/1
0.4 TABLET SUBLINGUAL
Status: DISCONTINUED | OUTPATIENT
Start: 2023-09-12 | End: 2023-09-13 | Stop reason: HOSPADM

## 2023-09-12 RX ORDER — HYDRALAZINE HYDROCHLORIDE 20 MG/ML
10 INJECTION INTRAMUSCULAR; INTRAVENOUS EVERY 4 HOURS PRN
Status: DISCONTINUED | OUTPATIENT
Start: 2023-09-12 | End: 2023-09-13 | Stop reason: HOSPADM

## 2023-09-12 RX ORDER — ASPIRIN 325 MG
325 TABLET ORAL DAILY
Status: DISCONTINUED | OUTPATIENT
Start: 2023-09-13 | End: 2023-09-13

## 2023-09-12 RX ORDER — ALLOPURINOL 300 MG/1
300 TABLET ORAL EVERY EVENING
Status: DISCONTINUED | OUTPATIENT
Start: 2023-09-13 | End: 2023-09-13 | Stop reason: HOSPADM

## 2023-09-12 RX ORDER — ACETAMINOPHEN 325 MG/1
650 TABLET ORAL EVERY 6 HOURS PRN
Status: DISCONTINUED | OUTPATIENT
Start: 2023-09-12 | End: 2023-09-13 | Stop reason: HOSPADM

## 2023-09-12 RX ORDER — ASPIRIN 300 MG/1
300 SUPPOSITORY RECTAL ONCE
Status: COMPLETED | OUTPATIENT
Start: 2023-09-12 | End: 2023-09-12

## 2023-09-12 RX ORDER — ASPIRIN 300 MG/1
300 SUPPOSITORY RECTAL DAILY
Status: DISCONTINUED | OUTPATIENT
Start: 2023-09-13 | End: 2023-09-13

## 2023-09-12 RX ORDER — AMLODIPINE BESYLATE 5 MG/1
5 TABLET ORAL DAILY
Status: DISCONTINUED | OUTPATIENT
Start: 2023-09-13 | End: 2023-09-13 | Stop reason: HOSPADM

## 2023-09-12 RX ORDER — PROCHLORPERAZINE EDISYLATE 5 MG/ML
5-10 INJECTION INTRAMUSCULAR; INTRAVENOUS EVERY 4 HOURS PRN
Status: DISCONTINUED | OUTPATIENT
Start: 2023-09-12 | End: 2023-09-13 | Stop reason: HOSPADM

## 2023-09-12 RX ADMIN — NITROGLYCERIN 0.4 MG: 0.4 TABLET, ORALLY DISINTEGRATING SUBLINGUAL at 23:35

## 2023-09-12 RX ADMIN — ASPIRIN 81 MG 324 MG: 81 TABLET ORAL at 23:35

## 2023-09-13 ENCOUNTER — APPOINTMENT (OUTPATIENT)
Dept: CARDIOLOGY | Facility: MEDICAL CENTER | Age: 63
End: 2023-09-13
Attending: INTERNAL MEDICINE
Payer: COMMERCIAL

## 2023-09-13 ENCOUNTER — HOSPITAL ENCOUNTER (INPATIENT)
Facility: MEDICAL CENTER | Age: 63
LOS: 2 days | DRG: 247 | End: 2023-09-15
Attending: INTERNAL MEDICINE | Admitting: INTERNAL MEDICINE
Payer: COMMERCIAL

## 2023-09-13 VITALS
SYSTOLIC BLOOD PRESSURE: 123 MMHG | TEMPERATURE: 97.5 F | HEIGHT: 70 IN | RESPIRATION RATE: 21 BRPM | HEART RATE: 70 BPM | BODY MASS INDEX: 37.5 KG/M2 | OXYGEN SATURATION: 96 % | DIASTOLIC BLOOD PRESSURE: 85 MMHG | WEIGHT: 261.91 LBS

## 2023-09-13 DIAGNOSIS — I21.4 NSTEMI (NON-ST ELEVATED MYOCARDIAL INFARCTION) (HCC): ICD-10-CM

## 2023-09-13 PROBLEM — M10.9 GOUT: Status: ACTIVE | Noted: 2023-09-13

## 2023-09-13 PROBLEM — D72.829 LEUKOCYTOSIS: Status: ACTIVE | Noted: 2023-09-13

## 2023-09-13 LAB
ANION GAP SERPL CALC-SCNC: 9 MMOL/L (ref 7–16)
APTT PPP: 28.3 SEC (ref 24.7–36)
BUN SERPL-MCNC: 16 MG/DL (ref 8–22)
CALCIUM SERPL-MCNC: 10.1 MG/DL (ref 8.4–10.2)
CHLORIDE SERPL-SCNC: 105 MMOL/L (ref 96–112)
CHOLEST SERPL-MCNC: 135 MG/DL (ref 100–199)
CO2 SERPL-SCNC: 25 MMOL/L (ref 20–33)
CREAT SERPL-MCNC: 0.79 MG/DL (ref 0.5–1.4)
EKG IMPRESSION: NORMAL
ERYTHROCYTE [DISTWIDTH] IN BLOOD BY AUTOMATED COUNT: 48.1 FL (ref 35.9–50)
GFR SERPLBLD CREATININE-BSD FMLA CKD-EPI: 99 ML/MIN/1.73 M 2
GLUCOSE SERPL-MCNC: 110 MG/DL (ref 65–99)
HCT VFR BLD AUTO: 41.9 % (ref 42–52)
HDLC SERPL-MCNC: 52 MG/DL
HGB BLD-MCNC: 13.8 G/DL (ref 14–18)
INR PPP: 1.09 (ref 0.87–1.13)
LDLC SERPL CALC-MCNC: 72 MG/DL
LIPASE SERPL-CCNC: 15 U/L (ref 11–82)
LV EJECT FRACT  99904: 65
MCH RBC QN AUTO: 29 PG (ref 27–33)
MCHC RBC AUTO-ENTMCNC: 32.9 G/DL (ref 32.3–36.5)
MCV RBC AUTO: 88 FL (ref 81.4–97.8)
PLATELET # BLD AUTO: 235 K/UL (ref 164–446)
PMV BLD AUTO: 10.4 FL (ref 9–12.9)
POTASSIUM SERPL-SCNC: 4 MMOL/L (ref 3.6–5.5)
PROTHROMBIN TIME: 14.7 SEC (ref 12–14.6)
RBC # BLD AUTO: 4.76 M/UL (ref 4.7–6.1)
SODIUM SERPL-SCNC: 139 MMOL/L (ref 135–145)
TRIGL SERPL-MCNC: 53 MG/DL (ref 0–149)
TROPONIN T SERPL-MCNC: 133 NG/L (ref 6–19)
TROPONIN T SERPL-MCNC: 188 NG/L (ref 6–19)
TROPONIN T SERPL-MCNC: 352 NG/L (ref 6–19)
TROPONIN T SERPL-MCNC: 388 NG/L (ref 6–19)
TROPONIN T SERPL-MCNC: 593 NG/L (ref 6–19)
UFH PPP CHRO-ACNC: 0.16 IU/ML
UFH PPP CHRO-ACNC: <0.1 IU/ML
WBC # BLD AUTO: 10.2 K/UL (ref 4.8–10.8)

## 2023-09-13 PROCEDURE — 80048 BASIC METABOLIC PNL TOTAL CA: CPT

## 2023-09-13 PROCEDURE — 93010 ELECTROCARDIOGRAM REPORT: CPT | Performed by: INTERNAL MEDICINE

## 2023-09-13 PROCEDURE — 700105 HCHG RX REV CODE 258: Performed by: INTERNAL MEDICINE

## 2023-09-13 PROCEDURE — 93306 TTE W/DOPPLER COMPLETE: CPT

## 2023-09-13 PROCEDURE — 83690 ASSAY OF LIPASE: CPT

## 2023-09-13 PROCEDURE — 84484 ASSAY OF TROPONIN QUANT: CPT | Mod: 91

## 2023-09-13 PROCEDURE — G0378 HOSPITAL OBSERVATION PER HR: HCPCS

## 2023-09-13 PROCEDURE — 85520 HEPARIN ASSAY: CPT

## 2023-09-13 PROCEDURE — 85520 HEPARIN ASSAY: CPT | Mod: 91

## 2023-09-13 PROCEDURE — 700102 HCHG RX REV CODE 250 W/ 637 OVERRIDE(OP): Performed by: INTERNAL MEDICINE

## 2023-09-13 PROCEDURE — A9270 NON-COVERED ITEM OR SERVICE: HCPCS | Performed by: INTERNAL MEDICINE

## 2023-09-13 PROCEDURE — 93306 TTE W/DOPPLER COMPLETE: CPT | Mod: 26 | Performed by: INTERNAL MEDICINE

## 2023-09-13 PROCEDURE — 99222 1ST HOSP IP/OBS MODERATE 55: CPT | Performed by: INTERNAL MEDICINE

## 2023-09-13 PROCEDURE — 93005 ELECTROCARDIOGRAM TRACING: CPT | Mod: 77,XE | Performed by: INTERNAL MEDICINE

## 2023-09-13 PROCEDURE — 85730 THROMBOPLASTIN TIME PARTIAL: CPT

## 2023-09-13 PROCEDURE — 85027 COMPLETE CBC AUTOMATED: CPT

## 2023-09-13 PROCEDURE — 96366 THER/PROPH/DIAG IV INF ADDON: CPT

## 2023-09-13 PROCEDURE — 96375 TX/PRO/DX INJ NEW DRUG ADDON: CPT

## 2023-09-13 PROCEDURE — 80061 LIPID PANEL: CPT

## 2023-09-13 PROCEDURE — 93005 ELECTROCARDIOGRAM TRACING: CPT | Performed by: INTERNAL MEDICINE

## 2023-09-13 PROCEDURE — 93005 ELECTROCARDIOGRAM TRACING: CPT | Mod: XE | Performed by: INTERNAL MEDICINE

## 2023-09-13 PROCEDURE — 96374 THER/PROPH/DIAG INJ IV PUSH: CPT | Mod: XU

## 2023-09-13 PROCEDURE — 96365 THER/PROPH/DIAG IV INF INIT: CPT

## 2023-09-13 PROCEDURE — 700111 HCHG RX REV CODE 636 W/ 250 OVERRIDE (IP): Mod: JZ | Performed by: INTERNAL MEDICINE

## 2023-09-13 PROCEDURE — 36415 COLL VENOUS BLD VENIPUNCTURE: CPT

## 2023-09-13 PROCEDURE — 85610 PROTHROMBIN TIME: CPT

## 2023-09-13 RX ORDER — ASPIRIN 81 MG/1
324 TABLET, CHEWABLE ORAL DAILY
Status: CANCELLED | OUTPATIENT
Start: 2023-09-14

## 2023-09-13 RX ORDER — ASPIRIN 300 MG/1
300 SUPPOSITORY RECTAL DAILY
Status: CANCELLED | OUTPATIENT
Start: 2023-09-14

## 2023-09-13 RX ORDER — ONDANSETRON 2 MG/ML
4 INJECTION INTRAMUSCULAR; INTRAVENOUS EVERY 4 HOURS PRN
Status: DISCONTINUED | OUTPATIENT
Start: 2023-09-13 | End: 2023-09-15 | Stop reason: HOSPADM

## 2023-09-13 RX ORDER — ASPIRIN 81 MG/1
324 TABLET, CHEWABLE ORAL DAILY
Status: DISCONTINUED | OUTPATIENT
Start: 2023-09-14 | End: 2023-09-14

## 2023-09-13 RX ORDER — POLYETHYLENE GLYCOL 3350 17 G/17G
1 POWDER, FOR SOLUTION ORAL
Status: DISCONTINUED | OUTPATIENT
Start: 2023-09-13 | End: 2023-09-15 | Stop reason: HOSPADM

## 2023-09-13 RX ORDER — ONDANSETRON 4 MG/1
4 TABLET, ORALLY DISINTEGRATING ORAL EVERY 4 HOURS PRN
Status: CANCELLED | OUTPATIENT
Start: 2023-09-13

## 2023-09-13 RX ORDER — HYDRALAZINE HYDROCHLORIDE 20 MG/ML
10 INJECTION INTRAMUSCULAR; INTRAVENOUS EVERY 4 HOURS PRN
Status: DISCONTINUED | OUTPATIENT
Start: 2023-09-13 | End: 2023-09-15 | Stop reason: HOSPADM

## 2023-09-13 RX ORDER — ASPIRIN 325 MG
325 TABLET ORAL DAILY
Status: DISCONTINUED | OUTPATIENT
Start: 2023-09-13 | End: 2023-09-13 | Stop reason: HOSPADM

## 2023-09-13 RX ORDER — HEPARIN SODIUM 5000 [USP'U]/100ML
0-30 INJECTION, SOLUTION INTRAVENOUS CONTINUOUS
Status: DISCONTINUED | OUTPATIENT
Start: 2023-09-13 | End: 2023-09-14

## 2023-09-13 RX ORDER — HEPARIN SODIUM 1000 [USP'U]/ML
4000 INJECTION, SOLUTION INTRAVENOUS; SUBCUTANEOUS ONCE
Status: COMPLETED | OUTPATIENT
Start: 2023-09-13 | End: 2023-09-13

## 2023-09-13 RX ORDER — ALLOPURINOL 300 MG/1
300 TABLET ORAL EVERY EVENING
Status: CANCELLED | OUTPATIENT
Start: 2023-09-13

## 2023-09-13 RX ORDER — POLYETHYLENE GLYCOL 3350 17 G/17G
1 POWDER, FOR SOLUTION ORAL
Status: CANCELLED | OUTPATIENT
Start: 2023-09-13

## 2023-09-13 RX ORDER — AMLODIPINE BESYLATE 5 MG/1
5 TABLET ORAL DAILY
Status: CANCELLED | OUTPATIENT
Start: 2023-09-14

## 2023-09-13 RX ORDER — ASPIRIN 300 MG/1
300 SUPPOSITORY RECTAL DAILY
Status: DISCONTINUED | OUTPATIENT
Start: 2023-09-13 | End: 2023-09-13 | Stop reason: HOSPADM

## 2023-09-13 RX ORDER — PROMETHAZINE HYDROCHLORIDE 25 MG/1
12.5-25 TABLET ORAL EVERY 4 HOURS PRN
Status: DISCONTINUED | OUTPATIENT
Start: 2023-09-13 | End: 2023-09-15 | Stop reason: HOSPADM

## 2023-09-13 RX ORDER — HEPARIN SODIUM 5000 [USP'U]/100ML
0-30 INJECTION, SOLUTION INTRAVENOUS CONTINUOUS
Status: CANCELLED | OUTPATIENT
Start: 2023-09-13

## 2023-09-13 RX ORDER — HEPARIN SODIUM 1000 [USP'U]/ML
2000 INJECTION, SOLUTION INTRAVENOUS; SUBCUTANEOUS PRN
Status: DISCONTINUED | OUTPATIENT
Start: 2023-09-13 | End: 2023-09-13 | Stop reason: HOSPADM

## 2023-09-13 RX ORDER — ASPIRIN 300 MG/1
300 SUPPOSITORY RECTAL DAILY
Status: DISCONTINUED | OUTPATIENT
Start: 2023-09-14 | End: 2023-09-14

## 2023-09-13 RX ORDER — ACETAMINOPHEN 325 MG/1
650 TABLET ORAL EVERY 6 HOURS PRN
Status: CANCELLED | OUTPATIENT
Start: 2023-09-13

## 2023-09-13 RX ORDER — PROMETHAZINE HYDROCHLORIDE 25 MG/1
12.5-25 TABLET ORAL EVERY 4 HOURS PRN
Status: CANCELLED | OUTPATIENT
Start: 2023-09-13

## 2023-09-13 RX ORDER — AMOXICILLIN 250 MG
2 CAPSULE ORAL 2 TIMES DAILY
Status: CANCELLED | OUTPATIENT
Start: 2023-09-13

## 2023-09-13 RX ORDER — HEPARIN SODIUM 5000 [USP'U]/100ML
0-30 INJECTION, SOLUTION INTRAVENOUS CONTINUOUS
Status: DISCONTINUED | OUTPATIENT
Start: 2023-09-13 | End: 2023-09-13 | Stop reason: HOSPADM

## 2023-09-13 RX ORDER — AMOXICILLIN 250 MG
2 CAPSULE ORAL 2 TIMES DAILY
Status: DISCONTINUED | OUTPATIENT
Start: 2023-09-13 | End: 2023-09-15 | Stop reason: HOSPADM

## 2023-09-13 RX ORDER — ASPIRIN 325 MG
325 TABLET ORAL DAILY
Status: DISCONTINUED | OUTPATIENT
Start: 2023-09-14 | End: 2023-09-14

## 2023-09-13 RX ORDER — PROMETHAZINE HYDROCHLORIDE 12.5 MG/1
12.5-25 SUPPOSITORY RECTAL EVERY 4 HOURS PRN
Status: DISCONTINUED | OUTPATIENT
Start: 2023-09-13 | End: 2023-09-15 | Stop reason: HOSPADM

## 2023-09-13 RX ORDER — BISACODYL 10 MG
10 SUPPOSITORY, RECTAL RECTAL
Status: CANCELLED | OUTPATIENT
Start: 2023-09-13

## 2023-09-13 RX ORDER — NITROGLYCERIN 0.4 MG/1
0.4 TABLET SUBLINGUAL
Status: CANCELLED | OUTPATIENT
Start: 2023-09-13

## 2023-09-13 RX ORDER — AMLODIPINE BESYLATE 5 MG/1
5 TABLET ORAL DAILY
Status: DISCONTINUED | OUTPATIENT
Start: 2023-09-14 | End: 2023-09-15 | Stop reason: HOSPADM

## 2023-09-13 RX ORDER — TAMSULOSIN HYDROCHLORIDE 0.4 MG/1
0.4 CAPSULE ORAL EVERY EVENING
Status: CANCELLED | OUTPATIENT
Start: 2023-09-13

## 2023-09-13 RX ORDER — BISACODYL 10 MG
10 SUPPOSITORY, RECTAL RECTAL
Status: DISCONTINUED | OUTPATIENT
Start: 2023-09-13 | End: 2023-09-15 | Stop reason: HOSPADM

## 2023-09-13 RX ORDER — TRIAMTERENE/HYDROCHLOROTHIAZID 37.5-25 MG
1 TABLET ORAL EVERY EVENING
Status: ON HOLD | COMMUNITY
End: 2023-09-15

## 2023-09-13 RX ORDER — PROCHLORPERAZINE EDISYLATE 5 MG/ML
5-10 INJECTION INTRAMUSCULAR; INTRAVENOUS EVERY 4 HOURS PRN
Status: CANCELLED | OUTPATIENT
Start: 2023-09-13

## 2023-09-13 RX ORDER — IBUPROFEN 200 MG
400 TABLET ORAL EVERY 6 HOURS PRN
Status: ON HOLD | COMMUNITY
End: 2023-09-15

## 2023-09-13 RX ORDER — ACETAMINOPHEN 325 MG/1
650 TABLET ORAL EVERY 6 HOURS PRN
Status: DISCONTINUED | OUTPATIENT
Start: 2023-09-13 | End: 2023-09-15 | Stop reason: HOSPADM

## 2023-09-13 RX ORDER — PROCHLORPERAZINE EDISYLATE 5 MG/ML
5-10 INJECTION INTRAMUSCULAR; INTRAVENOUS EVERY 4 HOURS PRN
Status: DISCONTINUED | OUTPATIENT
Start: 2023-09-13 | End: 2023-09-15 | Stop reason: HOSPADM

## 2023-09-13 RX ORDER — FLECAINIDE ACETATE 100 MG/1
100 TABLET ORAL 2 TIMES DAILY
Status: ON HOLD | COMMUNITY
End: 2023-09-15

## 2023-09-13 RX ORDER — ATORVASTATIN CALCIUM 40 MG/1
40 TABLET, FILM COATED ORAL EVERY EVENING
Status: DISCONTINUED | OUTPATIENT
Start: 2023-09-13 | End: 2023-09-13 | Stop reason: HOSPADM

## 2023-09-13 RX ORDER — ASPIRIN 325 MG
325 TABLET ORAL DAILY
Status: CANCELLED | OUTPATIENT
Start: 2023-09-14

## 2023-09-13 RX ORDER — HEPARIN SODIUM 1000 [USP'U]/ML
2000 INJECTION, SOLUTION INTRAVENOUS; SUBCUTANEOUS PRN
Status: DISCONTINUED | OUTPATIENT
Start: 2023-09-13 | End: 2023-09-14

## 2023-09-13 RX ORDER — ATORVASTATIN CALCIUM 40 MG/1
40 TABLET, FILM COATED ORAL EVERY EVENING
Status: CANCELLED | OUTPATIENT
Start: 2023-09-13

## 2023-09-13 RX ORDER — ALLOPURINOL 300 MG/1
300 TABLET ORAL EVERY EVENING
Status: DISCONTINUED | OUTPATIENT
Start: 2023-09-13 | End: 2023-09-15 | Stop reason: HOSPADM

## 2023-09-13 RX ORDER — SODIUM CHLORIDE 9 MG/ML
INJECTION, SOLUTION INTRAVENOUS CONTINUOUS
Status: DISCONTINUED | OUTPATIENT
Start: 2023-09-13 | End: 2023-09-15 | Stop reason: HOSPADM

## 2023-09-13 RX ORDER — PROMETHAZINE HYDROCHLORIDE 25 MG/1
12.5-25 SUPPOSITORY RECTAL EVERY 4 HOURS PRN
Status: CANCELLED | OUTPATIENT
Start: 2023-09-13

## 2023-09-13 RX ORDER — METOPROLOL SUCCINATE 25 MG/1
25 TABLET, EXTENDED RELEASE ORAL
Status: DISCONTINUED | OUTPATIENT
Start: 2023-09-13 | End: 2023-09-15

## 2023-09-13 RX ORDER — ATORVASTATIN CALCIUM 40 MG/1
40 TABLET, FILM COATED ORAL NIGHTLY
Status: ON HOLD | COMMUNITY
End: 2023-09-15

## 2023-09-13 RX ORDER — HYDRALAZINE HYDROCHLORIDE 20 MG/ML
10 INJECTION INTRAMUSCULAR; INTRAVENOUS EVERY 4 HOURS PRN
Status: CANCELLED | OUTPATIENT
Start: 2023-09-13

## 2023-09-13 RX ORDER — ATORVASTATIN CALCIUM 40 MG/1
40 TABLET, FILM COATED ORAL EVERY EVENING
Status: DISCONTINUED | OUTPATIENT
Start: 2023-09-13 | End: 2023-09-14

## 2023-09-13 RX ORDER — ONDANSETRON 4 MG/1
4 TABLET, ORALLY DISINTEGRATING ORAL EVERY 4 HOURS PRN
Status: DISCONTINUED | OUTPATIENT
Start: 2023-09-13 | End: 2023-09-15 | Stop reason: HOSPADM

## 2023-09-13 RX ORDER — TAMSULOSIN HYDROCHLORIDE 0.4 MG/1
0.4 CAPSULE ORAL EVERY EVENING
Status: DISCONTINUED | OUTPATIENT
Start: 2023-09-13 | End: 2023-09-15 | Stop reason: HOSPADM

## 2023-09-13 RX ORDER — ASPIRIN 81 MG/1
324 TABLET, CHEWABLE ORAL DAILY
Status: DISCONTINUED | OUTPATIENT
Start: 2023-09-13 | End: 2023-09-13 | Stop reason: HOSPADM

## 2023-09-13 RX ORDER — HEPARIN SODIUM 1000 [USP'U]/ML
2000 INJECTION, SOLUTION INTRAVENOUS; SUBCUTANEOUS PRN
Status: CANCELLED | OUTPATIENT
Start: 2023-09-13

## 2023-09-13 RX ORDER — ONDANSETRON 2 MG/ML
4 INJECTION INTRAMUSCULAR; INTRAVENOUS EVERY 4 HOURS PRN
Status: CANCELLED | OUTPATIENT
Start: 2023-09-13

## 2023-09-13 RX ORDER — HYDROCHLOROTHIAZIDE 25 MG/1
25 TABLET ORAL DAILY
Status: SHIPPED | COMMUNITY
End: 2023-09-13

## 2023-09-13 RX ORDER — NITROGLYCERIN 0.4 MG/1
0.4 TABLET SUBLINGUAL
Status: DISCONTINUED | OUTPATIENT
Start: 2023-09-13 | End: 2023-09-15 | Stop reason: HOSPADM

## 2023-09-13 RX ADMIN — ALLOPURINOL 300 MG: 300 TABLET ORAL at 17:06

## 2023-09-13 RX ADMIN — HEPARIN SODIUM 12 UNITS/KG/HR: 5000 INJECTION, SOLUTION INTRAVENOUS at 11:34

## 2023-09-13 RX ADMIN — ASPIRIN 81 MG 324 MG: 81 TABLET ORAL at 05:28

## 2023-09-13 RX ADMIN — ATORVASTATIN CALCIUM 40 MG: 40 TABLET, FILM COATED ORAL at 17:06

## 2023-09-13 RX ADMIN — TAMSULOSIN HYDROCHLORIDE 0.4 MG: 0.4 CAPSULE ORAL at 17:06

## 2023-09-13 RX ADMIN — SODIUM CHLORIDE: 9 INJECTION, SOLUTION INTRAVENOUS at 22:11

## 2023-09-13 RX ADMIN — HEPARIN SODIUM 4000 UNITS: 1000 INJECTION, SOLUTION INTRAVENOUS; SUBCUTANEOUS at 11:31

## 2023-09-13 RX ADMIN — ASPIRIN 325 MG: 325 TABLET ORAL at 11:31

## 2023-09-13 RX ADMIN — METOPROLOL SUCCINATE 25 MG: 25 TABLET, EXTENDED RELEASE ORAL at 22:04

## 2023-09-13 ASSESSMENT — ENCOUNTER SYMPTOMS
SHORTNESS OF BREATH: 1
TINGLING: 0
NAUSEA: 0
HEADACHES: 0
DEPRESSION: 0
PALPITATIONS: 0
CHILLS: 0
CONSTIPATION: 0
VOMITING: 0
SHORTNESS OF BREATH: 0
MYALGIAS: 0
DIAPHORESIS: 1
FEVER: 0
STRIDOR: 0
ABDOMINAL PAIN: 0
COUGH: 0
WEAKNESS: 0
DIARRHEA: 0
NAUSEA: 0
HEADACHES: 0
SHORTNESS OF BREATH: 1
ABDOMINAL PAIN: 0
CHEST TIGHTNESS: 0
LOSS OF CONSCIOUSNESS: 0
FALLS: 0
PALPITATIONS: 0
DIZZINESS: 0
DIZZINESS: 0
SPUTUM PRODUCTION: 0

## 2023-09-13 ASSESSMENT — COGNITIVE AND FUNCTIONAL STATUS - GENERAL
MOBILITY SCORE: 24
SUGGESTED CMS G CODE MODIFIER DAILY ACTIVITY: CH
DAILY ACTIVITIY SCORE: 24
SUGGESTED CMS G CODE MODIFIER MOBILITY: CH

## 2023-09-13 ASSESSMENT — LIFESTYLE VARIABLES
EVER HAD A DRINK FIRST THING IN THE MORNING TO STEADY YOUR NERVES TO GET RID OF A HANGOVER: NO
ON A TYPICAL DAY WHEN YOU DRINK ALCOHOL HOW MANY DRINKS DO YOU HAVE: 1
TOTAL SCORE: 0
TOTAL SCORE: 0
ALCOHOL_USE: YES
AVERAGE NUMBER OF DAYS PER WEEK YOU HAVE A DRINK CONTAINING ALCOHOL: 0
DOES PATIENT WANT TO STOP DRINKING: NO
TOTAL SCORE: 0
HOW MANY TIMES IN THE PAST YEAR HAVE YOU HAD 5 OR MORE DRINKS IN A DAY: 0
HAVE YOU EVER FELT YOU SHOULD CUT DOWN ON YOUR DRINKING: NO
EVER FELT BAD OR GUILTY ABOUT YOUR DRINKING: NO
HAVE PEOPLE ANNOYED YOU BY CRITICIZING YOUR DRINKING: NO
CONSUMPTION TOTAL: NEGATIVE

## 2023-09-13 ASSESSMENT — PATIENT HEALTH QUESTIONNAIRE - PHQ9
2. FEELING DOWN, DEPRESSED, IRRITABLE, OR HOPELESS: NOT AT ALL
1. LITTLE INTEREST OR PLEASURE IN DOING THINGS: NOT AT ALL
SUM OF ALL RESPONSES TO PHQ9 QUESTIONS 1 AND 2: 0

## 2023-09-13 ASSESSMENT — FIBROSIS 4 INDEX
FIB4 SCORE: 1.53
FIB4 SCORE: 1.53

## 2023-09-13 NOTE — DISCHARGE SUMMARY
"Discharge Summary    CHIEF COMPLAINT ON ADMISSION  Chief Complaint   Patient presents with    Chest Pain    Hypertension     62 yo male ambulates to triage with reports of chest pain that started around 2030 tonight.  Patient reports with onset of pain he was sitting watching his grandson do karate.  Reports pain radiates to left jaw.  Patient reports some shortness of breath.  Reports feels like he cant get in a deep breath.  Denies N/V.  ALso reports BP's have been higher than normal.  Normally runs 112-120's over 70-80's but today has been 140's/.  Reports he also got sweaty at one point with the chest pain        Reason for Admission  chest pain, high blood pressure     Admission Date  9/12/2023    CODE STATUS  Full Code    HPI & HOSPITAL COURSE  Per notes, \"63 y.o. male who presented 9/12/2023 with chest pain.  Patient states he was in his usual state of health, he was watching his grandson do karate when he began having left-sided chest pain.  Patient states it was a tightness/pressure.  He then began noticing left jaw pain.  Patient states he then took his blood pressure and it was elevated, did slowly improve.  Patient states he suddenly became sweaty.  He also complained of shortness of breath.  Patient states the symptoms were similar to when he has SVT however his heart rate never increased.  Because of these ongoing changes, patient presented to the emergency department.  Of note, he does have significant coronary artery disease in his family.  I discussed the case including labs and imaging with the ER physician.\"    Patient was admitted and evaluated for chest pain. Chest pain resolved overnight. However, he did have an initial elevation of troponins (41, 133, 188, 352).  Echocardiogram with EF of 65%, normal systolic function. Patient was started on heparin for ACS and case was discussed with cardiologist on call, Dr Weinstein, who recommended transfer to [facility] for further evaluation " and possible cardiac catheterization.     Therefore, he is discharged in guarded and stable condition to a short-term general hosptial for inpatient care.    The patient recovered much more quickly than anticipated on admission.    Discharge Date  9/13/2023    FOLLOW UP ITEMS POST DISCHARGE  FU with PCP   FU with cardiology     DISCHARGE DIAGNOSES  Principal Problem:    Chest pain (POA: Yes)  Active Problems:    Essential hypertension (POA: Yes)    Benign prostatic hyperplasia without lower urinary tract symptoms (POA: Yes)    Hyperglycemia (POA: Yes)    Gout (POA: Yes)    Leukocytosis (POA: Yes)  Resolved Problems:    * No resolved hospital problems. *      FOLLOW UP  No future appointments.  No follow-up provider specified.    MEDICATIONS ON DISCHARGE     Medication List        ASK your doctor about these medications        Instructions   allopurinol 300 MG Tabs  Commonly known as: Zyloprim   Take 1 tablet by mouth every day.  Dose: 300 mg     amLODIPine 5 MG Tabs  Commonly known as: Norvasc   Doctor's comments: Requesting 1 year supply  TAKE 1 TABLET BY MOUTH  DAILY     atorvastatin 40 MG Tabs  Commonly known as: Lipitor  Ask about: Which instructions should I use?   Take 40 mg by mouth every evening.  Dose: 40 mg     D3 PO   Take 1 Capsule by mouth every evening.  Dose: 1 Capsule     fish oil 1000 MG Caps capsule   Take 1,000 mg by mouth every day.  Dose: 1,000 mg     flecainide 100 MG Tabs  Commonly known as: Tambocor   Take 100 mg by mouth 2 times a day.  Dose: 100 mg     fluticasone 50 MCG/ACT nasal spray  Commonly known as: Flonase   USE 1 SPRAY IN EACH NOSTRIL EVERY DAY     ibuprofen 200 MG Tabs  Commonly known as: Motrin   Take 400 mg by mouth every 6 hours as needed. Indications: Pain  Dose: 400 mg     IRON PO   Take 1 Tablet by mouth every evening.  Dose: 1 Tablet     loratadine 10 MG Tabs  Commonly known as: Claritin   Take 10 mg by mouth as needed. Indications: Hayfever  Dose: 10 mg     metFORMIN 500  MG Tabs  Commonly known as: Glucophage   Doctor's comments: Requesting 1 year supply  TAKE 1 TABLET BY MOUTH  TWICE DAILY     NON SPECIFIED   Take 1 Capsule by mouth 3 times a day. MCT oil (OTC)  Dose: 1 Capsule     tamsulosin 0.4 MG capsule  Commonly known as: Flomax   Take 0.4 mg by mouth every evening.  Dose: 0.4 mg     therapeutic multivitamin-minerals Tabs   Take 1 Tab by mouth every day.  Dose: 1 Tablet     triamterene-hctz 37.5-25 MG Tabs  Commonly known as: Maxzide-25/Dyazide   Take 1 Tablet by mouth every evening.  Dose: 1 Tablet     VITAMIN K2 PO   Take 1 Capsule by mouth every evening.  Dose: 1 Capsule              Allergies  Allergies   Allergen Reactions    Lisinopril Hives       DIET  Orders Placed This Encounter   Procedures    Diet Order Diet: Cardiac; Miscellaneous modifications: (optional): No Decaf, No Caffeine(for test)     Standing Status:   Standing     Number of Occurrences:   1     Order Specific Question:   Diet:     Answer:   Cardiac [6]     Order Specific Question:   Miscellaneous modifications: (optional)     Answer:   No Decaf, No Caffeine(for test) [11]       ACTIVITY  As tolerated.  Weight bearing as tolerated    CONSULTATIONS  Cardiology     PROCEDURES  None    LABORATORY  Lab Results   Component Value Date    SODIUM 139 09/13/2023    POTASSIUM 4.0 09/13/2023    CHLORIDE 105 09/13/2023    CO2 25 09/13/2023    GLUCOSE 110 (H) 09/13/2023    BUN 16 09/13/2023    CREATININE 0.79 09/13/2023        Lab Results   Component Value Date    WBC 10.2 09/13/2023    HEMOGLOBIN 13.8 (L) 09/13/2023    HEMATOCRIT 41.9 (L) 09/13/2023    PLATELETCT 235 09/13/2023        Total time of the discharge process exceeds 38 minutes.

## 2023-09-13 NOTE — H&P
"Hospital Medicine History & Physical Note    Date of Service  9/13/2023    Primary Care Physician  Pcp Pt States None    Consultants  cardiology    Specialist Names: Dr Weinstein    Code Status  Full Code    Chief Complaint  No chief complaint on file.      History of Presenting Illness  Per notes, \"63 y.o. male who presented 9/12/2023 with chest pain.  Patient states he was in his usual state of health, he was watching his grandson do karate when he began having left-sided chest pain.  Patient states it was a tightness/pressure.  He then began noticing left jaw pain.  Patient states he then took his blood pressure and it was elevated, did slowly improve.  Patient states he suddenly became sweaty.  He also complained of shortness of breath.  Patient states the symptoms were similar to when he has SVT however his heart rate never increased.  Because of these ongoing changes, patient presented to the emergency department.  Of note, he does have significant coronary artery disease in his family.  I discussed the case including labs and imaging with the ER physician.\"    Patient was admitted and evaluated for chest pain. Chest pain resolved overnight. However, he did have an initial elevation of troponins (41, 133, 188, 352).  Echocardiogram with EF of 65%, normal systolic function. Patient was started on heparin for ACS and case was discussed with cardiologist on call, Dr Weinstein, who recommended transfer to [facility] for further evaluation and possible cardiac catheterization.     Please contact cardiology once patient arrives.     I discussed the plan of care with patient, family, bedside RN, charge RN, and .    Review of Systems  Review of Systems   Respiratory:  Positive for shortness of breath.    Cardiovascular:  Positive for chest pain.   All other systems reviewed and are negative.      Past Medical History   has a past medical history of Anesthesia (08/13/2019), Arthritis (08/13/2019), " Breath shortness (08/13/2019), Celiac artery aneurysm (HCC), Chickenpox, Diabetes (HCC) (08/14/2020), Dyslipidemia, High cholesterol, Hypertension, Kidney stone, Sleep apnea, and Snoring.    Surgical History   has a past surgical history that includes lithotripsy; knee arthroscopy; other orthopedic surgery; bone spur excision; other orthopedic surgery; other; aortofemoral bypass (N/A, 8/15/2019); and incision hernia repair (8/19/2020).     Family History  family history includes Cancer in his brother; Diabetes in his brother and father; Sleep Apnea in his brother and brother.   Family history reviewed with patient. There is no family history that is pertinent to the chief complaint.     Social History   reports that he has never smoked. He has never used smokeless tobacco. He reports current alcohol use. He reports that he does not use drugs.    Allergies  Allergies   Allergen Reactions    Lisinopril Hives       Medications  Cannot display prior to admission medications because the patient has not been admitted in this contact.       Physical Exam  Temp:  [36.1 °C (97 °F)-36.7 °C (98 °F)] 36.4 °C (97.5 °F)  Pulse:  [60-95] 70  Resp:  [7-27] 21  BP: ()/(53-85) 123/85  SpO2:  [92 %-96 %] 96 %                          Physical Exam  Vitals and nursing note reviewed.   Constitutional:       Appearance: Normal appearance.   Cardiovascular:      Rate and Rhythm: Normal rate and regular rhythm.      Pulses: Normal pulses.      Heart sounds: Normal heart sounds.   Pulmonary:      Effort: Pulmonary effort is normal.      Breath sounds: Normal breath sounds.   Abdominal:      General: Abdomen is flat. Bowel sounds are normal.   Neurological:      General: No focal deficit present.      Mental Status: He is alert and oriented to person, place, and time. Mental status is at baseline.         Laboratory:  Recent Labs     09/12/23  2132 09/13/23  0124   WBC 12.2* 10.2   RBC 4.96 4.76   HEMOGLOBIN 14.4 13.8*   HEMATOCRIT  "44.2 41.9*   MCV 89.1 88.0   MCH 29.0 29.0   MCHC 32.6 32.9   RDW 48.8 48.1   PLATELETCT 239 235   MPV 10.4 10.4     Recent Labs     09/12/23 2132 09/13/23  0124   SODIUM 140 139   POTASSIUM 3.7 4.0   CHLORIDE 103 105   CO2 24 25   GLUCOSE 107* 110*   BUN 16 16   CREATININE 0.87 0.79   CALCIUM 10.4* 10.1     Recent Labs     09/12/23 2132 09/13/23  0124   ALTSGPT 25  --    ASTSGOT 28  --    ALKPHOSPHAT 149*  --    TBILIRUBIN 0.4  --    LIPASE  --  15   GLUCOSE 107* 110*     Recent Labs     09/13/23  1052   APTT 28.3   INR 1.09     No results for input(s): \"NTPROBNP\" in the last 72 hours.  Recent Labs     09/13/23  0124   TRIGLYCERIDE 53   HDL 52   LDL 72     Recent Labs     09/13/23  0124 09/13/23  0502 09/13/23  1052   TROPONINT 133* 188* 352*       Imaging:  No orders to display       X-Ray:  I have personally reviewed the images and compared with prior images.    Assessment/Plan:  Justification for Admission Status  I anticipate this patient will require at least two midnights for appropriate medical management, necessitating inpatient admission because ongoing working and monitoring of ACS on telemetry     Patient will need a Telemetry bed on MEDICAL service .  The need is secondary to chest pain.    NSTEMI (non-ST elevated myocardial infarction) (HCC)- (present on admission)  Assessment & Plan  Patient was admitted and evaluated for chest pain. Chest pain resolved overnight. However, he did have an initial elevation of troponins (41, 133, 188, 352).  Echocardiogram with EF of 65%, normal systolic function.   Heparin for ACS  Discussed with cardiologist on call, Dr Weinstein, who recommended transfer to [facility] for further evaluation and possible cardiac catheterization.   Close monitoring on telemetry      BMI 36.0-36.9,adult- (present on admission)  Assessment & Plan  - Discussed diet and lifestyle modifications with patient  - Patient will need to follow up with PCP for outpatient management "       Pure hypercholesterolemia- (present on admission)  Assessment & Plan  Atorvastatin    DK (obstructive sleep apnea)- (present on admission)  Assessment & Plan  Nocturnal CPAP    Essential hypertension- (present on admission)  Assessment & Plan  Continue with amlodipine-monitor closely as patient did have some hypotension admission        VTE prophylaxis: therapeutic anticoagulation with heparin    Total time spent on admission over 75 minutes.  This included my review with ER physician, review of ED events, patient's history, outside records, recent records, face to face interview, physical examination; my review of lab results (CBC, chemistry panel), imaging review (CXR) and ECG.   In addition, counseling patient and speaking with consultants.

## 2023-09-13 NOTE — H&P
Hospital Medicine History & Physical Note    Date of Service  9/12/2023    Primary Care Physician  Pcp Pt States None    Consultants  None    Specialist Names: None    Code Status  Full Code    Chief Complaint  Chief Complaint   Patient presents with    Chest Pain    Hypertension     62 yo male ambulates to triage with reports of chest pain that started around 2030 tonight.  Patient reports with onset of pain he was sitting watching his grandson do karate.  Reports pain radiates to left jaw.  Patient reports some shortness of breath.  Reports feels like he cant get in a deep breath.  Denies N/V.  ALso reports BP's have been higher than normal.  Normally runs 112-120's over 70-80's but today has been 140's/.  Reports he also got sweaty at one point with the chest pain        History of Presenting Illness  Jhonatan Fonseca is a 63 y.o. male who presented 9/12/2023 with chest pain.  Patient states he was in his usual state of health, he was watching his grandson do karate when he began having left-sided chest pain.  Patient states it was a tightness/pressure.  He then began noticing left jaw pain.  Patient states he then took his blood pressure and it was elevated, did slowly improve.  Patient states he suddenly became sweaty.  He also complained of shortness of breath.  Patient states the symptoms were similar to when he has SVT however his heart rate never increased.  Because of these ongoing changes, patient presented to the emergency department.  Of note, he does have significant coronary artery disease in his family.  I discussed the case including labs and imaging with the ER physician.    I discussed the plan of care with patient.    Review of Systems  Review of Systems   Constitutional:  Negative for chills, fever and malaise/fatigue.   HENT:  Negative for congestion.         Left jaw pain   Respiratory:  Positive for shortness of breath. Negative for cough, sputum production and stridor.     Cardiovascular:  Positive for chest pain. Negative for palpitations and leg swelling.   Gastrointestinal:  Negative for abdominal pain, constipation, diarrhea, nausea and vomiting.   Genitourinary:  Negative for dysuria and urgency.   Musculoskeletal:  Negative for falls and myalgias.   Neurological:  Negative for dizziness, tingling, loss of consciousness, weakness and headaches.   Psychiatric/Behavioral:  Negative for depression and suicidal ideas.    All other systems reviewed and are negative.      Past Medical History   has a past medical history of Anesthesia (08/13/2019), Arthritis (08/13/2019), Breath shortness (08/13/2019), Celiac artery aneurysm (HCC), Chickenpox, Diabetes (HCC) (08/14/2020), Dyslipidemia, High cholesterol, Hypertension, Kidney stone, Sleep apnea, and Snoring.    Surgical History   has a past surgical history that includes lithotripsy; knee arthroscopy; other orthopedic surgery; bone spur excision; other orthopedic surgery; other; aortofemoral bypass (N/A, 8/15/2019); and incision hernia repair (8/19/2020).     Family History  family history includes Cancer in his brother; Diabetes in his brother and father; Sleep Apnea in his brother and brother.   Family history reviewed with patient. There is family history that is pertinent to the chief complaint.     Social History   reports that he has never smoked. He has never used smokeless tobacco. He reports current alcohol use. He reports that he does not use drugs.    Allergies  Allergies   Allergen Reactions    Lisinopril Hives       Medications  Prior to Admission Medications   Prescriptions Last Dose Informant Patient Reported? Taking?   Omega-3 Fatty Acids (FISH OIL) 1000 MG Cap capsule  Patient Yes No   Sig: Take 1,000 mg by mouth every evening.   allopurinol (ZYLOPRIM) 300 MG Tab   No No   Sig: Take 1 tablet by mouth every day.   amLODIPine (NORVASC) 5 MG Tab   No No   Sig: TAKE 1 TABLET BY MOUTH  DAILY   aspirin EC (ECOTRIN) 81 MG  Tablet Delayed Response  Patient Yes No   Sig: Take 81 mg by mouth every day.   atorvastatin (LIPITOR) 20 MG Tab   No No   Sig: TAKE 1 TABLET BY MOUTH  DAILY   Patient not taking: Reported on 11/2/2022   fluticasone (FLONASE) 50 MCG/ACT nasal spray   No No   Sig: USE 1 SPRAY IN EACH NOSTRIL EVERY DAY   loratadine (CLARITIN) 10 MG Tab  Patient Yes No   Sig: Take 10 mg by mouth every evening.   metFORMIN (GLUCOPHAGE) 500 MG Tab   No No   Sig: TAKE 1 TABLET BY MOUTH  TWICE DAILY   metoprolol SR (TOPROL XL) 100 MG TABLET SR 24 HR   Yes No   Sig: TAKE 1 TABLET BY MOUTH ONCE DAILY (STOP 50 MG DOSE)   tamsulosin (FLOMAX) 0.4 MG capsule  Patient Yes No   Sig: Take 0.4 mg by mouth every day.   therapeutic multivitamin-minerals (THERAGRAN-M) Tab  Patient Yes No   Sig: Take 1 Tab by mouth every day.      Facility-Administered Medications: None       Physical Exam  Temp:  [36.7 °C (98 °F)] 36.7 °C (98 °F)  Pulse:  [95] 95  Resp:  [18] 18  BP: (120)/(85) 120/85  SpO2:  [94 %] 94 %  Blood Pressure: 120/85   Temperature: 36.7 °C (98 °F)   Pulse: 95   Respiration: 18   Pulse Oximetry: 94 %       Physical Exam  Vitals and nursing note reviewed.   Constitutional:       General: He is not in acute distress.     Appearance: He is well-developed. He is not toxic-appearing or diaphoretic.   HENT:      Head: Normocephalic and atraumatic.      Right Ear: External ear normal.      Left Ear: External ear normal.      Nose: Nose normal. No congestion or rhinorrhea.      Mouth/Throat:      Pharynx: No oropharyngeal exudate.   Eyes:      General:         Right eye: No discharge.         Left eye: No discharge.   Neck:      Trachea: No tracheal deviation.   Cardiovascular:      Rate and Rhythm: Normal rate and regular rhythm.      Heart sounds: No murmur heard.     No friction rub. No gallop.   Pulmonary:      Effort: Pulmonary effort is normal. No respiratory distress.      Breath sounds: Normal breath sounds. No stridor. No wheezing or rales.  "  Chest:      Chest wall: No tenderness.   Abdominal:      General: Bowel sounds are normal. There is no distension.      Palpations: Abdomen is soft.      Tenderness: There is no abdominal tenderness.   Musculoskeletal:         General: No tenderness. Normal range of motion.      Cervical back: Normal range of motion and neck supple. No edema or erythema.      Right lower leg: No edema.      Left lower leg: No edema.   Lymphadenopathy:      Cervical: No cervical adenopathy.   Skin:     General: Skin is warm and dry.      Findings: No erythema or rash.   Neurological:      Mental Status: He is alert and oriented to person, place, and time.      Cranial Nerves: No cranial nerve deficit.   Psychiatric:         Mood and Affect: Mood normal.         Behavior: Behavior normal.         Thought Content: Thought content normal.         Judgment: Judgment normal.         Laboratory:  Recent Labs     09/12/23 2132   WBC 12.2*   RBC 4.96   HEMOGLOBIN 14.4   HEMATOCRIT 44.2   MCV 89.1   MCH 29.0   MCHC 32.6   RDW 48.8   PLATELETCT 239   MPV 10.4     Recent Labs     09/12/23 2132   SODIUM 140   POTASSIUM 3.7   CHLORIDE 103   CO2 24   GLUCOSE 107*   BUN 16   CREATININE 0.87   CALCIUM 10.4*     Recent Labs     09/12/23 2132   ALTSGPT 25   ASTSGOT 28   ALKPHOSPHAT 149*   TBILIRUBIN 0.4   GLUCOSE 107*         No results for input(s): \"NTPROBNP\" in the last 72 hours.      Recent Labs     09/12/23 2132   TROPONINT 41*       Imaging:  DX-CHEST-PORTABLE (1 VIEW)    (Results Pending)   NM-CARDIAC STRESS TEST    (Results Pending)       EKG:  I have personally reviewed the images and compared with prior images.    Assessment/Plan:  Justification for Admission Status  I anticipate this patient is appropriate for observation status at this time because chest pain    Patient will need a Telemetry bed on MEDICAL service .  The need is secondary to chest pain.    * Chest pain- (present on admission)  Assessment & Plan  The ASCVD Risk score " (Sandeep CRAWFORD, et al., 2019) failed to calculate for the following reasons:    The valid systolic blood pressure range is 90 to 200 mmHg  Patient does have a concerning story with all the additional changes as well as his family history, he is at high risk  Troponin is mildly elevated  Continue to trend troponin, monitor patient on telemetry and repeat EKG  If no worsening, obtain a stress test in the morning    Leukocytosis- (present on admission)  Assessment & Plan  Likely reactive, no additional signs of bacterial infection, no need for antibiotics    Gout- (present on admission)  Assessment & Plan  Continue home allopurinol  No acute flare    Hyperglycemia- (present on admission)  Assessment & Plan  Mild, no need for coverage    Benign prostatic hyperplasia without lower urinary tract symptoms- (present on admission)  Assessment & Plan  Continue home Flomax    Essential hypertension- (present on admission)  Assessment & Plan  Continue home amlodipine and metoprolol  Start as needed labetalol  Adjust as needed        VTE prophylaxis: SCDs/TEDs

## 2023-09-13 NOTE — ASSESSMENT & PLAN NOTE
The ASCVD Risk score (Sandeep CRAWFORD, et al., 2019) failed to calculate for the following reasons:    The valid systolic blood pressure range is 90 to 200 mmHg  Patient does have a concerning story with all the additional changes as well as his family history, he is at high risk  Troponin is mildly elevated  Continue to trend troponin, monitor patient on telemetry and repeat EKG  If no worsening, obtain a stress test in the morning

## 2023-09-13 NOTE — CARE PLAN
The patient is Stable - Low risk of patient condition declining or worsening    Shift Goals  Clinical Goals: chest pain free  Patient Goals: keep informed    Progress made toward(s) clinical / shift goals:  denies chest pain    Patient is not progressing towards the following goals:    Problem: Knowledge Deficit - Standard  Goal: Patient and family/care givers will demonstrate understanding of plan of care, disease process/condition, diagnostic tests and medications  Outcome: Progressing     Problem: Optimal Care for the Acute Coronary Syndrome Patient  Goal: Optimal Care for the Acute Coronary Syndrome Patient  Outcome: Progressing  Goal: Potential Interventions for the Acute Coronary Syndrome Patient  Outcome: Progressing     Problem: Optimal Care for the Cath Lab Patient  Goal: Optimal Care for the Cath Lab Patient  Outcome: Progressing     Problem: Discharge Care for the Acute Coronary Syndrome Patient  Goal: Patient will be discharged on guideline directed medical therapy  Outcome: Progressing     Problem: Respiratory  Goal: Patient will achieve/maintain optimum respiratory ventilation and gas exchange  Outcome: Progressing     Problem: Pain - Standard  Goal: Alleviation of pain or a reduction in pain to the patient’s comfort goal  Outcome: Progressing

## 2023-09-13 NOTE — PROGRESS NOTES
Tele SR  possible 1st degree av block at times. .22/.09/.43    1835 called by monitor tech. Patient 130s for a few min.. then came back down to 70s . SR            EKG

## 2023-09-13 NOTE — ED PROVIDER NOTES
ED Provider Note    CHIEF COMPLAINT  Chief Complaint   Patient presents with    Chest Pain    Hypertension     64 yo male ambulates to triage with reports of chest pain that started around 2030 tonight.  Patient reports with onset of pain he was sitting watching his grandson do karate.  Reports pain radiates to left jaw.  Patient reports some shortness of breath.  Reports feels like he cant get in a deep breath.  Denies N/V.  ALso reports BP's have been higher than normal.  Normally runs 112-120's over 70-80's but today has been 140's/.  Reports he also got sweaty at one point with the chest pain        EXTERNAL RECORDS REVIEWED  Outpatient Notes reviewed office visit progress note by Dr. Metzger dated November 2, 2022.  Patient seen requesting sleep medicine consultation, history of hypertension, dyslipidemia, and obstructive sleep apnea noted.    HPI/ROS  LIMITATION TO HISTORY   Select: : None  OUTSIDE HISTORIAN(S):  None available    Jhonatan Fonseca is a 63 y.o. male who presents for evaluation of left-sided chest pain.  Patient relates symptoms started about 8:30 AM.  He notes he was at rest watching his grandson at a karate match.  Pain isolated to the left anterior chest with radiation to left jaw.  Associated dyspnea.  Endorses episode of diaphoresis as well, no nausea, no vomiting.  Patient took Advil at home with no improvement, pain currently is mild but still localized as above.  He has a history of dyslipidemia and hypertension, no known personal history of cardiovascular disease but he does follow with a cardiologist notes there is plan for a CT angiogram of his heart, he follows with [facility] cardiology.    PAST MEDICAL HISTORY   has a past medical history of Anesthesia (08/13/2019), Arthritis (08/13/2019), Breath shortness (08/13/2019), Celiac artery aneurysm (HCC), Chickenpox, Diabetes (HCC) (08/14/2020), Dyslipidemia, High cholesterol, Hypertension, Kidney stone, Sleep apnea, and  "Snoring.    SURGICAL HISTORY   has a past surgical history that includes lithotripsy; knee arthroscopy; other orthopedic surgery; bone spur excision; other orthopedic surgery; other; aortofemoral bypass (N/A, 8/15/2019); and incision hernia repair (8/19/2020).    FAMILY HISTORY  Family History   Problem Relation Age of Onset    Cancer Brother         parathyroid    Sleep Apnea Brother     Diabetes Brother     Sleep Apnea Brother     Diabetes Father    Coronary artery disease in brother.    SOCIAL HISTORY  Social History     Tobacco Use    Smoking status: Never    Smokeless tobacco: Never   Vaping Use    Vaping Use: Never used   Substance and Sexual Activity    Alcohol use: Yes     Comment: rarely    Drug use: No    Sexual activity: Yes     Partners: Female       CURRENT MEDICATIONS  Home Medications       Reviewed by Renetta Salvador R.N. (Registered Nurse) on 09/12/23 at 2144  Med List Status: Not Addressed     Medication Last Dose Status   allopurinol (ZYLOPRIM) 300 MG Tab  Active   amLODIPine (NORVASC) 5 MG Tab  Active   aspirin EC (ECOTRIN) 81 MG Tablet Delayed Response  Active   atorvastatin (LIPITOR) 20 MG Tab  Active   fluticasone (FLONASE) 50 MCG/ACT nasal spray  Active   loratadine (CLARITIN) 10 MG Tab  Active   metFORMIN (GLUCOPHAGE) 500 MG Tab  Active   metoprolol SR (TOPROL XL) 100 MG TABLET SR 24 HR  Active   Omega-3 Fatty Acids (FISH OIL) 1000 MG Cap capsule  Active   tamsulosin (FLOMAX) 0.4 MG capsule  Active   therapeutic multivitamin-minerals (THERAGRAN-M) Tab  Active                    ALLERGIES  Allergies   Allergen Reactions    Lisinopril Hives       PHYSICAL EXAM  VITAL SIGNS: /85   Pulse 95   Temp 36.7 °C (98 °F) (Temporal)   Resp 18   Ht 1.778 m (5' 10\")   Wt 119 kg (261 lb 14.5 oz)   SpO2 94%   BMI 37.58 kg/m²    General: Alert, no acute distress  Skin: Warm, dry, normal for ethnicity  Head: Normocephalic, atraumatic  Neck: Trachea midline, no JVD  Eye: PERRL, normal " conjunctiva  ENMT: Oral mucosa moist, no pharyngeal erythema or exudate.  Fillings noted to left mandibular premolar and molar but no swelling.  Cardiovascular: Regular rate and rhythm, No murmur, Normal peripheral perfusion  Respiratory: Lungs CTA, respirations are non-labored, breath sounds are equal  Gastrointestinal:  non distended  Musculoskeletal: No swelling, no deformity, trace pitting pretibial edema  Neurological: Alert and oriented to person, place, time, and situation  Lymphatics: No lymphadenopathy  Psychiatric: Cooperative, appropriate mood & affect     DIAGNOSTIC STUDIES / PROCEDURES  EKG  I have independently interpreted this EKG  EKG Interpretation    Interpreted by emergency department physician    Rhythm: normal sinus   Rate: 94  Axis: normal  Ectopy: none  Conduction: 1st degree AV block  ST Segments: no acute change  T Waves: no acute change  Q Waves: none    Clinical Impression: non-specific EKG, no pathologic change beyond the first-degree AV block noted compared to previous EKG dated August 14, 2020    LABS  Results for orders placed or performed during the hospital encounter of 09/12/23   CBC with Differential   Result Value Ref Range    WBC 12.2 (H) 4.8 - 10.8 K/uL    RBC 4.96 4.70 - 6.10 M/uL    Hemoglobin 14.4 14.0 - 18.0 g/dL    Hematocrit 44.2 42.0 - 52.0 %    MCV 89.1 81.4 - 97.8 fL    MCH 29.0 27.0 - 33.0 pg    MCHC 32.6 32.3 - 36.5 g/dL    RDW 48.8 35.9 - 50.0 fL    Platelet Count 239 164 - 446 K/uL    MPV 10.4 9.0 - 12.9 fL    Neutrophils-Polys 62.20 44.00 - 72.00 %    Lymphocytes 27.60 22.00 - 41.00 %    Monocytes 8.00 0.00 - 13.40 %    Eosinophils 1.30 0.00 - 6.90 %    Basophils 0.60 0.00 - 1.80 %    Immature Granulocytes 0.30 0.00 - 0.90 %    Nucleated RBC 0.00 0.00 - 0.20 /100 WBC    Neutrophils (Absolute) 7.60 (H) 1.82 - 7.42 K/uL    Lymphs (Absolute) 3.38 1.00 - 4.80 K/uL    Monos (Absolute) 0.98 (H) 0.00 - 0.85 K/uL    Eos (Absolute) 0.16 0.00 - 0.51 K/uL    Baso (Absolute)  0.07 0.00 - 0.12 K/uL    Immature Granulocytes (abs) 0.04 0.00 - 0.11 K/uL    NRBC (Absolute) 0.00 K/uL   Complete Metabolic Panel (CMP)   Result Value Ref Range    Sodium 140 135 - 145 mmol/L    Potassium 3.7 3.6 - 5.5 mmol/L    Chloride 103 96 - 112 mmol/L    Co2 24 20 - 33 mmol/L    Anion Gap 13.0 7.0 - 16.0    Glucose 107 (H) 65 - 99 mg/dL    Bun 16 8 - 22 mg/dL    Creatinine 0.87 0.50 - 1.40 mg/dL    Calcium 10.4 (H) 8.4 - 10.2 mg/dL    Correct Calcium 10.3 8.5 - 10.5 mg/dL    AST(SGOT) 28 12 - 45 U/L    ALT(SGPT) 25 2 - 50 U/L    Alkaline Phosphatase 149 (H) 30 - 99 U/L    Total Bilirubin 0.4 0.1 - 1.5 mg/dL    Albumin 4.1 3.2 - 4.9 g/dL    Total Protein 7.6 6.0 - 8.2 g/dL    Globulin 3.5 1.9 - 3.5 g/dL    A-G Ratio 1.2 g/dL   Troponins NOW   Result Value Ref Range    Troponin T 41 (H) 6 - 19 ng/L   ESTIMATED GFR   Result Value Ref Range    GFR (CKD-EPI) 96 >60 mL/min/1.73 m 2   EKG   Result Value Ref Range    Report       [facility] Emergency Dept.    Test Date:  2023  Pt Name:    JEOVANY RDZ                  Department: [facility]  MRN:        [id number]                      Room:  Gender:     Male                         Technician: marcia  :        1960                   Requested By:ER TRIAGE PROTOCOL  Order #:    [id number]                    Reading MD:    Measurements  Intervals                                Axis  Rate:       94                           P:          33  SC:         235                          QRS:        9  QRSD:       104                          T:          36  QT:         374  QTc:        468    Interpretive Statements  Sinus rhythm  Prolonged SC interval  Abnormal R-wave progression, early transition  Compared to ECG 2020 09:18:08  First degree AV block now present          RADIOLOGY  I have independently interpreted the diagnostic imaging associated with this visit and am waiting the final reading from the radiologist.   My preliminary  "interpretation is as follows: No lobar infiltrate, no pulmonary edema  Radiologist interpretation:   DX-CHEST-PORTABLE (1 VIEW)    (Results Pending)        COURSE & MEDICAL DECISION MAKING    ED Observation Status? Yes; I am placing the patient in to an observation status due to a diagnostic uncertainty as well as therapeutic intensity. Patient placed in observation status at 10:22 PM, 9/12/2023.     Observation plan is as follows: Patient medicated with aspirin 324 mg p.o. as well as nitroglycerin 0.4 mg sublingual.  Cardiac work-up will be obtained.Differential diagnosis at this point includes but is not restricted to acute coronary syndrome, myocardial infarction, angina    2320: Patient reassessed, he is in no han distress at this time.  I have updated patient with work-up results including elevated troponin.    2322: I have heard back from the hospitalist Dr. Avendaño, he concurs with plan of care thus far and accepts the patient to his service for admission.    Patient Vitals for the past 24 hrs:   BP Temp Temp src Pulse Resp SpO2 Height Weight   09/12/23 2142 120/85 36.7 °C (98 °F) Temporal 95 18 94 % -- --   09/12/23 2135 -- -- -- -- -- -- 1.778 m (5' 10\") 119 kg (261 lb 14.5 oz)        Upon Reevaluation, the patient's condition has: not improved; and will be escalated to hospitalization.    Patient discharged from ED Observation status at 2325 (Time) 9/12/23 (Date).     INITIAL ASSESSMENT, COURSE AND PLAN  Care Narrative: This is a 63-year-old gentleman with history of dyslipidemia, hypertension, and family history of cardiac death in his brother who presents for evaluation of acute precordial chest pain radiating to his jaw with associate dyspnea and diaphoresis.  EKG is reassuring beyond a first-degree AV block, no evidence of ischemic change.  However his initial troponin is more than double the upper end of normal.  Given his multiple risk factors, concerning acute history, and abnormal cardiac enzymes " presentation is concerning for potential acute coronary syndrome.  He is treated with aspirin and nitroglycerin here in the ED with good effect.  Clear indication as such for admission for further evaluation and management and cardiac restratification.  HTN/IDDM FOLLOW UP:  The patient is referred to a primary physician for blood pressure management, diabetic screening, and for all other preventive health concerns      ADDITIONAL PROBLEM LIST  Chest pain, dyspnea  DISPOSITION AND DISCUSSIONS  I have discussed management of the patient with the following physicians and VETO's:  I spoke with the hospitalist Dr. Avendaño, he concurs with plan of care thus far and accepts the patient to his service for admission.    Discussion of management with other [facility] or appropriate source(s): None     Escalation of care considered, and ultimately not performed:NA    Barriers to care at this time, including but not limited to:  NA .     Decision tools and prescription drugs considered including, but not limited to: HEART Score 5, moderate risk stratification .    FINAL DIAGNOSIS  1. Precordial chest pain    2. Hypertensive urgency           Electronically signed by: Huan Robb M.D., 9/12/2023 10:22 PM

## 2023-09-13 NOTE — ED NOTES
Medication history reviewed with pt. Med rec is complete.  Allergies reviewed, per pt  Pt had a list of medications that he read to this writer.   Pt reports that his doctor told him to stop taking his METFORMIN 500MG about 2 weeks ago.    Patient has not had any outpatient antibiotics in the last 30 days.    Pt is not on any anticoagulants

## 2023-09-13 NOTE — ED NOTES
Hospitalist notified of troponin 188 from 133. Patient able to walk to restroom without dizziness, SOB, or Chest pain at this time. New orders received. Care continued.

## 2023-09-13 NOTE — ED TRIAGE NOTES
"Chief Complaint   Patient presents with    Chest Pain    Hypertension     62 yo male ambulates to triage with reports of chest pain that started around 2030 tonight.  Patient reports with onset of pain he was sitting watching his grandson do karfabio.  Reports pain radiates to left jaw.  Patient reports some shortness of breath.  Reports feels like he cant get in a deep breath.  Denies N/V.  ALso reports BP's have been higher than normal.  Normally runs 112-120's over 70-80's but today has been 140's/.  Reports he also got sweaty at one point with the chest pain     /85   Pulse 95   Temp 36.7 °C (98 °F) (Temporal)   Resp 18   Ht 1.778 m (5' 10\")   Wt 119 kg (261 lb 14.5 oz)   SpO2 94%   BMI 37.58 kg/m²    "

## 2023-09-13 NOTE — PROGRESS NOTES
Dr. Hale reached out to due to concern for increasing Troponin level. Pt is not complaining of any chest pain, vital signs are within normal limits. Another EKG was ordered and completed. Results showing a sinus arrhythmia was sent to Dr. Hale. Echo completed.

## 2023-09-13 NOTE — PROGRESS NOTES
[facility] DIRECT ADMISSION REPORT  Transferring facility: [facility]  Transferring physician: Dr. Blessing Dunne    Chief complaint: Chest pain and elevated troponin  Pertinent history & patient course:   63-year-old male with some cardiac risk factors who presented to [facility] with chest pain.  Chest pain self resolved and did not recur overnight.  He was initially admitted for chest pain rule out and had rising troponins at 181 with no recurrent chest pain.  Vital signs are stable.  My colleague consulted cardiology recommended transfer to our hospital for possible further evaluation and invasive coronary intervention.  He was placed on empiric weight-based heparin drip.    Pertinent imaging & lab results: As above cardiology  Consultants called prior to transfer and pertinent input from consultants:   Code Status: Full code full care per transferring provider, I personally verified with the transferring provider patient's code status and the transferring provider has confirmed this with the patient.  Reason for Transfer: Higher level of care  Further work up or recommendations requested prior to transfer: None    Patient accepted for transfer: Yes  Accepting [facility] Facility: [facility] Nursing to notify the Triage Coordinator in the RTOC via Voalte or Phone ext. [phone] when patient arrives to the unit. The Triage Coordinator will assign the admitting provider.    Consultants to be called upon arrival: Cardiology  Admission status: Observation.   Floor requested: Telemetry  If ICU transfer, name of intensivist case discussed with and pertinent input from critical care: Not applicable    The admitting provider is the point of contact for questions or concerns regarding patient's care.

## 2023-09-14 ENCOUNTER — APPOINTMENT (OUTPATIENT)
Dept: CARDIOLOGY | Facility: MEDICAL CENTER | Age: 63
DRG: 247 | End: 2023-09-14
Attending: INTERNAL MEDICINE
Payer: COMMERCIAL

## 2023-09-14 LAB
ACT BLD: 245 SEC (ref 74–137)
ACT BLD: 378 SEC (ref 74–137)
CHOLEST SERPL-MCNC: 131 MG/DL (ref 100–199)
EKG IMPRESSION: NORMAL
EKG IMPRESSION: NORMAL
HDLC SERPL-MCNC: 43 MG/DL
LDLC SERPL CALC-MCNC: 71 MG/DL
TRIGL SERPL-MCNC: 85 MG/DL (ref 0–149)
UFH PPP CHRO-ACNC: 0.41 IU/ML

## 2023-09-14 PROCEDURE — 99153 MOD SED SAME PHYS/QHP EA: CPT

## 2023-09-14 PROCEDURE — 93010 ELECTROCARDIOGRAM REPORT: CPT | Mod: 59 | Performed by: INTERNAL MEDICINE

## 2023-09-14 PROCEDURE — 99233 SBSQ HOSP IP/OBS HIGH 50: CPT | Performed by: INTERNAL MEDICINE

## 2023-09-14 PROCEDURE — 85520 HEPARIN ASSAY: CPT

## 2023-09-14 PROCEDURE — 027035Z DILATION OF CORONARY ARTERY, ONE ARTERY WITH TWO DRUG-ELUTING INTRALUMINAL DEVICES, PERCUTANEOUS APPROACH: ICD-10-PCS | Performed by: INTERNAL MEDICINE

## 2023-09-14 PROCEDURE — 700101 HCHG RX REV CODE 250

## 2023-09-14 PROCEDURE — 93458 L HRT ARTERY/VENTRICLE ANGIO: CPT | Mod: 26,59 | Performed by: INTERNAL MEDICINE

## 2023-09-14 PROCEDURE — B2111ZZ FLUOROSCOPY OF MULTIPLE CORONARY ARTERIES USING LOW OSMOLAR CONTRAST: ICD-10-PCS | Performed by: INTERNAL MEDICINE

## 2023-09-14 PROCEDURE — 700102 HCHG RX REV CODE 250 W/ 637 OVERRIDE(OP): Performed by: INTERNAL MEDICINE

## 2023-09-14 PROCEDURE — 99152 MOD SED SAME PHYS/QHP 5/>YRS: CPT | Performed by: INTERNAL MEDICINE

## 2023-09-14 PROCEDURE — 96372 THER/PROPH/DIAG INJ SC/IM: CPT

## 2023-09-14 PROCEDURE — 700117 HCHG RX CONTRAST REV CODE 255: Performed by: INTERNAL MEDICINE

## 2023-09-14 PROCEDURE — 97535 SELF CARE MNGMENT TRAINING: CPT

## 2023-09-14 PROCEDURE — 96366 THER/PROPH/DIAG IV INF ADDON: CPT

## 2023-09-14 PROCEDURE — 99233 SBSQ HOSP IP/OBS HIGH 50: CPT | Mod: 25 | Performed by: INTERNAL MEDICINE

## 2023-09-14 PROCEDURE — 80061 LIPID PANEL: CPT

## 2023-09-14 PROCEDURE — 700105 HCHG RX REV CODE 258: Performed by: INTERNAL MEDICINE

## 2023-09-14 PROCEDURE — A9270 NON-COVERED ITEM OR SERVICE: HCPCS

## 2023-09-14 PROCEDURE — 770020 HCHG ROOM/CARE - TELE (206)

## 2023-09-14 PROCEDURE — 700111 HCHG RX REV CODE 636 W/ 250 OVERRIDE (IP): Performed by: INTERNAL MEDICINE

## 2023-09-14 PROCEDURE — 700111 HCHG RX REV CODE 636 W/ 250 OVERRIDE (IP)

## 2023-09-14 PROCEDURE — 700102 HCHG RX REV CODE 250 W/ 637 OVERRIDE(OP)

## 2023-09-14 PROCEDURE — 93010 ELECTROCARDIOGRAM REPORT: CPT | Mod: 77,59 | Performed by: STUDENT IN AN ORGANIZED HEALTH CARE EDUCATION/TRAINING PROGRAM

## 2023-09-14 PROCEDURE — A9270 NON-COVERED ITEM OR SERVICE: HCPCS | Performed by: INTERNAL MEDICINE

## 2023-09-14 PROCEDURE — 93005 ELECTROCARDIOGRAM TRACING: CPT | Performed by: INTERNAL MEDICINE

## 2023-09-14 PROCEDURE — 85347 COAGULATION TIME ACTIVATED: CPT | Mod: 91

## 2023-09-14 PROCEDURE — 92928 PRQ TCAT PLMT NTRAC ST 1 LES: CPT | Mod: RC | Performed by: INTERNAL MEDICINE

## 2023-09-14 PROCEDURE — 4A023N7 MEASUREMENT OF CARDIAC SAMPLING AND PRESSURE, LEFT HEART, PERCUTANEOUS APPROACH: ICD-10-PCS | Performed by: INTERNAL MEDICINE

## 2023-09-14 RX ORDER — LIDOCAINE HYDROCHLORIDE 20 MG/ML
INJECTION, SOLUTION INFILTRATION; PERINEURAL
Status: COMPLETED
Start: 2023-09-14 | End: 2023-09-14

## 2023-09-14 RX ORDER — HEPARIN SODIUM 200 [USP'U]/100ML
INJECTION, SOLUTION INTRAVENOUS
Status: COMPLETED
Start: 2023-09-14 | End: 2023-09-14

## 2023-09-14 RX ORDER — CLOPIDOGREL 300 MG/1
TABLET, FILM COATED ORAL
Status: COMPLETED
Start: 2023-09-14 | End: 2023-09-14

## 2023-09-14 RX ORDER — ATORVASTATIN CALCIUM 80 MG/1
80 TABLET, FILM COATED ORAL EVERY EVENING
Status: DISCONTINUED | OUTPATIENT
Start: 2023-09-14 | End: 2023-09-15 | Stop reason: HOSPADM

## 2023-09-14 RX ORDER — CLOPIDOGREL BISULFATE 75 MG/1
75 TABLET ORAL DAILY
Status: DISCONTINUED | OUTPATIENT
Start: 2023-09-15 | End: 2023-09-15 | Stop reason: HOSPADM

## 2023-09-14 RX ORDER — CLOPIDOGREL 300 MG/1
600 TABLET, FILM COATED ORAL ONCE
Status: DISCONTINUED | OUTPATIENT
Start: 2023-09-14 | End: 2023-09-15 | Stop reason: HOSPADM

## 2023-09-14 RX ORDER — HEPARIN SODIUM 1000 [USP'U]/ML
INJECTION, SOLUTION INTRAVENOUS; SUBCUTANEOUS
Status: COMPLETED
Start: 2023-09-14 | End: 2023-09-14

## 2023-09-14 RX ORDER — MIDAZOLAM HYDROCHLORIDE 1 MG/ML
INJECTION INTRAMUSCULAR; INTRAVENOUS
Status: COMPLETED
Start: 2023-09-14 | End: 2023-09-14

## 2023-09-14 RX ORDER — ASPIRIN 81 MG/1
81 TABLET ORAL DAILY
Status: DISCONTINUED | OUTPATIENT
Start: 2023-09-15 | End: 2023-09-15 | Stop reason: HOSPADM

## 2023-09-14 RX ORDER — SODIUM CHLORIDE 9 MG/ML
3 INJECTION, SOLUTION INTRAVENOUS CONTINUOUS
Status: ACTIVE | OUTPATIENT
Start: 2023-09-14 | End: 2023-09-14

## 2023-09-14 RX ORDER — VERAPAMIL HYDROCHLORIDE 2.5 MG/ML
INJECTION, SOLUTION INTRAVENOUS
Status: COMPLETED
Start: 2023-09-14 | End: 2023-09-14

## 2023-09-14 RX ADMIN — SODIUM CHLORIDE: 9 INJECTION, SOLUTION INTRAVENOUS at 10:22

## 2023-09-14 RX ADMIN — IOHEXOL 70 ML: 350 INJECTION, SOLUTION INTRAVENOUS at 13:30

## 2023-09-14 RX ADMIN — CLOPIDOGREL BISULFATE 600 MG: 300 TABLET, FILM COATED ORAL at 14:13

## 2023-09-14 RX ADMIN — ALLOPURINOL 300 MG: 300 TABLET ORAL at 17:24

## 2023-09-14 RX ADMIN — ASPIRIN 81 MG 324 MG: 81 TABLET ORAL at 05:52

## 2023-09-14 RX ADMIN — HEPARIN SODIUM 14 UNITS/KG/HR: 5000 INJECTION, SOLUTION INTRAVENOUS at 07:14

## 2023-09-14 RX ADMIN — HEPARIN SODIUM: 1000 INJECTION, SOLUTION INTRAVENOUS; SUBCUTANEOUS at 14:03

## 2023-09-14 RX ADMIN — HEPARIN SODIUM: 1000 INJECTION, SOLUTION INTRAVENOUS; SUBCUTANEOUS at 13:27

## 2023-09-14 RX ADMIN — NITROGLYCERIN 10 ML: 20 INJECTION INTRAVENOUS at 13:27

## 2023-09-14 RX ADMIN — ATORVASTATIN CALCIUM 80 MG: 80 TABLET, FILM COATED ORAL at 17:23

## 2023-09-14 RX ADMIN — FENTANYL CITRATE 100 MCG: 50 INJECTION, SOLUTION INTRAMUSCULAR; INTRAVENOUS at 14:02

## 2023-09-14 RX ADMIN — HEPARIN SODIUM 2000 UNITS: 1000 INJECTION, SOLUTION INTRAVENOUS; SUBCUTANEOUS at 00:24

## 2023-09-14 RX ADMIN — LIDOCAINE HYDROCHLORIDE: 20 INJECTION, SOLUTION INFILTRATION; PERINEURAL at 13:27

## 2023-09-14 RX ADMIN — MIDAZOLAM 2 MG: 1 INJECTION, SOLUTION INTRAMUSCULAR; INTRAVENOUS at 13:44

## 2023-09-14 RX ADMIN — VERAPAMIL HYDROCHLORIDE 5 MG: 2.5 INJECTION, SOLUTION INTRAVENOUS at 13:27

## 2023-09-14 RX ADMIN — TAMSULOSIN HYDROCHLORIDE 0.4 MG: 0.4 CAPSULE ORAL at 17:24

## 2023-09-14 RX ADMIN — AMLODIPINE BESYLATE 5 MG: 5 TABLET ORAL at 05:53

## 2023-09-14 RX ADMIN — HEPARIN SODIUM 2000 UNITS: 200 INJECTION, SOLUTION INTRAVENOUS at 13:28

## 2023-09-14 ASSESSMENT — ENCOUNTER SYMPTOMS
CLAUDICATION: 0
SHORTNESS OF BREATH: 1
DOUBLE VISION: 0
BLURRED VISION: 0
EYE PAIN: 0
PALPITATIONS: 0

## 2023-09-14 ASSESSMENT — FIBROSIS 4 INDEX: FIB4 SCORE: 1.53

## 2023-09-14 NOTE — PROGRESS NOTES
"Cardiology Progress Note:    Millicent Kirby M.D.  Date & Time note created:    9/14/2023   10:03 AM     Referring MD:  Dr. Nettles    Patient ID:   Name:             Jhonatan Fonseca   YOB: 1960  Age:                 63 y.o.  male   MRN:               [id number]                                                             Chief Complaint / Reason for consult:  ACS    History of Present Illness:    This is a 63 years old man with hypertension, hyperlipidemia, family history of premature CAD, sleep apnea on CPAP, hyperglycemia, obesity, presented to the hospital with chest pain and diagnosis of acute coronary syndrome.    I have personally interpreted EKG today with patient, there is no evidence of acute coronary syndrome, no evidence of prior infarct, normal SC and QT interval, no significant conduction disease. Sinus rhythm.      No chest pain at this time.    Overnight events:  No acute events overnight.  No telemetry events.      Review of Systems:      Constitutional: Denies fevers, Denies weight changes  Eyes: Denies changes in vision, no eye pain  Ears/Nose/Throat/Mouth: Denies nasal congestion or sore throat   Cardiovascular: no chest pain, no palpitations   Respiratory: no shortness of breath , Denies cough  Gastrointestinal/Hepatic: Denies abdominal pain, nausea, vomiting, diarrhea, constipation or GI bleeding   Genitourinary: Denies dysuria or frequency  Musculoskeletal/Rheum: Denies  joint pain and swelling   Skin: Denies rash  Neurological: Denies headache, confusion, memory loss or focal weakness/parasthesias  Psychiatric: denies mood disorder   Endocrine: Juliana thyroid problems  Heme/Oncology/Lymph Nodes: Denies enlarged lymph nodes, denies brusing or known bleeding disorder  All other systems were reviewed and are negative (AMA/CMS criteria)                Past Medical History:   Past Medical History:   Diagnosis Date    Anesthesia 08/13/2019    \"I was told I turned green on table " "during foot surgery about 20 years ago.\"    Arthritis 08/13/2019    Left Knee, Right Hand     Breath shortness 08/13/2019    Pt. states overweight & out of shape.    Celiac artery aneurysm (HCC)     Chickenpox     Diabetes (HCC) 08/14/2020    Takes Metformin to control A1C but not actually diagnosed    Dyslipidemia     High cholesterol     Hypertension     Kidney stone     Sleep apnea     CPAP USE    Snoring     DX DK     Active Hospital Problems    Diagnosis     Essential hypertension [I10]      Priority: High    NSTEMI (non-ST elevated myocardial infarction) ([facility]) [I21.4]     Chest pain [R07.9]     BMI 36.0-36.9,adult [Z68.36]     DK (obstructive sleep apnea) [G47.33]     Pure hypercholesterolemia [E78.00]        Past Surgical History:  Past Surgical History:   Procedure Laterality Date    INCISION HERNIA REPAIR  8/19/2020    Procedure: REPAIR, HERNIA, INCISIONAL - W/MESH;  Surgeon: Elie Morales M.D.;  Location: SURGERY [facility];  Service: General    AORTOFEMORAL BYPASS N/A 8/15/2019    Procedure: CREATION, bypass arterial, celiac artery aneurism repair;  Surgeon: Elie Morales M.D.;  Location: SURGERY [facility];  Service: General    BONE SPUR EXCISION      KNEE ARTHROSCOPY      repair micscus tear    LITHOTRIPSY      OTHER      Surgery for right shoulder dislocation and torn ligaments 1974.    OTHER ORTHOPEDIC SURGERY      Left Foot Surgery x3 from broken foot in high school.    OTHER ORTHOPEDIC SURGERY      Right Achilles Surgery       Hospital Medications:    Current Facility-Administered Medications:     allopurinol (Zyloprim) tablet 300 mg, 300 mg, Oral, Q EVENING, Candido Hale M.D., 300 mg at 09/13/23 1706    amLODIPine (Norvasc) tablet 5 mg, 5 mg, Oral, DAILY, Candido Hale M.D., 5 mg at 09/14/23 0553    aspirin (Asa) tablet 325 mg, 325 mg, Oral, DAILY **OR** aspirin (Asa) chewable tab 324 mg, 324 mg, Oral, DAILY, 324 mg at 09/14/23 0552 **OR** aspirin (Asa) suppository 300 mg, 300 " mg, Rectal, DAILY, Candido Hale M.D.    atorvastatin (Lipitor) tablet 40 mg, 40 mg, Oral, Q EVENING, Candido Hale M.D., 40 mg at 09/13/23 1706    senna-docusate (Pericolace Or Senokot S) 8.6-50 MG per tablet 2 Tablet, 2 Tablet, Oral, BID **AND** polyethylene glycol/lytes (Miralax) PACKET 1 Packet, 1 Packet, Oral, QDAY PRN **AND** magnesium hydroxide (Milk Of Magnesia) suspension 30 mL, 30 mL, Oral, QDAY PRN **AND** bisacodyl (Dulcolax) suppository 10 mg, 10 mg, Rectal, QDAY PRN, Candido Hale M.D.    heparin infusion 25,000 units in 500 mL 0.45% NACL, 0-30 Units/kg/hr (Adjusted), Intravenous, Continuous, Candido Hale M.D., Last Rate: 25.6 mL/hr at 09/14/23 0800, 14 Units/kg/hr at 09/14/23 0800    tamsulosin (Flomax) capsule 0.4 mg, 0.4 mg, Oral, Q EVENING, Candido Hale M.D., 0.4 mg at 09/13/23 1706    acetaminophen (Tylenol) tablet 650 mg, 650 mg, Oral, Q6HRS PRN, Candido Hale M.D.    heparin injection 2,000 Units, 2,000 Units, Intravenous, PRN, Candido Hale M.D., 2,000 Units at 09/14/23 0024    hydrALAZINE (Apresoline) injection 10 mg, 10 mg, Intravenous, Q4HRS PRN, Candido Hale M.D.    ondansetron (Zofran ODT) dispertab 4 mg, 4 mg, Oral, Q4HRS PRN, Candido Hale M.D.    nitroglycerin (Nitrostat) tablet 0.4 mg, 0.4 mg, Sublingual, Q5 MIN PRN, Candido Hale M.D.    promethazine (Phenergan) tablet 12.5-25 mg, 12.5-25 mg, Oral, Q4HRS PRN, Candido Hale M.D.    promethazine (Phenergan) suppository 12.5-25 mg, 12.5-25 mg, Rectal, Q4HRS PRN, Candido Hale M.D.    prochlorperazine (Compazine) injection 5-10 mg, 5-10 mg, Intravenous, Q4HRS PRN, Candido Hale M.D.    ondansetron (Zofran) syringe/vial injection 4 mg, 4 mg, Intravenous, Q4HRS PRN, Candido Hale M.D.    NS infusion, , Intravenous, Continuous, Ja Delatorre M.D., Last Rate: 75 mL/hr at 09/13/23 2211, New Bag at 09/13/23 2211    metoprolol SR (Toprol XL) tablet 25 mg, 25 mg, Oral, Q DAY, Ja CALLEJAS  GINA Delatorre, 25 mg at 09/13/23 2205    Current Outpatient Medications:  Medications Prior to Admission   Medication Sig Dispense Refill Last Dose    flecainide (TAMBOCOR) 100 MG Tab Take 100 mg by mouth 2 times a day.   9/11/2023    atorvastatin (LIPITOR) 40 MG Tab Take 40 mg by mouth every evening.   9/11/2023    triamterene-hctz (MAXZIDE-25/DYAZIDE) 37.5-25 MG Tab Take 1 Tablet by mouth every evening.   9/11/2023    NON SPECIFIED Take 1 Capsule by mouth 3 times a day. MCT oil (OTC)   9/11/2023    Cholecalciferol (D3 PO) Take 1 Capsule by mouth every evening.   9/11/2023    Ferrous Sulfate (IRON PO) Take 1 Tablet by mouth every evening.   9/11/2023    Menaquinone-7 (VITAMIN K2 PO) Take 1 Capsule by mouth every evening.   9/11/2023    ibuprofen (MOTRIN) 200 MG Tab Take 400 mg by mouth every 6 hours as needed. Indications: Pain   9/10/2023    fluticasone (FLONASE) 50 MCG/ACT nasal spray USE 1 SPRAY IN EACH NOSTRIL EVERY DAY (Patient taking differently: Administer 1 Spray into affected nostril(S) every day.) 16 g 1 9/11/2023    amLODIPine (NORVASC) 5 MG Tab TAKE 1 TABLET BY MOUTH  DAILY (Patient taking differently: Take 5 mg by mouth every day.) 90 tablet 3 9/11/2023    allopurinol (ZYLOPRIM) 300 MG Tab Take 1 tablet by mouth every day. (Patient taking differently: Take 300 mg by mouth every evening.) 90 tablet 1 9/11/2023    tamsulosin (FLOMAX) 0.4 MG capsule Take 0.4 mg by mouth every evening.   9/11/2023    loratadine (CLARITIN) 10 MG Tab Take 10 mg by mouth as needed. Indications: Hayfever   9/11/2023    Omega-3 Fatty Acids (FISH OIL) 1000 MG Cap capsule Take 1,000 mg by mouth 2 times a day.   9/11/2023    therapeutic multivitamin-minerals (THERAGRAN-M) Tab Take 1 Tab by mouth every day.   9/11/2023       Medication Allergy:  Allergies   Allergen Reactions    Lisinopril Hives       Family History:  Family History   Problem Relation Age of Onset    Cancer Brother         parathyroid    Sleep Apnea Brother      "Diabetes Brother     Sleep Apnea Brother     Diabetes Father        Social History:  Social History     Socioeconomic History    Marital status:      Spouse name: Not on file    Number of children: Not on file    Years of education: Not on file    Highest education level: Not on file   Occupational History    Not on file   Tobacco Use    Smoking status: Never    Smokeless tobacco: Never   Vaping Use    Vaping Use: Never used   Substance and Sexual Activity    Alcohol use: Yes     Comment: rarely    Drug use: No    Sexual activity: Yes     Partners: Female   Other Topics Concern     Service No    Blood Transfusions No    Caffeine Concern No    Occupational Exposure No    Hobby Hazards Yes    Sleep Concern No    Stress Concern No    Weight Concern Yes    Special Diet No    Back Care No    Exercise Yes    Bike Helmet No    Seat Belt Yes    Self-Exams No   Social History Narrative    Not on file     Social Determinants of Health     Financial Resource Strain: Not on file   Food Insecurity: Not on file   Transportation Needs: Not on file   Physical Activity: Not on file   Stress: Not on file   Social Connections: Not on file   Intimate Partner Violence: Not on file   Housing Stability: Not on file         Physical Exam:  Vitals/ General Appearance:   Weight/BMI: Body mass index is 37.42 kg/m².  /70   Pulse 64   Temp 36.8 °C (98.2 °F) (Temporal)   Resp 17   Ht 1.778 m (5' 10\")   Wt 118 kg (260 lb 12.9 oz)   SpO2 91%   Vitals:    09/13/23 2336 09/14/23 0315 09/14/23 0515 09/14/23 0743   BP: 116/70 115/75  119/70   Pulse: 68 69  64   Resp: 18 18  17   Temp: 36.8 °C (98.2 °F) 36.6 °C (97.9 °F)  36.8 °C (98.2 °F)   TempSrc: Temporal Temporal  Temporal   SpO2: 89% 91%  91%   Weight:   118 kg (260 lb 12.9 oz)    Height:         Oxygen Therapy:  Pulse Oximetry: 91 %, O2 (LPM): 0, O2 Delivery Device: None - Room Air    Constitutional:   Well developed, Well nourished, No acute distress  HENMT:  " Normocephalic, Atraumatic, Oropharynx moist mucous membranes, No oral exudates, Nose normal.  No thyromegaly.  Eyes:  EOMI, Conjunctiva normal, No discharge.  Neck:  Normal range of motion, No cervical tenderness,  no JVD.  Cardiovascular:  Normal heart rate, Normal rhythm, No murmurs, No rubs, No gallops.   Extremitites with intact distal pulses, no cyanosis, or edema.  Lungs:  Normal breath sounds, breath sounds clear to auscultation bilaterally,  no rales, no rhonchi, no wheezing.   Abdomen: Bowel sounds normal, Soft, No tenderness, No guarding, No rebound, No masses, No hepatosplenomegaly.  Skin: Warm, Dry, No erythema, No rash, no induration.  Neurologic: Alert & oriented x 3, No focal deficits noted, cranial nerves II through X are intact.  Psychiatric: Affect normal, Judgment normal, Mood normal.      MDM (Data Review):     Records reviewed and summarized in current documentation    Lab Data Review:  Recent Results (from the past 24 hour(s))   TROPONIN    Collection Time: 23 10:52 AM   Result Value Ref Range    Troponin T 352 (H) 6 - 19 ng/L   aPTT    Collection Time: 23 10:52 AM   Result Value Ref Range    APTT 28.3 24.7 - 36.0 sec   Prothrombin Time    Collection Time: 23 10:52 AM   Result Value Ref Range    PT 14.7 (H) 12.0 - 14.6 sec    INR 1.09 0.87 - 1.13   Heparin Xa (Unfractionated)    Collection Time: 23 10:52 AM   Result Value Ref Range    Heparin Xa (UFH) <0.10 IU/mL   Troponin - STAT Once    Collection Time: 23  1:39 PM   Result Value Ref Range    Troponin T 388 (H) 6 - 19 ng/L   EKG in four (4) hours    Collection Time: 23  2:23 PM   Result Value Ref Range    Report       [facility]    Test Date:  2023  Pt Name:    JEOVANY RDZ                  Department: 171  MRN:        [id number]                      Room:       T724  Gender:     Male                         Technician: MIRACLE  :        1960                   Requested By:ROOPA FUNEZ  RAVEN  Order #:    [id number]                    Reading MD:    Measurements  Intervals                                Axis  Rate:       68                           P:          26  MA:         204                          QRS:        -10  QRSD:       102                          T:          2  QT:         412  QTc:        439    Interpretive Statements  Sinus rhythm  Abnormal R-wave progression, early transition  Compared to ECG 2023 08:08:33  Sinus arrhythmia no longer present  Myocardial infarct finding no longer present     EKG - STAT Once    Collection Time: 23  2:23 PM   Result Value Ref Range    Report       [facility]    Test Date:  2023  Pt Name:    JEOVANY RDZ                  Department: 171  MRN:        [id number]                      Room:       [facility]  Gender:     Male                         Technician: [facility]  :        1960                   Requested By:ROOPA CARBAJAL  Order #:    [id number]                    Reading MD: Ja Delatorre MD    Measurements  Intervals                                Axis  Rate:       68                           P:          26  MA:         204                          QRS:        -10  QRSD:       102                          T:          2  QT:         412  QTc:        439    Interpretive Statements  Sinus rhythm  LEFT VENTRICULAR HYPERTROPHY  Early transition    Electronically Signed On 2023 00:43:25 PDT by Ja Delatorre MD     EC-ECHOCARDIOGRAM COMPLETE W/O CONT    Collection Time: 23  4:39 PM   Result Value Ref Range    Left Ventrical Ejection Fraction 65    TROPONIN    Collection Time: 23  9:06 PM   Result Value Ref Range    Troponin T 593 (H) 6 - 19 ng/L   Heparin Anti-Xa    Collection Time: 23  9:06 PM   Result Value Ref Range    Heparin Xa (UFH) 0.16 IU/mL   Lipid Profile (Tomorrow AM)    Collection Time: 23  1:40 AM   Result Value Ref Range    Cholesterol,Tot 131 100 - 199 mg/dL    Triglycerides 85  0 - 149 mg/dL    HDL 43 >=40 mg/dL    LDL 71 <100 mg/dL   Heparin Xa (Unfractionated)    Collection Time: 09/14/23  6:14 AM   Result Value Ref Range    Heparin Xa (UFH) 0.41 IU/mL       Imaging/Procedures Review:    Chest Xray:  Reviewed    EKG:   As in HPI.     MDM (Assessment and Plan):     Active Hospital Problems    Diagnosis     Essential hypertension [I10]      Priority: High    NSTEMI (non-ST elevated myocardial infarction) (HCC) [I21.4]     Chest pain [R07.9]     BMI 36.0-36.9,adult [Z68.36]     DK (obstructive sleep apnea) [G47.33]     Pure hypercholesterolemia [E78.00]          Overall, there is strong suspicion for acute coronary syndrome as patient does have high risk for obstructive coronary artery disease with prior risk factors and clinical presentation is concerned for acute coronary syndrome/unstable angina.  At this time, I recommend further invasive cardiac work-up with a cardiac catheterization/coronary angiogram to further delineate coronary anatomy to facilitate appropriate treatment for lifesaving measures.  Risks and benefits of the procedure were explained to patient. Risks include infection, bleeding, stroke, death, etc.  At this time, patient has agreed to proceed to undergo this invasive cardiac procedure.    Will continue heparin gtt with APTT goal from 60-80.  Nitro gtt with titration to chest pain free if persistent chest pain.  Optimize medical therapy as well with Metoprolol 25 mg po bid, ASA, Lipitor 80 mg po daily  I also recommend a transthoracic echocardiogram to further assess cardiac function and valvular function.  Consider taking patient to cath lab urgently if chest pain is not relieved with medicines or patient becomes hemodynamically unstable.  Otherwise, will perform coronary angiogram ASAP via our cath lab protocol.        Thank you for referring this patient to our cardiology service.  We will follow patient with you.      Millicent Kirby MD.   Cardiology Inpatient  Service.  [facility].  [phone].  Monse Avina.

## 2023-09-14 NOTE — CARE PLAN
The patient is Stable - Low risk of patient condition declining or worsening    Shift Goals  Clinical Goals: STABLE V/S, HEPARIN DRIP, CHECK LAB VALUES  Patient Goals: REST    Progress made toward(s) clinical / shift goals:      Problem: Knowledge Deficit - Standard  Goal: Patient and family/care givers will demonstrate understanding of plan of care, disease process/condition, diagnostic tests and medications  Description: Target End Date:  1-3 days or as soon as patient condition allows    Document in Patient Education    1.  Patient and family/caregiver oriented to unit, equipment, visitation policy and means for communicating concern  2.  Complete/review Learning Assessment  3.  Assess knowledge level of disease process/condition, treatment plan, diagnostic tests and medications  4.  Explain disease process/condition, treatment plan, diagnostic tests and medications  Outcome: Not Progressing       Patient is not progressing towards the following goals:      Problem: Knowledge Deficit - Standard  Goal: Patient and family/care givers will demonstrate understanding of plan of care, disease process/condition, diagnostic tests and medications  Description: Target End Date:  1-3 days or as soon as patient condition allows    Document in Patient Education    1.  Patient and family/caregiver oriented to unit, equipment, visitation policy and means for communicating concern  2.  Complete/review Learning Assessment  3.  Assess knowledge level of disease process/condition, treatment plan, diagnostic tests and medications  4.  Explain disease process/condition, treatment plan, diagnostic tests and medications  Outcome: Not Progressing     Problem: Optimal Care for the Acute Coronary Syndrome Patient  Goal: Optimal Care for the Acute Coronary Syndrome Patient  Description: Target End Date:  1 to 3 days  Outcome: Not Progressing  Goal: Potential Interventions for the Acute Coronary Syndrome Patient  Description: Target End  Date:  1 to 3 days  Outcome: Not Progressing     Problem: Optimal Care for the Cath Lab Patient  Goal: Optimal Care for the Cath Lab Patient  Description: Target End Date:  1 to 3 days  Outcome: Not Progressing     Problem: Discharge Care for the Acute Coronary Syndrome Patient  Goal: Patient will be discharged on guideline directed medical therapy  Description: Target End Date:  1 to 3 days  Outcome: Not Progressing     Problem: Respiratory  Goal: Patient will achieve/maintain optimum respiratory ventilation and gas exchange  Description: Target End Date:  Prior to discharge or change in level of care    Document on Assessment flowsheet    1.  Assess and monitor rate, rhythm, depth and effort of respiration  2.  Breath sounds assessed qshift and/or as needed  3.  Assess O2 saturation, administer/titrate oxygen as ordered  4.  Position patient for maximum ventilatory efficiency  5.  Turn, cough, and deep breath with splinting to improve effectiveness  6.  Collaborate with RT to administer medication/treatments per order  7.  Encourage use of incentive spirometer and encourage patient to cough after use and utilize splinting techniques if applicable  8.  Airway suctioning  9.  Monitor sputum production for changes in color, consistency and frequency  10. Perform frequent oral hygiene  11. Alternate physical activity with rest periods  Outcome: Not Progressing     Problem: Pain - Standard  Goal: Alleviation of pain or a reduction in pain to the patient’s comfort goal  Description: Target End Date:  Prior to discharge or change in level of care    Document on Vitals flowsheet    1.  Document pain using the appropriate pain scale per order or unit policy  2.  Educate and implement non-pharmacologic comfort measures (i.e. relaxation, distraction, massage, cold/heat therapy, etc.)  3.  Pain management medications as ordered  4.  Reassess pain after pain med administration per policy  5.  If opiods administered assess  patient's response to pain medication is appropriate per POSS sedation scale  6.  Follow pain management plan developed in collaboration with patient and interdisciplinary team (including palliative care or pain specialists if applicable)  Outcome: Not Progressing   Npo post midnight for left heart cath in am.

## 2023-09-14 NOTE — PROCEDURES
Cardiac Catheterization and Percutaneous Intervention Procedure Report    9/14/2023    Referring MD:     Indication for procedure: ACS <24 hours    Procedures:  Right and left coronary arteriograms  Left heart catheterization  Angioplasty and placement of a 2.5 by 16mm Synergy drug-eluting stent in proximal  right coronary artery.  Angioplasty and placement of a 2.25 by 8 mm Synergy drug-eluting  stent in distal  right coronary artery.    Final diagnosis:   Single vessel coronary artery disease.  Successful percutaneous intervention of right coronary artery.    Recommendations: Guideline directed medical therapy and risk factor management      Coronary arteriograms:  Left main: normal  Left anterior descending: Luminal irregularities,large diagonal branch comes off in midportion.  Mid LAD is tortuous.    Left circumflex: Luminal irregularities, diffuse mild disease.  Large codominant vessel gives 2 marginal branches.  Right coronary: Smaller codominant vessel, has 80% stenosis in the proximal portion, 95% stenosis in distal portion    Left Heart Catheterization:  Left Ventriculogram: Not performed  Left Ventricular EDP: 18 mm Hg   Aortic Valve Gradient: No significant AV gradient noted    Procedure details:  Jhonatan Fonseca was brought to the cardiac catheterization lab where the right wrist was prepped and draped in the usual manner for cardiac catheterization.  Timeout was performed.  The area was anesthetized with lidocaine and a 5/6 FR sheath was inserted into the right radial artery without difficulty. A #3.5 left Regi catheter was advanced to the ostia of the Left coronary artery and arteriograms were recorded.   A #4 right Regi catheter was advanced to the ostia of the right coronary artery and arteriograms were recorded. Aortic valve was crossed using #4 right Regi catheter left heart catheterization was performed.  Patient underwent percutaneous coronary revascularization as outlined below.   "At the completion of the case the sheath was removed and hemostasis achieved utilizing a radial compression band .  Patient was pain-free and hemodynamically stable at the completion of the case.  There were no apparent complications.    Interventional Procedure:     Given the patient's clinical presentation and coronary anatomy, PCI was indicated and we proceeded with the intervention as detailed below.    Indication for PCI:  NSTE- ACD    Pre: 80 %, 12 mm length, SAW 3 flow  Post: 0%, SAW 3 flow    Lesion complexity  Non-High  Severe calcification No  Bifurcation  No    Guide catheter: AR2 was advanced to the ostia of the right coronary artery.    Guide wire: A 0.014\" mm  Runthrough was advanced into the artery and crossed the lesion.    Both the proximal, distal vessel dilated using 2.0 x 15 mm compliant balloon.  A 2.5 x 16 mm Synergy drug-eluting stent was placed in the proximal RCA.  2.25 x 12 mm Synergy drug-eluting stent was placed in distal RCA with good result.    Anticoagulant: Heparin  Antiplatelet: Clopidogrel  EBL <25 cc  Complications: none  Specimens: none  Contrast: 70 cc  Fluorotime : see cath lab flowsheet      Sedation: I supervised moderate sedation over a trained independent observer.    Sedation start time: 13:34  End time: 14:08      Electronically signed by   Girish Orona M.D., FERDINAND  Interventional cardiologist  9/14/2023  2:17 PM          "

## 2023-09-14 NOTE — PROGRESS NOTES
Patient transferred from [facility] for cardiology evaluation.  He was admitted at [facility] with complaint of chest pain and his troponin has been increasing.  I evaluated and examined him at the bedside he reported that he does not have any symptoms of chest pain and last name he felt chest pain was last night.  He denies any complaint of shortness of breath or any other acute complaints.  He has been running heparin gtt. at this time.  I discussed plan of care with bedside RN on telemetry floor.    I updated on-call cardiologist Dr. Delatorre regarding patient arrival to [facility].  I updated him that patient is currently on heparin gtt. and he is n.p.o.  Patient initial troponin was 41 and it has been increasing now his current troponin level is 388.    For full details please see H&P note.

## 2023-09-14 NOTE — PROGRESS NOTES
Hospital Medicine Daily Progress Note    Date of Service  9/14/2023    Chief Complaint  Jhonatan Fonseca is a 63 y.o. male admitted 9/13/2023 with chest pain     Hospital Course  This is a 63 y.o. male who presented 9/12/2023 with chest pain.  Patient was initially  admitted at [facility] and evaluated for chest pain. Chest pain resolved overnight. However, he did have an initial elevation of troponins (41, 133, 188, 352).  Echocardiogram with EF of 65%, normal systolic function. Patient was started on heparin for ACS and case was discussed with cardiologist on call, Dr Weinstein, who recommended transfer to [facility] for further evaluation and possible cardiac catheterization.     Interval Problem Update  Patient seen and examined, resting n bed, denies chest pain, cardiology following case discussed plan is for cardiac cath today.  Cont on heparin drip  Appreciate rec.  Close monitoring on tele for decompensation     I have discussed this patient's plan of care and discharge plan at IDT rounds today with Case Management, Nursing, Nursing leadership, and other members of the IDT team.    Consultants/Specialty  cardiology    Code Status  Full Code    Disposition  The patient is not medically cleared for discharge to home or a post-acute facility.      I have placed the appropriate orders for post-discharge needs.    Review of Systems  Review of Systems   Eyes:  Negative for blurred vision, double vision and pain.   Respiratory:  Positive for shortness of breath.    Cardiovascular:  Positive for chest pain. Negative for palpitations and claudication.   Genitourinary:  Negative for dysuria, frequency and hematuria.   All other systems reviewed and are negative.       Physical Exam  Temp:  [36.6 °C (97.9 °F)-37.1 °C (98.8 °F)] 36.9 °C (98.4 °F)  Pulse:  [64-87] 74  Resp:  [17-20] 17  BP: (115-132)/(70-87) 120/81  SpO2:  [89 %-95 %] 93 %    Physical Exam  Vitals and nursing note reviewed.   Constitutional:        Appearance: Normal appearance.   Eyes:      General: No scleral icterus.        Right eye: No discharge.         Left eye: No discharge.   Cardiovascular:      Rate and Rhythm: Normal rate and regular rhythm.      Pulses: Normal pulses.      Heart sounds: Normal heart sounds.   Pulmonary:      Effort: Pulmonary effort is normal.      Breath sounds: Normal breath sounds. No wheezing, rhonchi or rales.   Abdominal:      General: Abdomen is flat. Bowel sounds are normal. There is no distension.      Tenderness: There is no abdominal tenderness. There is no guarding or rebound.   Neurological:      General: No focal deficit present.      Mental Status: He is alert and oriented to person, place, and time. Mental status is at baseline.         Fluids    Intake/Output Summary (Last 24 hours) at 9/14/2023 1344  Last data filed at 9/14/2023 0739  Gross per 24 hour   Intake 380 ml   Output 1 ml   Net 379 ml       Laboratory  Recent Labs     09/12/23 2132 09/13/23  0124   WBC 12.2* 10.2   RBC 4.96 4.76   HEMOGLOBIN 14.4 13.8*   HEMATOCRIT 44.2 41.9*   MCV 89.1 88.0   MCH 29.0 29.0   MCHC 32.6 32.9   RDW 48.8 48.1   PLATELETCT 239 235   MPV 10.4 10.4     Recent Labs     09/12/23 2132 09/13/23  0124   SODIUM 140 139   POTASSIUM 3.7 4.0   CHLORIDE 103 105   CO2 24 25   GLUCOSE 107* 110*   BUN 16 16   CREATININE 0.87 0.79   CALCIUM 10.4* 10.1     Recent Labs     09/13/23  1052   APTT 28.3   INR 1.09         Recent Labs     09/13/23  0124 09/14/23  0140   TRIGLYCERIDE 53 85   HDL 52 43   LDL 72 71       Imaging  CL-LEFT HEART CATHETERIZATION WITH POSSIBLE INTERVENTION    (Results Pending)        Assessment/Plan  NSTEMI (non-ST elevated myocardial infarction) (HCC)- (present on admission)  Assessment & Plan  Cardiology following case discussed with cardiology   Plan is for cardiac cath tomorrow     Echocardiogram with EF of 65%, normal systolic function.   Close monitoring on tele for decompensation       BMI 36.0-36.9,adult-  (present on admission)  Assessment & Plan  - Discussed diet and lifestyle modifications with patient  - Patient will need to follow up with PCP for outpatient management       Pure hypercholesterolemia- (present on admission)  Assessment & Plan  Atorvastatin    DK (obstructive sleep apnea)- (present on admission)  Assessment & Plan  Nocturnal CPAP    Essential hypertension- (present on admission)  Assessment & Plan  Continue with amlodipine-monitor closely as patient did have some hypotension admission         VTE prophylaxis:    therapeutic anticoagulation with weight-based heparin gtt, pharmacy to adjust PRN      The very real possibilty of a deterioration of this patient's condition required the highest level of my preparedness for sudden, emergent intervention.  I provided services, which included medication orders, frequent reevaluations of the patient's condition and response to treatment, ordering and reviewing test results, and discussing the case with various consultants.  The  time associated with the care of the patient was 51 minutes.   I have performed a physical exam and reviewed and updated ROS and Plan today (9/14/2023). In review of yesterday's note (9/13/2023), there are no changes except as documented above.

## 2023-09-14 NOTE — PROGRESS NOTES
Assumed care on patient, alert and pleasant, cardiologist at bedside to discuss plan of care to patient, npo after midnight for left heart cath in am, monitor v/s, check lab values, heparin drip, patient verbalized understanding.    Tele on and heart rhythm and rate checked on monitor.     Provided patient with dinner and water.    Side rails x 2, bed to lowest level and alarm on, appropriate to use call light for assistance, will monitor.

## 2023-09-14 NOTE — THERAPY
09/14/23 0824   Interdisciplinary Plan of Care Collaboration   Collaboration Comments Met w/ pt at bedside.  Educated pt regarding post NSTEMI activity, therex, talk test, PRE.  Pt able to verbalize good understanding.  Handout given and reviewed.  All questions answered to pts satisfaction.  Pt denies any mobility issues.  PT for d/c needs.

## 2023-09-14 NOTE — PROGRESS NOTES
4 Eyes Skin Assessment Completed by ANDREIA louis and ANDREIA kan.    Head WDL  Ears WDL  Nose WDL  Mouth WDL  Neck WDL  Breast/Chest WDL  Shoulder Blades WDL  Spine WDL  (R) Arm/Elbow/Hand WDL  (L) Arm/Elbow/Hand WDL  Abdomen WDL  Groin WDL  Scrotum/Coccyx/Buttocks WDL  (R) Leg WDL Bilat shins discolored tan secondary to poor circ  (L) Leg WDL  (R) Heel/Foot/Toe WDL  (L) Heel/Foot/Toe WDL          Devices In Places Tele Box      Interventions In Place N/A    Possible Skin Injury No    Pictures Uploaded Into Epic N/A  Wound Consult Placed N/A  RN Wound Prevention Protocol Ordered No

## 2023-09-14 NOTE — CONSULTS
Cardiology Initial Consultation    Date of Service  9/13/2023    Referring Physician  CHAPIN Rees    Reason for Consultation  NSTEMI    History of Presenting Illness  Alan Fonseca is a 63 y.o. male with a past medical history of SVT, Robert calcification (calcium score 534) hypertension, dyslipidemia, family history of premature coronary artery disease, DK on CPAP, glucose intolerance, nephrolithiasis, prostatism and obesity who was transferred from [facility] today 9/13/2023 for ongoing management of NSTEMI.    The patient presented to [facility] yesterday 9/12/2023 after the development of persistent upper left chest pressure, radiation to his left jaw and subsequent development of flushing and diaphoresis.  His wife took him to the emergency room.  EKG showed no acute changes.  Serial troponin levels were consistent with ACS.  The patient received aspirin, sublingual nitroglycerin and started on IV heparin with subsequent resolution of his chest pain symptoms.  Currently is asymptomatic.    The patient had developed his chest discomfort symptoms with left jaw pain around 5:30 PM after watching his grandson's karate slight workout.  He had had somewhat similar symptoms with his previous episodes of SVT.  He went home.  He checked his blood pressure and it was elevated but his heart rate remained normal.  He took 2 Advil.  He monitored his symptoms for a few hours but developed flushing and sweaty palms prompting him to have his wife take him to the emergency room.    The patient has been on amlodipine, atorvastatin and aspirin for years while living in [place].    The patient moved from the [facility] to [place] approximately 7 years ago.  In 2018 he was seen at [place]'s cardiology for SVT.  He apparently had a stress echocardiogram in December of that year but he does not recall or have the results of that test.  Nonetheless he initially was  "placed on metoprolol and he stated this was subsequently t transitioned to thiazide.  He continued to have problems with recurrent symptoms related to SVT and more recently was seen at [facility] with Fermín Cabrera this past March.  He was placed on flecainide.  In 2023 he had a coronary calcium scan at [facility] with a score of over 500.  His atorvastatin dose was recently been increased.  He had been scheduled for an upcoming coronary CTA at [facility].    The patient does not smoke cigarettes.  Though never diagnosed with diabetes mellitus he had previously been on metformin for \"glucose problems\" but more recently has started alternating fasting and added additional supplements having stopped his metformin.  He is never smoked cigarettes.  Rarely drinks alcohol.    The patient denies any thyroid disease, peptic ulcer disease gallbladder or liver disease, GI bleeding, kidney disease, contrast dye allergy, TIA symptoms or stroke.  Followed by Dr. Shaw at [facility]    Family history significant with brother that had heart disease for several years  of a \"massive coronary\" at age 64.    Review of Systems  Review of Systems   Constitutional:  Positive for diaphoresis.   Respiratory:  Negative for chest tightness and shortness of breath.    Cardiovascular:  Positive for chest pain. Negative for palpitations.   Gastrointestinal:  Negative for abdominal pain and nausea.   Neurological:  Negative for dizziness and headaches.   All other systems reviewed and are negative.      Past Medical History   has a past medical history of Anesthesia (2019), Arthritis (2019), Breath shortness (2019), Celiac artery aneurysm (HCC), Chickenpox, Diabetes (HCC) (2020), Dyslipidemia, High cholesterol, Hypertension, Kidney stone, Sleep apnea, and Snoring.    Surgical History   has a past surgical history that includes lithotripsy; knee arthroscopy; " other orthopedic surgery; bone spur excision; other orthopedic surgery; other; aortofemoral bypass (N/A, 8/15/2019); and incision hernia repair (8/19/2020).    Family History  family history includes Cancer in his brother; Diabetes in his brother and father; Sleep Apnea in his brother and brother.    Social History   reports that he has never smoked. He has never used smokeless tobacco. He reports current alcohol use. He reports that he does not use drugs.    Medications  Prior to Admission Medications   Prescriptions Last Dose Informant Patient Reported? Taking?   Cholecalciferol (D3 PO) 9/11/2023 Patient Yes No   Sig: Take 1 Capsule by mouth every evening.   Ferrous Sulfate (IRON PO) 9/11/2023 Patient Yes No   Sig: Take 1 Tablet by mouth every evening.   Menaquinone-7 (VITAMIN K2 PO) 9/11/2023 Patient Yes No   Sig: Take 1 Capsule by mouth every evening.   NON SPECIFIED 9/11/2023 Patient Yes No   Sig: Take 1 Capsule by mouth 3 times a day. MCT oil (OTC)   Omega-3 Fatty Acids (FISH OIL) 1000 MG Cap capsule 9/11/2023 Patient Yes No   Sig: Take 1,000 mg by mouth 2 times a day.   allopurinol (ZYLOPRIM) 300 MG Tab 9/11/2023 Patient No No   Sig: Take 1 tablet by mouth every day.   Patient taking differently: Take 300 mg by mouth every evening.   amLODIPine (NORVASC) 5 MG Tab 9/11/2023 Patient No No   Sig: TAKE 1 TABLET BY MOUTH  DAILY   Patient taking differently: Take 5 mg by mouth every day.   atorvastatin (LIPITOR) 40 MG Tab 9/11/2023 Patient Yes No   Sig: Take 40 mg by mouth every evening.   flecainide (TAMBOCOR) 100 MG Tab 9/11/2023 Patient Yes No   Sig: Take 100 mg by mouth 2 times a day.   fluticasone (FLONASE) 50 MCG/ACT nasal spray 9/11/2023 Patient No No   Sig: USE 1 SPRAY IN EACH NOSTRIL EVERY DAY   Patient taking differently: Administer 1 Spray into affected nostril(S) every day.   ibuprofen (MOTRIN) 200 MG Tab 9/10/2023 Patient Yes No   Sig: Take 400 mg by mouth every 6 hours as needed. Indications: Pain    loratadine (CLARITIN) 10 MG Tab 9/11/2023 Patient Yes No   Sig: Take 10 mg by mouth as needed. Indications: Hayfever   tamsulosin (FLOMAX) 0.4 MG capsule 9/11/2023 Patient Yes No   Sig: Take 0.4 mg by mouth every evening.   therapeutic multivitamin-minerals (THERAGRAN-M) Tab 9/11/2023 Patient Yes No   Sig: Take 1 Tab by mouth every day.   triamterene-hctz (MAXZIDE-25/DYAZIDE) 37.5-25 MG Tab 9/11/2023 Patient Yes No   Sig: Take 1 Tablet by mouth every evening.      Facility-Administered Medications: None       Allergies  Allergies   Allergen Reactions    Lisinopril Hives       Vital signs in last 24 hours  Temp:  [36.9 °C (98.4 °F)-37.1 °C (98.8 °F)] 37.1 °C (98.8 °F)  Pulse:  [70-87] 87  Resp:  [18-20] 20  BP: (130-132)/(76-92) 130/76  SpO2:  [92 %-95 %] 95 %    Physical Exam  Physical Exam  Constitutional:       General: He is not in acute distress.  HENT:      Head: Normocephalic.   Eyes:      General: No scleral icterus.     Conjunctiva/sclera: Conjunctivae normal.      Pupils: Pupils are equal, round, and reactive to light.   Neck:      Comments: Normal jugular venous pressure.  Cardiovascular:      Rate and Rhythm: Normal rate and regular rhythm.      Pulses:           Carotid pulses are 1+ on the right side and 1+ on the left side.       Radial pulses are 1+ on the right side and 1+ on the left side.        Posterior tibial pulses are 1+ on the right side and 1+ on the left side.      Heart sounds: S1 normal and S2 normal. No murmur heard.     No friction rub. No gallop.   Pulmonary:      Effort: Pulmonary effort is normal.      Breath sounds: Normal breath sounds. No wheezing, rhonchi or rales.   Chest:      Comments: Increased AP diameter  Abdominal:      General: Bowel sounds are normal.      Palpations: Abdomen is soft.      Tenderness: There is no abdominal tenderness.      Comments: Protuberant   Musculoskeletal:      Right lower leg: No edema.      Left lower leg: No edema.   Skin:     General: Skin  "is warm and dry.      Nails: There is no clubbing.   Neurological:      Mental Status: He is alert and oriented to person, place, and time.   Psychiatric:         Behavior: Behavior normal.         Lab Review  Lab Results   Component Value Date/Time    WBC 10.2 09/13/2023 01:24 AM    RBC 4.76 09/13/2023 01:24 AM    HEMOGLOBIN 13.8 (L) 09/13/2023 01:24 AM    HEMATOCRIT 41.9 (L) 09/13/2023 01:24 AM    MCV 88.0 09/13/2023 01:24 AM    MCH 29.0 09/13/2023 01:24 AM    MCHC 32.9 09/13/2023 01:24 AM    MPV 10.4 09/13/2023 01:24 AM      Lab Results   Component Value Date/Time    SODIUM 139 09/13/2023 01:24 AM    POTASSIUM 4.0 09/13/2023 01:24 AM    CHLORIDE 105 09/13/2023 01:24 AM    CO2 25 09/13/2023 01:24 AM    GLUCOSE 110 (H) 09/13/2023 01:24 AM    BUN 16 09/13/2023 01:24 AM    CREATININE 0.79 09/13/2023 01:24 AM      Lab Results   Component Value Date/Time    ASTSGOT 28 09/12/2023 09:32 PM    ALTSGPT 25 09/12/2023 09:32 PM     Lab Results   Component Value Date/Time    CHOLSTRLTOT 135 09/13/2023 01:24 AM    LDL 72 09/13/2023 01:24 AM    HDL 52 09/13/2023 01:24 AM    TRIGLYCERIDE 53 09/13/2023 01:24 AM    TROPONINT 388 (H) 09/13/2023 01:39 PM       No results for input(s): \"NTPROBNP\" in the last 72 hours.    Cardiac Imaging and Procedures Review  EKG:  My personal interpretation of the EKG dated 9/30/2023 is sinus rhythm early precordial transition    Echocardiogram: 9/13/2023  Normal left ventricular systolic function.  The left ventricular ejection fraction is visually estimated to be 65%.  Normal left rectal wall motion  Aortic valve sclerosis without stenosis.  Normal right ventricular size and systolic function.  No prior study is available for comparison.     Imaging  Chest X-Ray: 9/12/2023  1.  No acute cardiopulmonary disease.  2.  Cardiomegaly    CORONARY CALCIUM SCAN 5/19/2022  Calcium score 534    Assessment  NSTEMI  Coronary atherosclerosis with coronary calcification  Supraventricular " tachycardia  Antiarrhythmic therapy with flecainide  Hypertension  Dyslipidemia  Family history of premature coronary disease  Glucose intolerance  DK on CPAP  Nephrolithiasis  Prostatism  Obesity     Recommendation Discussion  Cardiac catheterization for post MI risk stratification. I have discussed the risks and benefits of cardiac catheterization procedure with the patient including, the procedure itself, the complications of the procedure including but not limited to death, stroke, MI, urgent bypass surgery, contrast nephropathy, vascular complications, bleeding and infection and the potential treatment possibilities based on the findings of the procedure including possible PCI, possible CABG or possible medical treatment only were also reviewed after which the patient expressed their understanding and  wished to proceed.    N.p.o. after midnight  IV fluid for hydration.  Cardiac catheterization  Optimize secondary ASCVD prevention therapy  Continue aspirin, IV heparin  Add metoprolol  Hold flecainide  Discussed treatment plan with patient, bedside RN and Dr. Bobby Walton    Thank you for allowing me to participate in the care of this patient.    Please contact me with any questions.    Ja Delatorre M.D.   Cardiologist, [facility]  [phone]

## 2023-09-15 ENCOUNTER — PHARMACY VISIT (OUTPATIENT)
Dept: PHARMACY | Facility: MEDICAL CENTER | Age: 63
End: 2023-09-15
Payer: MEDICARE

## 2023-09-15 ENCOUNTER — PATIENT OUTREACH (OUTPATIENT)
Dept: SCHEDULING | Facility: IMAGING CENTER | Age: 63
End: 2023-09-15
Payer: COMMERCIAL

## 2023-09-15 VITALS
WEIGHT: 261.91 LBS | HEART RATE: 78 BPM | DIASTOLIC BLOOD PRESSURE: 88 MMHG | OXYGEN SATURATION: 93 % | RESPIRATION RATE: 18 BRPM | TEMPERATURE: 98.1 F | SYSTOLIC BLOOD PRESSURE: 134 MMHG | HEIGHT: 70 IN | BODY MASS INDEX: 37.5 KG/M2

## 2023-09-15 LAB
ANION GAP SERPL CALC-SCNC: 8 MMOL/L (ref 7–16)
BUN SERPL-MCNC: 16 MG/DL (ref 8–22)
CALCIUM SERPL-MCNC: 9.4 MG/DL (ref 8.5–10.5)
CHLORIDE SERPL-SCNC: 108 MMOL/L (ref 96–112)
CO2 SERPL-SCNC: 24 MMOL/L (ref 20–33)
CREAT SERPL-MCNC: 0.77 MG/DL (ref 0.5–1.4)
ERYTHROCYTE [DISTWIDTH] IN BLOOD BY AUTOMATED COUNT: 49.1 FL (ref 35.9–50)
GFR SERPLBLD CREATININE-BSD FMLA CKD-EPI: 100 ML/MIN/1.73 M 2
GLUCOSE SERPL-MCNC: 104 MG/DL (ref 65–99)
HCT VFR BLD AUTO: 40.9 % (ref 42–52)
HGB BLD-MCNC: 13.3 G/DL (ref 14–18)
MCH RBC QN AUTO: 28.6 PG (ref 27–33)
MCHC RBC AUTO-ENTMCNC: 32.5 G/DL (ref 32.3–36.5)
MCV RBC AUTO: 88 FL (ref 81.4–97.8)
PLATELET # BLD AUTO: 226 K/UL (ref 164–446)
PMV BLD AUTO: 9.7 FL (ref 9–12.9)
POTASSIUM SERPL-SCNC: 3.9 MMOL/L (ref 3.6–5.5)
RBC # BLD AUTO: 4.65 M/UL (ref 4.7–6.1)
SODIUM SERPL-SCNC: 140 MMOL/L (ref 135–145)
WBC # BLD AUTO: 8.5 K/UL (ref 4.8–10.8)

## 2023-09-15 PROCEDURE — A9270 NON-COVERED ITEM OR SERVICE: HCPCS | Performed by: INTERNAL MEDICINE

## 2023-09-15 PROCEDURE — 85027 COMPLETE CBC AUTOMATED: CPT

## 2023-09-15 PROCEDURE — 700102 HCHG RX REV CODE 250 W/ 637 OVERRIDE(OP): Performed by: INTERNAL MEDICINE

## 2023-09-15 PROCEDURE — 99232 SBSQ HOSP IP/OBS MODERATE 35: CPT | Performed by: INTERNAL MEDICINE

## 2023-09-15 PROCEDURE — 99239 HOSP IP/OBS DSCHRG MGMT >30: CPT | Performed by: INTERNAL MEDICINE

## 2023-09-15 PROCEDURE — RXMED WILLOW AMBULATORY MEDICATION CHARGE: Performed by: INTERNAL MEDICINE

## 2023-09-15 PROCEDURE — 80048 BASIC METABOLIC PNL TOTAL CA: CPT

## 2023-09-15 RX ORDER — LOSARTAN POTASSIUM 25 MG/1
25 TABLET ORAL
Status: DISCONTINUED | OUTPATIENT
Start: 2023-09-15 | End: 2023-09-15 | Stop reason: HOSPADM

## 2023-09-15 RX ORDER — CLOPIDOGREL BISULFATE 75 MG/1
75 TABLET ORAL DAILY
Qty: 30 TABLET | Refills: 0 | Status: SHIPPED | OUTPATIENT
Start: 2023-09-16

## 2023-09-15 RX ORDER — METOPROLOL TARTRATE 50 MG/1
50 TABLET, FILM COATED ORAL TWICE DAILY
Status: DISCONTINUED | OUTPATIENT
Start: 2023-09-15 | End: 2023-09-15 | Stop reason: HOSPADM

## 2023-09-15 RX ORDER — LOSARTAN POTASSIUM 25 MG/1
25 TABLET ORAL DAILY
Qty: 30 TABLET | Refills: 0 | Status: SHIPPED | OUTPATIENT
Start: 2023-09-15

## 2023-09-15 RX ORDER — ASPIRIN 81 MG/1
81 TABLET ORAL DAILY
Qty: 100 TABLET | Refills: 0 | Status: SHIPPED | OUTPATIENT
Start: 2023-09-16

## 2023-09-15 RX ORDER — ATORVASTATIN CALCIUM 80 MG/1
80 TABLET, FILM COATED ORAL EVERY EVENING
Qty: 30 TABLET | Refills: 0 | Status: SHIPPED | OUTPATIENT
Start: 2023-09-15

## 2023-09-15 RX ORDER — METOPROLOL TARTRATE 50 MG/1
50 TABLET, FILM COATED ORAL 2 TIMES DAILY
Qty: 60 TABLET | Refills: 0 | Status: SHIPPED | OUTPATIENT
Start: 2023-09-15

## 2023-09-15 RX ADMIN — AMLODIPINE BESYLATE 5 MG: 5 TABLET ORAL at 05:59

## 2023-09-15 RX ADMIN — CLOPIDOGREL BISULFATE 75 MG: 75 TABLET ORAL at 05:58

## 2023-09-15 RX ADMIN — ASPIRIN 81 MG: 81 TABLET, COATED ORAL at 05:59

## 2023-09-15 ASSESSMENT — FIBROSIS 4 INDEX: FIB4 SCORE: 1.59

## 2023-09-15 NOTE — PROGRESS NOTES
Received bedside report from day shift RN, and assumed care.  Patient sitting in bed finishing up his dinner.  Patient is alert with no signs of any acute distress.  He denies any pain, concerns or questions at this time.  Continue plan of care.

## 2023-09-15 NOTE — PROGRESS NOTES
Pt educated on DC instructions, when to seek medical attention, appointment follow-ups, medication changes and updates.    Pt in agreement with DC plan and verbalizes ready to go home.    IV removed x2. Tele monitor removed and monitor room notified of DC. Pt education given to pt and signed belongings list.

## 2023-09-15 NOTE — DISCHARGE SUMMARY
Discharge Summary    CHIEF COMPLAINT ON ADMISSION  No chief complaint on file.      Reason for Admission  ACS, elevated troponin     Admission Date  9/13/2023    CODE STATUS  Prior    HPI & HOSPITAL COURSE  This is a 63 y.o. male This is a 63 y.o. male who presented 9/12/2023 with chest pain.  Patient was initially  admitted at [facility] and evaluated for chest pain. Chest pain resolved overnight. However, he did have an initial elevation of troponins (41, 133, 188, 352).  Echocardiogram with EF of 65%, normal systolic function. Patient was started on heparin for ACS and case was discussed with cardiologist on call, Dr Weinstein, who recommended transfer to [facility] for further evaluation and possible cardiac catheterization.   Patient had angioplasty and placement of a 2.5 by 16mm Synergy drug-eluting stent in proximal  right coronary artery.and angioplasty and placement of a 2.25 by 8 mm Synergy drug-eluting  stent in distal  right coronary artery. Tolerated procedure well and has been started on DAPT.  Patient now doing well and per cardiology can be discharged and follow up with them as outpatient  Will arrange meds to bed and will discharge patient home today     The patient met 2-midnight criteria for an inpatient stay at the time of discharge.    Discharge Date  9/15/2023    FOLLOW UP ITEMS POST DISCHARGE  Cardiology     DISCHARGE DIAGNOSES  Principal Problem:    Chest pain (POA: Yes)  Active Problems:    Essential hypertension (POA: Yes)    DK (obstructive sleep apnea) (POA: Yes)    Pure hypercholesterolemia (POA: Yes)    BMI 36.0-36.9,adult (POA: Yes)    NSTEMI (non-ST elevated myocardial infarction) (HCC) (POA: Yes)  Resolved Problems:    * No resolved hospital problems. *      FOLLOW UP  No future appointments.  [facility]  [address] [place] [address]  # [phone]  Go on 9/21/2023  Please go to your follow up appointment with Satnam Cabrera M.D. on Thursday  September 21, 2023 at 10:30am.      MEDICATIONS ON DISCHARGE     Medication List        START taking these medications        Instructions   Aspirin Low Dose 81 MG EC tablet  Start taking on: September 16, 2023  Generic drug: aspirin   Take 1 Tablet by mouth every day.  Dose: 81 mg     clopidogrel 75 MG Tabs  Start taking on: September 16, 2023  Commonly known as: Plavix   Take 1 Tablet by mouth every day.  Dose: 75 mg     losartan 25 MG Tabs  Commonly known as: Cozaar   Take 1 Tablet by mouth every day.  Dose: 25 mg     metoprolol tartrate 50 MG Tabs  Commonly known as: Lopressor   Take 1 Tablet by mouth 2 times a day.  Dose: 50 mg            CHANGE how you take these medications        Instructions   allopurinol 300 MG Tabs  What changed: when to take this  Commonly known as: Zyloprim   Take 1 tablet by mouth every day.  Dose: 300 mg     atorvastatin 80 MG tablet  What changed:   medication strength  how much to take  when to take this  Commonly known as: Lipitor   Take 1 Tablet by mouth every evening.  Dose: 80 mg     fluticasone 50 MCG/ACT nasal spray  What changed:   how to take this  when to take this  Commonly known as: Flonase   USE 1 SPRAY IN EACH NOSTRIL EVERY DAY            CONTINUE taking these medications        Instructions   amLODIPine 5 MG Tabs  Commonly known as: Norvasc   Doctor's comments: Requesting 1 year supply  TAKE 1 TABLET BY MOUTH  DAILY     D3 PO   Take 1 Capsule by mouth every evening.  Dose: 1 Capsule     fish oil 1000 MG Caps capsule   Take 1,000 mg by mouth 2 times a day.  Dose: 1,000 mg     loratadine 10 MG Tabs  Commonly known as: Claritin   Take 10 mg by mouth as needed. Indications: Hayfever  Dose: 10 mg     NON SPECIFIED   Take 1 Capsule by mouth 3 times a day. MCT oil (OTC)  Dose: 1 Capsule     tamsulosin 0.4 MG capsule  Commonly known as: Flomax   Take 0.4 mg by mouth every evening.  Dose: 0.4 mg     therapeutic multivitamin-minerals Tabs   Take 1 Tab by mouth every day.  Dose:  1 Tablet     VITAMIN K2 PO   Take 1 Capsule by mouth every evening.  Dose: 1 Capsule            STOP taking these medications      flecainide 100 MG Tabs  Commonly known as: Tambocor     ibuprofen 200 MG Tabs  Commonly known as: Motrin     IRON PO     triamterene-hctz 37.5-25 MG Tabs  Commonly known as: Maxzide-25/Dyazide              Allergies  Allergies   Allergen Reactions    Lisinopril Hives       DIET  No orders of the defined types were placed in this encounter.      ACTIVITY  As tolerated.  Weight bearing as tolerated    CONSULTATIONS  Cardiology     PROCEDURES  Angioplasty and placement of a 2.5 by 16mm Synergy drug-eluting stent in proximal  right coronary artery.  Angioplasty and placement of a 2.25 by 8 mm Synergy drug-eluting  stent in distal  right coronary artery.    LABORATORY  Lab Results   Component Value Date    SODIUM 140 09/15/2023    POTASSIUM 3.9 09/15/2023    CHLORIDE 108 09/15/2023    CO2 24 09/15/2023    GLUCOSE 104 (H) 09/15/2023    BUN 16 09/15/2023    CREATININE 0.77 09/15/2023        Lab Results   Component Value Date    WBC 8.5 09/15/2023    HEMOGLOBIN 13.3 (L) 09/15/2023    HEMATOCRIT 40.9 (L) 09/15/2023    PLATELETCT 226 09/15/2023        Total time of the discharge process exceeds 35 minutes.

## 2023-09-15 NOTE — DISCHARGE INSTRUCTIONS
Diagnosis:  Acute Coronary Syndrome (ACS) is a diagnosis that encompasses cardiac-related chest pain and heart attack. ACS occurs when the blood flow to the heart muscle is severely reduced or cut off completely due to a slow process called atherosclerosis.  Atherosclerosis is a disease in which the coronary arteries become narrow from a buildup of fat, cholesterol, and other substances that combine to form plaque. If the plaque breaks, a blood clot will form and block the blood flow to the heart muscle. This lack of blood flow can cause damage or death to the heart muscle which is called a heart attack or Myocardial Infarction (MI). There are two different types of MIs:  ST Elevation Myocardial Infarction or STEMI (the most severe type of heart attack) and Non-ST Elevation Myocardial Infarction or NSTEMI.    Treatment Plan:  Cardiac Diet  - Low fat, low salt, low cholesterol   Cardiac Rehab  - Your doctor has ordered you a referral to [facility].  Call [phone] to schedule an appointment.  Attend my follow-up appointment with my Cardiologist.  Take my medications as prescribed by my doctor  Exercise daily  Lower my bad cholesterol and raise my good cholesterol, lower my blood pressure, and Reduce stress    Medications:  Certain medications are used to treat ACS.  Remember to always take medications as prescribed and never stop talking medications unless told by your doctor.    You have been prescribed the following medicatons:    Aspirin - Aspirin is used as a blood thinning medication and you will require this medication indefinitely.  Anti-platelet/blood thinner - Your Anti-platelet/Blood thinning medication is called clopidogrel (Plavix), and is used in combination with aspirin to prevent clots from forming in your heart and/or around your stent.  Your doctor will determine how long you need to be on this medicine.  Beta-Blocker - Beta-Blocker metoprolol (Lopressor), is used to lower blood pressure and heart  rate, and/or helps your heart heal after a heart attack.  Statin - Statin atorvastatin (Lipitor), is used to lower cholesterol.  Angiotensin Receptor Blocker (ARB) - Angiotensin Receptor Blocker losartan (Cozaar), is used to lower blood pressure and treat heart failure.

## 2023-09-15 NOTE — PROGRESS NOTES
"Cardiology Progress Note:    Millicent Kirby M.D.  Date & Time note created:    9/15/2023   8:51 AM     Referring MD:  Dr. Nettles    Patient ID:   Name:             Jhonatan Fonseca   YOB: 1960  Age:                 63 y.o.  male   MRN:               [id number]                                                             Chief Complaint / Reason for consult:  ACS    History of Present Illness:    This is a 63 years old man with hypertension, hyperlipidemia, family history of premature CAD, sleep apnea on CPAP, hyperglycemia, obesity, presented to the hospital with chest pain and diagnosis of acute coronary syndrome.     I have personally interpreted EKG today with patient, there is no evidence of acute coronary syndrome, no evidence of prior infarct, normal KS and QT interval, no significant conduction disease. Sinus rhythm.    S/p distal RCA stent 09/15/2023.    Overnight events:  No acute events overnight.  No telemetry events.      Review of Systems:      Constitutional: Denies fevers, Denies weight changes  Eyes: Denies changes in vision, no eye pain  Ears/Nose/Throat/Mouth: Denies nasal congestion or sore throat   Cardiovascular: no chest pain, no palpitations   Respiratory: no shortness of breath , Denies cough  Gastrointestinal/Hepatic: Denies abdominal pain, nausea, vomiting, diarrhea, constipation or GI bleeding   Genitourinary: Denies dysuria or frequency  Musculoskeletal/Rheum: Denies  joint pain and swelling   Skin: Denies rash  Neurological: Denies headache, confusion, memory loss or focal weakness/parasthesias  Psychiatric: denies mood disorder   Endocrine: Juliana thyroid problems  Heme/Oncology/Lymph Nodes: Denies enlarged lymph nodes, denies brusing or known bleeding disorder  All other systems were reviewed and are negative (AMA/CMS criteria)                Past Medical History:   Past Medical History:   Diagnosis Date    Anesthesia 08/13/2019    \"I was told I turned green on table " "during foot surgery about 20 years ago.\"    Arthritis 08/13/2019    Left Knee, Right Hand     Breath shortness 08/13/2019    Pt. states overweight & out of shape.    Celiac artery aneurysm (HCC)     Chickenpox     Diabetes (HCC) 08/14/2020    Takes Metformin to control A1C but not actually diagnosed    Dyslipidemia     High cholesterol     Hypertension     Kidney stone     Sleep apnea     CPAP USE    Snoring     DX DK     Active Hospital Problems    Diagnosis     Essential hypertension [I10]      Priority: High    NSTEMI (non-ST elevated myocardial infarction) ([facility]) [I21.4]     Chest pain [R07.9]     BMI 36.0-36.9,adult [Z68.36]     DK (obstructive sleep apnea) [G47.33]     Pure hypercholesterolemia [E78.00]        Past Surgical History:  Past Surgical History:   Procedure Laterality Date    INCISION HERNIA REPAIR  8/19/2020    Procedure: REPAIR, HERNIA, INCISIONAL - W/MESH;  Surgeon: Elie Morales M.D.;  Location: SURGERY [facility];  Service: General    AORTOFEMORAL BYPASS N/A 8/15/2019    Procedure: CREATION, bypass arterial, celiac artery aneurism repair;  Surgeon: Elie Morales M.D.;  Location: SURGERY [facility];  Service: General    BONE SPUR EXCISION      KNEE ARTHROSCOPY      repair micscus tear    LITHOTRIPSY      OTHER      Surgery for right shoulder dislocation and torn ligaments 1974.    OTHER ORTHOPEDIC SURGERY      Left Foot Surgery x3 from broken foot in high school.    OTHER ORTHOPEDIC SURGERY      Right Achilles Surgery       Hospital Medications:    Current Facility-Administered Medications:     atorvastatin (Lipitor) tablet 80 mg, 80 mg, Oral, Q EVENING, Millicent Kirby M.D., 80 mg at 09/14/23 1723    aspirin EC tablet 81 mg, 81 mg, Oral, DAILY, Girish Orona M.D., 81 mg at 09/15/23 0559    clopidogrel (Plavix) tablet 600 mg, 600 mg, Oral, Once, Girish Orona M.D.    clopidogrel (Plavix) tablet 75 mg, 75 mg, Oral, DAILY, Girish Orona M.D., 75 mg at 09/15/23 " 0558    allopurinol (Zyloprim) tablet 300 mg, 300 mg, Oral, Q EVENING, Candido Hale M.D., 300 mg at 09/14/23 1724    amLODIPine (Norvasc) tablet 5 mg, 5 mg, Oral, DAILY, Candido Hale M.D., 5 mg at 09/15/23 0559    senna-docusate (Pericolace Or Senokot S) 8.6-50 MG per tablet 2 Tablet, 2 Tablet, Oral, BID **AND** polyethylene glycol/lytes (Miralax) PACKET 1 Packet, 1 Packet, Oral, QDAY PRN **AND** magnesium hydroxide (Milk Of Magnesia) suspension 30 mL, 30 mL, Oral, QDAY PRN **AND** bisacodyl (Dulcolax) suppository 10 mg, 10 mg, Rectal, QDAY PRN, Candido Hale M.D.    tamsulosin (Flomax) capsule 0.4 mg, 0.4 mg, Oral, Q EVENING, Candido Hale M.D., 0.4 mg at 09/14/23 1724    acetaminophen (Tylenol) tablet 650 mg, 650 mg, Oral, Q6HRS PRN, Candido Hale M.D.    hydrALAZINE (Apresoline) injection 10 mg, 10 mg, Intravenous, Q4HRS PRN, Candido Hale M.D.    ondansetron (Zofran ODT) dispertab 4 mg, 4 mg, Oral, Q4HRS PRN, Candido Hale M.D.    nitroglycerin (Nitrostat) tablet 0.4 mg, 0.4 mg, Sublingual, Q5 MIN PRN, Candido Hale M.D.    promethazine (Phenergan) tablet 12.5-25 mg, 12.5-25 mg, Oral, Q4HRS PRN, Candido Hale M.D.    promethazine (Phenergan) suppository 12.5-25 mg, 12.5-25 mg, Rectal, Q4HRS PRN, Candido Hale M.D.    prochlorperazine (Compazine) injection 5-10 mg, 5-10 mg, Intravenous, Q4HRS PRN, Candido Hale M.D.    ondansetron (Zofran) syringe/vial injection 4 mg, 4 mg, Intravenous, Q4HRS PRN, Candido Hale M.D.    NS infusion, , Intravenous, Continuous, Ja Delatorre M.D., Last Rate: 75 mL/hr at 09/14/23 1022, New Bag at 09/14/23 1022    metoprolol SR (Toprol XL) tablet 25 mg, 25 mg, Oral, Q DAY, Ja Delatorre M.D., 25 mg at 09/13/23 2204    Current Outpatient Medications:  Medications Prior to Admission   Medication Sig Dispense Refill Last Dose    flecainide (TAMBOCOR) 100 MG Tab Take 100 mg by mouth 2 times a day.   9/11/2023    atorvastatin (LIPITOR) 40  MG Tab Take 40 mg by mouth every evening.   9/11/2023    triamterene-hctz (MAXZIDE-25/DYAZIDE) 37.5-25 MG Tab Take 1 Tablet by mouth every evening.   9/11/2023    NON SPECIFIED Take 1 Capsule by mouth 3 times a day. MCT oil (OTC)   9/11/2023    Cholecalciferol (D3 PO) Take 1 Capsule by mouth every evening.   9/11/2023    Ferrous Sulfate (IRON PO) Take 1 Tablet by mouth every evening.   9/11/2023    Menaquinone-7 (VITAMIN K2 PO) Take 1 Capsule by mouth every evening.   9/11/2023    ibuprofen (MOTRIN) 200 MG Tab Take 400 mg by mouth every 6 hours as needed. Indications: Pain   9/10/2023    fluticasone (FLONASE) 50 MCG/ACT nasal spray USE 1 SPRAY IN EACH NOSTRIL EVERY DAY (Patient taking differently: Administer 1 Spray into affected nostril(S) every day.) 16 g 1 9/11/2023    amLODIPine (NORVASC) 5 MG Tab TAKE 1 TABLET BY MOUTH  DAILY (Patient taking differently: Take 5 mg by mouth every day.) 90 tablet 3 9/11/2023    allopurinol (ZYLOPRIM) 300 MG Tab Take 1 tablet by mouth every day. (Patient taking differently: Take 300 mg by mouth every evening.) 90 tablet 1 9/11/2023    tamsulosin (FLOMAX) 0.4 MG capsule Take 0.4 mg by mouth every evening.   9/11/2023    loratadine (CLARITIN) 10 MG Tab Take 10 mg by mouth as needed. Indications: Hayfever   9/11/2023    Omega-3 Fatty Acids (FISH OIL) 1000 MG Cap capsule Take 1,000 mg by mouth 2 times a day.   9/11/2023    therapeutic multivitamin-minerals (THERAGRAN-M) Tab Take 1 Tab by mouth every day.   9/11/2023       Medication Allergy:  Allergies   Allergen Reactions    Lisinopril Hives       Family History:  Family History   Problem Relation Age of Onset    Cancer Brother         parathyroid    Sleep Apnea Brother     Diabetes Brother     Sleep Apnea Brother     Diabetes Father        Social History:  Social History     Socioeconomic History    Marital status:      Spouse name: Not on file    Number of children: Not on file    Years of education: Not on file    Highest  "education level: Not on file   Occupational History    Not on file   Tobacco Use    Smoking status: Never    Smokeless tobacco: Never   Vaping Use    Vaping Use: Never used   Substance and Sexual Activity    Alcohol use: Yes     Comment: rarely    Drug use: No    Sexual activity: Yes     Partners: Female   Other Topics Concern     Service No    Blood Transfusions No    Caffeine Concern No    Occupational Exposure No    Hobby Hazards Yes    Sleep Concern No    Stress Concern No    Weight Concern Yes    Special Diet No    Back Care No    Exercise Yes    Bike Helmet No    Seat Belt Yes    Self-Exams No   Social History Narrative    Not on file     Social Determinants of Health     Financial Resource Strain: Not on file   Food Insecurity: Not on file   Transportation Needs: Not on file   Physical Activity: Not on file   Stress: Not on file   Social Connections: Not on file   Intimate Partner Violence: Not on file   Housing Stability: Not on file         Physical Exam:  Vitals/ General Appearance:   Weight/BMI: Body mass index is 37.58 kg/m².  /88   Pulse 78   Temp 36.7 °C (98.1 °F) (Temporal)   Resp 18   Ht 1.778 m (5' 10\")   Wt 119 kg (261 lb 14.5 oz)   SpO2 93%   Vitals:    09/15/23 0303 09/15/23 0559 09/15/23 0615 09/15/23 0718   BP: 131/88 116/81  134/88   Pulse: 67   78   Resp: 18   18   Temp: 36.8 °C (98.2 °F)   36.7 °C (98.1 °F)   TempSrc: Temporal   Temporal   SpO2: 94%   93%   Weight:   119 kg (261 lb 14.5 oz)    Height:         Oxygen Therapy:  Pulse Oximetry: 93 %, O2 (LPM): 0, O2 Delivery Device: None - Room Air    Constitutional:   Well developed, Well nourished, No acute distress  HENMT:  Normocephalic, Atraumatic, Oropharynx moist mucous membranes, No oral exudates, Nose normal.  No thyromegaly.  Eyes:  EOMI, Conjunctiva normal, No discharge.  Neck:  Normal range of motion, No cervical tenderness,  no JVD.  Cardiovascular:  Normal heart rate, Normal rhythm, No murmurs, No rubs, No " gallops.   Extremitites with intact distal pulses, no cyanosis, or edema.  Lungs:  Normal breath sounds, breath sounds clear to auscultation bilaterally,  no rales, no rhonchi, no wheezing.   Abdomen: Bowel sounds normal, Soft, No tenderness, No guarding, No rebound, No masses, No hepatosplenomegaly.  Skin: Warm, Dry, No erythema, No rash, no induration.  Neurologic: Alert & oriented x 3, No focal deficits noted, cranial nerves II through X are intact.  Psychiatric: Affect normal, Judgment normal, Mood normal.      MDM (Data Review):     Records reviewed and summarized in current documentation    Lab Data Review:  Recent Results (from the past 24 hour(s))   POCT activated clotting time device results    Collection Time: 23  1:55 PM   Result Value Ref Range    Istat Activated Clotting Time 245 (H) 74 - 137 sec   POCT activated clotting time device results    Collection Time: 23  2:06 PM   Result Value Ref Range    Istat Activated Clotting Time 378 (H) 74 - 137 sec   EKG STAT    Collection Time: 23  3:08 PM   Result Value Ref Range    Report       [facility]    Test Date:  2023  Pt Name:    JEOVANY RDZ                  Department: 171  MRN:        [id number]                      Room:       T724  Gender:     Male                         Technician: JACKI  :        1960                   Requested By:DANITA HACKETT  Order #:    [id number]                    Reading MD: Jyotsna Valderrama MD    Measurements  Intervals                                Axis  Rate:       68                           P:          48  SC:         179                          QRS:        -7  QRSD:       98                           T:          -1  QT:         424  QTc:        451    Interpretive Statements  SINUS RHYTHM  ABNORMAL R-WAVE PROGRESSION, EARLY TRANSITION  INFERIOR INFARCT, OLD  Electronically Signed On 2023 15:26:52 PDT by Jyotsna Valderrama MD     CBC WITHOUT DIFFERENTIAL    Collection Time: 09/15/23   1:19 AM   Result Value Ref Range    WBC 8.5 4.8 - 10.8 K/uL    RBC 4.65 (L) 4.70 - 6.10 M/uL    Hemoglobin 13.3 (L) 14.0 - 18.0 g/dL    Hematocrit 40.9 (L) 42.0 - 52.0 %    MCV 88.0 81.4 - 97.8 fL    MCH 28.6 27.0 - 33.0 pg    MCHC 32.5 32.3 - 36.5 g/dL    RDW 49.1 35.9 - 50.0 fL    Platelet Count 226 164 - 446 K/uL    MPV 9.7 9.0 - 12.9 fL   Basic Metabolic Panel    Collection Time: 09/15/23  1:19 AM   Result Value Ref Range    Sodium 140 135 - 145 mmol/L    Potassium 3.9 3.6 - 5.5 mmol/L    Chloride 108 96 - 112 mmol/L    Co2 24 20 - 33 mmol/L    Glucose 104 (H) 65 - 99 mg/dL    Bun 16 8 - 22 mg/dL    Creatinine 0.77 0.50 - 1.40 mg/dL    Calcium 9.4 8.5 - 10.5 mg/dL    Anion Gap 8.0 7.0 - 16.0   ESTIMATED GFR    Collection Time: 09/15/23  1:19 AM   Result Value Ref Range    GFR (CKD-EPI) 100 >60 mL/min/1.73 m 2       Imaging/Procedures Review:    Chest Xray:  Reviewed    EKG:   As in HPI.     MDM (Assessment and Plan):     Active Hospital Problems    Diagnosis     Essential hypertension [I10]      Priority: High    NSTEMI (non-ST elevated myocardial infarction) (HCC) [I21.4]     Chest pain [R07.9]     BMI 36.0-36.9,adult [Z68.36]     DK (obstructive sleep apnea) [G47.33]     Pure hypercholesterolemia [E78.00]      I have independently interpreted and reviewed echocardiogram's actual images with patient which showed normal left ventricular systolic function. No wall motion abnormality. No evidence of pulmonary hypertension. No significant valvular disease.    Coronary arterial disease s/p distal RCA in 09/2023:  Betablocker as Metoprolol 50 mg po 2x daily.  ASA 81 mg po daily and Clopidogrel 75mg 1x daily  Statin as Atorvastatin 80 mg po daily  ARB as Losartan 25 mg po daily.     Hypertension:  Optimize control with BB, ARB as permitted above in conjunction with CAD treatment.     Hyperlipidemia:  Optimize statin as within guidelines of CAD treatment as above.    Overall, based on prior history of obstructive  coronary arterial disease, patient is at at high risk for recurrent events and will require consistent ongoing guidelines directed medical therapy to reduce his cardiovascular mortality and associated complications.        Millicent Kirby MD.   Cardiology Inpatient Service.  [facility].  [phone].  [facility].

## 2023-09-15 NOTE — CARE PLAN
The patient is Stable - Low risk of patient condition declining or worsening    Shift Goals  Clinical Goals: Monitor VS, NPO for heart cath, monitor heaprin gtt  Patient Goals: Heart cath today    Progress made toward(s) clinical / shift goals:     Problem: Knowledge Deficit - Standard  Goal: Patient and family/care givers will demonstrate understanding of plan of care, disease process/condition, diagnostic tests and medications  Outcome: Progressing     Problem: Optimal Care for the Acute Coronary Syndrome Patient  Goal: Optimal Care for the Acute Coronary Syndrome Patient  Outcome: Progressing     Problem: Optimal Care for the Cath Lab Patient  Goal: Optimal Care for the Cath Lab Patient  Outcome: Progressing     Problem: Discharge Care for the Acute Coronary Syndrome Patient  Goal: Patient will be discharged on guideline directed medical therapy  Outcome: Progressing     Problem: Respiratory  Goal: Patient will achieve/maintain optimum respiratory ventilation and gas exchange  Outcome: Progressing     Problem: Pain - Standard  Goal: Alleviation of pain or a reduction in pain to the patient’s comfort goal  Outcome: Progressing

## 2023-09-15 NOTE — CARE PLAN
The patient is Stable - Low risk of patient condition declining or worsening    Shift Goals  Clinical Goals: Monitor vitals/ Rhythm  Patient Goals: Rest/DC  Family Goals: Not present    Progress made toward(s) clinical / shift goals:        Problem: Knowledge Deficit - Standard  Goal: Patient and family/care givers will demonstrate understanding of plan of care, disease process/condition, diagnostic tests and medications  Outcome: Progressing     Problem: Optimal Care for the Acute Coronary Syndrome Patient  Goal: Potential Interventions for the Acute Coronary Syndrome Patient  Outcome: Progressing     Problem: Optimal Care for the Cath Lab Patient  Goal: Optimal Care for the Cath Lab Patient  Outcome: Progressing       Patient is not progressing towards the following goals:

## 2024-02-05 ENCOUNTER — TELEPHONE (OUTPATIENT)
Dept: HEALTH INFORMATION MANAGEMENT | Facility: OTHER | Age: 64
End: 2024-02-05
Payer: COMMERCIAL

## 2024-09-13 NOTE — PROGRESS NOTES
Report called to Sagar BOSWELL on [facility]. Pt and pt's wife updated on transfer. All questions answered and belongings and CPAP transferred with patient.   59

## 2024-10-22 NOTE — PATIENT INSTRUCTIONS
1. Continue CPAP nightly  2. Clean mask & tubing weekly  3. Replace supplies as insurance will allow  4. Follow up annually, sooner if needed  5. Verify overnight oximetry, order through [facility], okay to call for results once completed     Left message for patient to call back.

## 2024-12-02 ENCOUNTER — APPOINTMENT (OUTPATIENT)
Dept: URBAN - METROPOLITAN AREA CLINIC 15 | Facility: CLINIC | Age: 64
Setting detail: DERMATOLOGY
End: 2024-12-02

## 2024-12-02 DIAGNOSIS — D485 NEOPLASM OF UNCERTAIN BEHAVIOR OF SKIN: ICD-10-CM

## 2024-12-02 DIAGNOSIS — D22 MELANOCYTIC NEVI: ICD-10-CM

## 2024-12-02 DIAGNOSIS — Z71.89 OTHER SPECIFIED COUNSELING: ICD-10-CM

## 2024-12-02 DIAGNOSIS — D18.0 HEMANGIOMA: ICD-10-CM

## 2024-12-02 DIAGNOSIS — L81.4 OTHER MELANIN HYPERPIGMENTATION: ICD-10-CM

## 2024-12-02 DIAGNOSIS — L57.8 OTHER SKIN CHANGES DUE TO CHRONIC EXPOSURE TO NONIONIZING RADIATION: ICD-10-CM

## 2024-12-02 DIAGNOSIS — L82.1 OTHER SEBORRHEIC KERATOSIS: ICD-10-CM

## 2024-12-02 DIAGNOSIS — R21 RASH AND OTHER NONSPECIFIC SKIN ERUPTION: ICD-10-CM

## 2024-12-02 PROBLEM — D22.5 MELANOCYTIC NEVI OF TRUNK: Status: ACTIVE | Noted: 2024-12-02

## 2024-12-02 PROBLEM — D48.5 NEOPLASM OF UNCERTAIN BEHAVIOR OF SKIN: Status: ACTIVE | Noted: 2024-12-02

## 2024-12-02 PROBLEM — D18.01 HEMANGIOMA OF SKIN AND SUBCUTANEOUS TISSUE: Status: ACTIVE | Noted: 2024-12-02

## 2024-12-02 PROCEDURE — ? COUNSELING

## 2024-12-02 PROCEDURE — 11102 TANGNTL BX SKIN SINGLE LES: CPT

## 2024-12-02 PROCEDURE — ? SUNSCREEN RECOMMENDATIONS

## 2024-12-02 PROCEDURE — ? BIOPSY BY SHAVE METHOD

## 2024-12-02 PROCEDURE — 99203 OFFICE O/P NEW LOW 30 MIN: CPT | Mod: 25

## 2024-12-02 PROCEDURE — 11103 TANGNTL BX SKIN EA SEP/ADDL: CPT

## 2024-12-02 ASSESSMENT — LOCATION SIMPLE DESCRIPTION DERM
LOCATION SIMPLE: RIGHT BUTTOCK
LOCATION SIMPLE: RIGHT THIGH
LOCATION SIMPLE: LEFT BUTTOCK
LOCATION SIMPLE: RIGHT HAND
LOCATION SIMPLE: RIGHT LOWER BACK
LOCATION SIMPLE: RIGHT UPPER BACK
LOCATION SIMPLE: RIGHT PRETIBIAL REGION
LOCATION SIMPLE: LEFT HAND

## 2024-12-02 ASSESSMENT — LOCATION DETAILED DESCRIPTION DERM
LOCATION DETAILED: RIGHT SUPERIOR UPPER BACK
LOCATION DETAILED: RIGHT DISTAL PRETIBIAL REGION
LOCATION DETAILED: RIGHT BUTTOCK
LOCATION DETAILED: RIGHT RADIAL DORSAL HAND
LOCATION DETAILED: RIGHT ANTERIOR PROXIMAL THIGH
LOCATION DETAILED: RIGHT SUPERIOR LATERAL LOWER BACK
LOCATION DETAILED: LEFT RADIAL DORSAL HAND
LOCATION DETAILED: LEFT BUTTOCK

## 2024-12-02 ASSESSMENT — LOCATION ZONE DERM
LOCATION ZONE: LEG
LOCATION ZONE: TRUNK
LOCATION ZONE: HAND

## 2024-12-16 ENCOUNTER — RX ONLY (RX ONLY)
Age: 64
End: 2024-12-16

## 2024-12-16 RX ORDER — TRIAMCINOLONE ACETONIDE 1 MG/G
CREAM TOPICAL
Qty: 30 | Refills: 1 | Status: ERX | COMMUNITY
Start: 2024-12-16

## 2025-01-06 ENCOUNTER — HOSPITAL ENCOUNTER (OUTPATIENT)
Facility: MEDICAL CENTER | Age: 65
End: 2025-01-06
Attending: UROLOGY
Payer: COMMERCIAL

## 2025-01-07 LAB
FORWARD REASON: SPWHY: NORMAL
FORWARDED TO LAB: SPWHR: NORMAL
SPECIMEN SENT: SPWT1: NORMAL

## 2025-01-20 ENCOUNTER — RX ONLY (RX ONLY)
Age: 65
End: 2025-01-20

## 2025-01-20 ENCOUNTER — APPOINTMENT (OUTPATIENT)
Dept: URBAN - METROPOLITAN AREA CLINIC 15 | Facility: CLINIC | Age: 65
Setting detail: DERMATOLOGY
End: 2025-01-20

## 2025-01-20 DIAGNOSIS — L92.0 GRANULOMA ANNULARE: ICD-10-CM | Status: INADEQUATELY CONTROLLED

## 2025-01-20 PROCEDURE — ? MEDICATION COUNSELING

## 2025-01-20 PROCEDURE — 99213 OFFICE O/P EST LOW 20 MIN: CPT

## 2025-01-20 PROCEDURE — ? PRESCRIPTION

## 2025-01-20 PROCEDURE — ? DIAGNOSIS COMMENT

## 2025-01-20 PROCEDURE — ? COUNSELING

## 2025-01-20 RX ORDER — CLOBETASOL PROPIONATE 0.5 MG/G
2 OINTMENT TOPICAL
Qty: 45 | Refills: 0 | Status: ERX | COMMUNITY
Start: 2025-01-20

## 2025-01-20 RX ORDER — CLOBETASOL PROPIONATE 0.25 MG/G
CREAM TOPICAL
Qty: 60 | Refills: 3 | Status: ERX | COMMUNITY
Start: 2025-01-20

## 2025-01-20 RX ADMIN — CLOBETASOL PROPIONATE 2: 0.5 OINTMENT TOPICAL at 00:00

## 2025-01-20 ASSESSMENT — LOCATION DETAILED DESCRIPTION DERM: LOCATION DETAILED: RIGHT DORSAL INDEX METACARPOPHALANGEAL JOINT

## 2025-01-20 ASSESSMENT — LOCATION SIMPLE DESCRIPTION DERM: LOCATION SIMPLE: RIGHT HAND

## 2025-01-20 ASSESSMENT — LOCATION ZONE DERM: LOCATION ZONE: HAND

## 2025-01-20 NOTE — PROCEDURE: MEDICATION COUNSELING
Rhofade Counseling: Rhofade is a topical medication which can decrease superficial blood flow where applied. Side effects are uncommon and include stinging, redness and allergic reactions.
Birth Control Pills Pregnancy And Lactation Text: This medication should be avoided if pregnant and for the first 30 days post-partum.
Glycopyrrolate Pregnancy And Lactation Text: This medication is Pregnancy Category B and is considered safe during pregnancy. It is unknown if it is excreted breast milk.
Litfulo Counseling: I discussed with the patient the risks of Litfulo therapy including but not limited to upper respiratory tract infections, shingles, cold sores, and nausea. Live vaccines should be avoided.  This medication has been linked to serious infections; higher rate of mortality; malignancy and lymphoproliferative disorders; major adverse cardiovascular events; thrombosis; gastrointestinal perforations; neutropenia; lymphopenia; anemia; liver enzyme elevations; and lipid elevations.
Vtama Pregnancy And Lactation Text: It is unknown if this medication can cause problems during pregnancy and breastfeeding.
Propranolol Pregnancy And Lactation Text: This medication is Pregnancy Category C and it isn't known if it is safe during pregnancy. It is excreted in breast milk.
Humira Counseling:  I discussed with the patient the risks of adalimumab including but not limited to myelosuppression, immunosuppression, autoimmune hepatitis, demyelinating diseases, lymphoma, and serious infections.  The patient understands that monitoring is required including a PPD at baseline and must alert us or the primary physician if symptoms of infection or other concerning signs are noted.
Aklief counseling:  Patient advised to apply a pea-sized amount only at bedtime and wait 30 minutes after washing their face before applying.  If too drying, patient may add a non-comedogenic moisturizer.  The most commonly reported side effects including irritation, redness, scaling, dryness, stinging, burning, itching, and increased risk of sunburn.  The patient verbalized understanding of the proper use and possible adverse effects of retinoids.  All of the patient's questions and concerns were addressed.
Odomzo Counseling- I discussed with the patient the risks of Odomzo including but not limited to nausea, vomiting, diarrhea, constipation, weight loss, changes in the sense of taste, decreased appetite, muscle spasms, and hair loss.  The patient verbalized understanding of the proper use and possible adverse effects of Odomzo.  All of the patient's questions and concerns were addressed.
Odomzo Pregnancy And Lactation Text: This medication is Pregnancy Category X and is absolutely contraindicated during pregnancy. It is unknown if it is excreted in breast milk.
Litfulo Pregnancy And Lactation Text: Based on animal studies, Lifulo may cause embryo-fetal harm when administered to pregnant women.  The medication should not be used in pregnancy.  Breastfeeding is not recommended during treatment.
Zoryve Counseling:  I discussed with the patient that Zoryve is not for use in the eyes, mouth or vagina. The most commonly reported side effects include diarrhea, headache, insomnia, application site pain, upper respiratory tract infections, and urinary tract infections.  All of the patient's questions and concerns were addressed.
Tremfya Counseling: I discussed with the patient the risks of guselkumab including but not limited to immunosuppression, serious infections, worsening of inflammatory bowel disease and drug reactions.  The patient understands that monitoring is required including a PPD at baseline and must alert us or the primary physician if symptoms of infection or other concerning signs are noted.
Hydroquinone Counseling:  Patient advised that medication may result in skin irritation, lightening (hypopigmentation), dryness, and burning.  In the event of skin irritation, the patient was advised to reduce the amount of the drug applied or use it less frequently.  Rarely, spots that are treated with hydroquinone can become darker (pseudoochronosis).  Should this occur, patient instructed to stop medication and call the office. The patient verbalized understanding of the proper use and possible adverse effects of hydroquinone.  All of the patient's questions and concerns were addressed.
Terbinafine Pregnancy And Lactation Text: This medication is Pregnancy Category B and is considered safe during pregnancy. It is also excreted in breast milk and breast feeding isn't recommended.
Cyclosporine Counseling:  I discussed with the patient the risks of cyclosporine including but not limited to hypertension, gingival hyperplasia,myelosuppression, immunosuppression, liver damage, kidney damage, neurotoxicity, lymphoma, and serious infections. The patient understands that monitoring is required including baseline blood pressure, CBC, CMP, lipid panel and uric acid, and then 1-2 times monthly CMP and blood pressure.
Spironolactone Counseling: Patient advised regarding risks of diarrhea, abdominal pain, hyperkalemia, birth defects (for female patients), liver toxicity and renal toxicity. The patient may need blood work to monitor liver and kidney function and potassium levels while on therapy. The patient verbalized understanding of the proper use and possible adverse effects of spironolactone.  All of the patient's questions and concerns were addressed.
Cantharidin Pregnancy And Lactation Text: This medication has not been proven safe during pregnancy. It is unknown if this medication is excreted in breast milk.
Hydroquinone Pregnancy And Lactation Text: This medication has not been assigned a Pregnancy Risk Category but animal studies failed to show danger with the topical medication. It is unknown if the medication is excreted in breast milk.
Hydroxychloroquine Counseling:  I discussed with the patient that a baseline ophthalmologic exam is needed at the start of therapy and every year thereafter while on therapy. A CBC may also be warranted for monitoring.  The side effects of this medication were discussed with the patient, including but not limited to agranulocytosis, aplastic anemia, seizures, rashes, retinopathy, and liver toxicity. Patient instructed to call the office should any adverse effect occur.  The patient verbalized understanding of the proper use and possible adverse effects of Plaquenil.  All the patient's questions and concerns were addressed.
SSKI Counseling:  I discussed with the patient the risks of SSKI including but not limited to thyroid abnormalities, metallic taste, GI upset, fever, headache, acne, arthralgias, paraesthesias, lymphadenopathy, easy bleeding, arrhythmias, and allergic reaction.
Detail Level: Simple
Acitretin Counseling:  I discussed with the patient the risks of acitretin including but not limited to hair loss, dry lips/skin/eyes, liver damage, hyperlipidemia, depression/suicidal ideation, photosensitivity.  Serious rare side effects can include but are not limited to pancreatitis, pseudotumor cerebri, bony changes, clot formation/stroke/heart attack.  Patient understands that alcohol is contraindicated since it can result in liver toxicity and significantly prolong the elimination of the drug by many years.
Aklief Pregnancy And Lactation Text: It is unknown if this medication is safe to use during pregnancy.  It is unknown if this medication is excreted in breast milk.  Breastfeeding women should use the topical cream on the smallest area of the skin for the shortest time needed while breastfeeding.  Do not apply to nipple and areola.
Rhofade Pregnancy And Lactation Text: This medication has not been assigned a Pregnancy Risk Category. It is unknown if the medication is excreted in breast milk.
Humira Pregnancy And Lactation Text: This medication is Pregnancy Category B and is considered safe during pregnancy. It is unknown if this medication is excreted in breast milk.
Tremfya Pregnancy And Lactation Text: The risk during pregnancy and breastfeeding is uncertain with this medication.
Olumiant Counseling: I discussed with the patient the risks of Olumiant therapy including but not limited to upper respiratory tract infections, shingles, cold sores, and nausea. Live vaccines should be avoided.  This medication has been linked to serious infections; higher rate of mortality; malignancy and lymphoproliferative disorders; major adverse cardiovascular events; thrombosis; gastrointestinal perforations; neutropenia; lymphopenia; anemia; liver enzyme elevations; and lipid elevations.
Solaraze Counseling:  I discussed with the patient the risks of Solaraze including but not limited to erythema, scaling, itching, weeping, crusting, and pain.
Sski Pregnancy And Lactation Text: This medication is Pregnancy Category D and isn't considered safe during pregnancy. It is excreted in breast milk.
Azelaic Acid Counseling: Patient counseled that medicine may cause skin irritation and to avoid applying near the eyes.  In the event of skin irritation, the patient was advised to reduce the amount of the drug applied or use it less frequently.   The patient verbalized understanding of the proper use and possible adverse effects of azelaic acid.  All of the patient's questions and concerns were addressed.
Include Pregnancy/Lactation Warning?: No
Cyclosporine Pregnancy And Lactation Text: This medication is Pregnancy Category C and it isn't know if it is safe during pregnancy. This medication is excreted in breast milk.
Hydroxychloroquine Pregnancy And Lactation Text: This medication has been shown to cause fetal harm but it isn't assigned a Pregnancy Risk Category. There are small amounts excreted in breast milk.
Spironolactone Pregnancy And Lactation Text: This medication can cause feminization of the male fetus and should be avoided during pregnancy. The active metabolite is also found in breast milk.
Imiquimod Counseling:  I discussed with the patient the risks of imiquimod including but not limited to erythema, scaling, itching, weeping, crusting, and pain.  Patient understands that the inflammatory response to imiquimod is variable from person to person and was educated regarded proper titration schedule.  If flu-like symptoms develop, patient knows to discontinue the medication and contact us.
Hyrimoz Counseling:  I discussed with the patient the risks of adalimumab including but not limited to myelosuppression, immunosuppression, autoimmune hepatitis, demyelinating diseases, lymphoma, and serious infections.  The patient understands that monitoring is required including a PPD at baseline and must alert us or the primary physician if symptoms of infection or other concerning signs are noted.
Acitretin Pregnancy And Lactation Text: This medication is Pregnancy Category X and should not be given to women who are pregnant or may become pregnant in the future. This medication is excreted in breast milk.
Solaraze Pregnancy And Lactation Text: This medication is Pregnancy Category B and is considered safe. There is some data to suggest avoiding during the third trimester. It is unknown if this medication is excreted in breast milk.
Azelaic Acid Pregnancy And Lactation Text: This medication is considered safe during pregnancy and breast feeding.
Olumiant Pregnancy And Lactation Text: Based on animal studies, Olumiant may cause embryo-fetal harm when administered to pregnant women.  The medication should not be used in pregnancy.  Breastfeeding is not recommended during treatment.
Thalidomide Counseling: I discussed with the patient the risks of thalidomide including but not limited to birth defects, anxiety, weakness, chest pain, dizziness, cough and severe allergy.
Xolair Counseling:  Patient informed of potential adverse effects including but not limited to fever, muscle aches, rash and allergic reactions.  The patient verbalized understanding of the proper use and possible adverse effects of Xolair.  All of the patient's questions and concerns were addressed.
Zyclara Counseling:  I discussed with the patient the risks of imiquimod including but not limited to erythema, scaling, itching, weeping, crusting, and pain.  Patient understands that the inflammatory response to imiquimod is variable from person to person and was educated regarded proper titration schedule.  If flu-like symptoms develop, patient knows to discontinue the medication and contact us.
Methotrexate Counseling:  Patient counseled regarding adverse effects of methotrexate including but not limited to nausea, vomiting, abnormalities in liver function tests. Patients may develop mouth sores, rash, diarrhea, and abnormalities in blood counts. The patient understands that monitoring is required including LFT's and blood counts.  There is a rare possibility of scarring of the liver and lung problems that can occur when taking methotrexate. Persistent nausea, loss of appetite, pale stools, dark urine, cough, and shortness of breath should be reported immediately. Patient advised to discontinue methotrexate treatment at least three months before attempting to become pregnant.  I discussed the need for folate supplements while taking methotrexate.  These supplements can decrease side effects during methotrexate treatment. The patient verbalized understanding of the proper use and possible adverse effects of methotrexate.  All of the patient's questions and concerns were addressed.
Doxycycline Pregnancy And Lactation Text: This medication is Pregnancy Category D and not consider safe during pregnancy. It is also excreted in breast milk but is considered safe for shorter treatment courses.
Methotrexate Pregnancy And Lactation Text: This medication is Pregnancy Category X and is known to cause fetal harm. This medication is excreted in breast milk.
Imiquimod Pregnancy And Lactation Text: This medication is Pregnancy Category C. It is unknown if this medication is excreted in breast milk.
Xolair Pregnancy And Lactation Text: This medication is Pregnancy Category B and is considered safe during pregnancy. This medication is excreted in breast milk.
Low Dose Naltrexone Counseling- I discussed with the patient the potential risks and side effects of low dose naltrexone including but not limited to: more vivid dreams, headaches, nausea, vomiting, abdominal pain, fatigue, dizziness, and anxiety.
Bexarotene Counseling:  I discussed with the patient the risks of bexarotene including but not limited to hair loss, dry lips/skin/eyes, liver abnormalities, hyperlipidemia, pancreatitis, depression/suicidal ideation, photosensitivity, drug rash/allergic reactions, hypothyroidism, anemia, leukopenia, infection, cataracts, and teratogenicity.  Patient understands that they will need regular blood tests to check lipid profile, liver function tests, white blood cell count, thyroid function tests and pregnancy test if applicable.
Adbry Counseling: I discussed with the patient the risks of tralokinumab including but not limited to eye infection and irritation, cold sores, injection site reactions, worsening of asthma, allergic reactions and increased risk of parasitic infection.  Live vaccines should be avoided while taking tralokinumab. The patient understands that monitoring is required and they must alert us or the primary physician if symptoms of infection or other concerning signs are noted.
Soolantra Counseling: I discussed with the patients the risks of topial Soolantra. This is a medicine which decreases the number of mites and inflammation in the skin. You experience burning, stinging, eye irritation or allergic reactions.  Please call our office if you develop any problems from using this medication.
Low Dose Naltrexone Pregnancy And Lactation Text: Naltrexone is pregnancy category C.  There have been no adequate and well-controlled studies in pregnant women.  It should be used in pregnancy only if the potential benefit justifies the potential risk to the fetus.   Limited data indicates that naltrexone is minimally excreted into breastmilk.
Bexarotene Pregnancy And Lactation Text: This medication is Pregnancy Category X and should not be given to women who are pregnant or may become pregnant. This medication should not be used if you are breast feeding.
Rinvoq Counseling: I discussed with the patient the risks of Rinvoq therapy including but not limited to upper respiratory tract infections, shingles, cold sores, bronchitis, nausea, cough, fever, acne, and headache. Live vaccines should be avoided.  This medication has been linked to serious infections; higher rate of mortality; malignancy and lymphoproliferative disorders; major adverse cardiovascular events; thrombosis; thrombocytopenia, anemia, and neutropenia; lipid elevations; liver enzyme elevations; and gastrointestinal perforations.
Erythromycin Counseling:  I discussed with the patient the risks of erythromycin including but not limited to GI upset, allergic reaction, drug rash, diarrhea, increase in liver enzymes, and yeast infections.
Benzoyl Peroxide Counseling: Patient counseled that medicine may cause skin irritation and bleach clothing.  In the event of skin irritation, the patient was advised to reduce the amount of the drug applied or use it less frequently.   The patient verbalized understanding of the proper use and possible adverse effects of benzoyl peroxide.  All of the patient's questions and concerns were addressed.
Ilumya Counseling: I discussed with the patient the risks of tildrakizumab including but not limited to immunosuppression, malignancy, posterior leukoencephalopathy syndrome, and serious infections.  The patient understands that monitoring is required including a PPD at baseline and must alert us or the primary physician if symptoms of infection or other concerning signs are noted.
Azathioprine Pregnancy And Lactation Text: This medication is Pregnancy Category D and isn't considered safe during pregnancy. It is unknown if this medication is excreted in breast milk.
High Dose Vitamin A Counseling: Side effects reviewed, pt to contact office should one occur.
Spevigo Pregnancy And Lactation Text: The risk during pregnancy and breastfeeding is uncertain with this medication. This medication does cross the placenta. It is unknown if this medication is found in breast milk.
Sarecycline Counseling: Patient advised regarding possible photosensitivity and discoloration of the teeth, skin, lips, tongue and gums.  Patient instructed to avoid sunlight, if possible.  When exposed to sunlight, patients should wear protective clothing, sunglasses, and sunscreen.  The patient was instructed to call the office immediately if the following severe adverse effects occur:  hearing changes, easy bruising/bleeding, severe headache, or vision changes.  The patient verbalized understanding of the proper use and possible adverse effects of sarecycline.  All of the patient's questions and concerns were addressed.
Dutasteride Pregnancy And Lactation Text: This medication is absolutely contraindicated in women, especially during pregnancy and breast feeding. Feminization of male fetuses is possible if taking while pregnant.
Dapsone Counseling: I discussed with the patient the risks of dapsone including but not limited to hemolytic anemia, agranulocytosis, rashes, methemoglobinemia, kidney failure, peripheral neuropathy, headaches, GI upset, and liver toxicity.  Patients who start dapsone require monitoring including baseline LFTs and weekly CBCs for the first month, then every month thereafter.  The patient verbalized understanding of the proper use and possible adverse effects of dapsone.  All of the patient's questions and concerns were addressed.
Itraconazole Counseling:  I discussed with the patient the risks of itraconazole including but not limited to liver damage, nausea/vomiting, neuropathy, and severe allergy.  The patient understands that this medication is best absorbed when taken with acidic beverages such as non-diet cola or ginger ale.  The patient understands that monitoring is required including baseline LFTs and repeat LFTs at intervals.  The patient understands that they are to contact us or the primary physician if concerning signs are noted.
Opioid Counseling: I discussed with the patient the potential side effects of opioids including but not limited to addiction, altered mental status, and depression. I stressed avoiding alcohol, benzodiazepines, muscle relaxants and sleep aids unless specifically okayed by a physician. The patient verbalized understanding of the proper use and possible adverse effects of opioids. All of the patient's questions and concerns were addressed. They were instructed to flush the remaining pills down the toilet if they did not need them for pain.
Protopic Counseling: Patient may experience a mild burning sensation during topical application. Protopic is not approved in children less than 2 years of age. There have been case reports of hematologic and skin malignancies in patients using topical calcineurin inhibitors although causality is questionable.
Itraconazole Pregnancy And Lactation Text: This medication is Pregnancy Category C and it isn't know if it is safe during pregnancy. It is also excreted in breast milk.
Finasteride Male Counseling: Finasteride Counseling:  I discussed with the patient the risks of use of finasteride including but not limited to decreased libido, decreased ejaculate volume, gynecomastia, and depression. Women should not handle medication.  All of the patient's questions and concerns were addressed.
Wartpeel Pregnancy And Lactation Text: This medication is Pregnancy Category X and contraindicated in pregnancy and in women who may become pregnant. It is unknown if this medication is excreted in breast milk.
Otezla Pregnancy And Lactation Text: This medication is Pregnancy Category C and it isn't known if it is safe during pregnancy. It is unknown if it is excreted in breast milk.
Wegovy Pregnancy And Lactation Text: The fetal risk of this medication is unknown and taking while pregnant is not recommended. It is unknown if this medication is present in breast milk.
Doxepin Pregnancy And Lactation Text: This medication is Pregnancy Category C and it isn't known if it is safe during pregnancy. It is also excreted in breast milk and breast feeding isn't recommended.
Ebglyss Counseling: I discussed with the patient the risks of lebrikizumab including but not limited to eye inflammation and irritation, cold sores, injection site reactions, allergic reactions and increased risk of parasitic infection. The patient understands that monitoring is required and they must alert us or the primary physician if symptoms of infection or other concerning signs are noted.
Erivedge Counseling- I discussed with the patient the risks of Erivedge including but not limited to nausea, vomiting, diarrhea, constipation, weight loss, changes in the sense of taste, decreased appetite, muscle spasms, and hair loss.  The patient verbalized understanding of the proper use and possible adverse effects of Erivedge.  All of the patient's questions and concerns were addressed.
Cellcept Counseling:  I discussed with the patient the risks of mycophenolate mofetil including but not limited to infection/immunosuppression, GI upset, hypokalemia, hypercholesterolemia, bone marrow suppression, lymphoproliferative disorders, malignancy, GI ulceration/bleed/perforation, colitis, interstitial lung disease, kidney failure, progressive multifocal leukoencephalopathy, and birth defects.  The patient understands that monitoring is required including a baseline creatinine and regular CBC testing. In addition, patient must alert us immediately if symptoms of infection or other concerning signs are noted.
High Dose Vitamin A Pregnancy And Lactation Text: High dose vitamin A therapy is contraindicated during pregnancy and breast feeding.
Winlevi Counseling:  I discussed with the patient the risks of topical clascoterone including but not limited to erythema, scaling, itching, and stinging. Patient voiced their understanding.
Ebglyss Pregnancy And Lactation Text: This medication likely crosses the placenta but the risk for the fetus is uncertain. It is unknown if this medication is excreted in breast milk.
Stelara Counseling:  I discussed with the patient the risks of ustekinumab including but not limited to immunosuppression, malignancy, posterior leukoencephalopathy syndrome, and serious infections.  The patient understands that monitoring is required including a PPD at baseline and must alert us or the primary physician if symptoms of infection or other concerning signs are noted.
Dapsone Pregnancy And Lactation Text: This medication is Pregnancy Category C and is not considered safe during pregnancy or breast feeding.
Elidel Counseling: Patient may experience a mild burning sensation during topical application. Elidel is not approved in children less than 2 years of age. There have been case reports of hematologic and skin malignancies in patients using topical calcineurin inhibitors although causality is questionable.
Finasteride Female Counseling: Finasteride Counseling:  I discussed with the patient the risks of use of finasteride including but not limited to decreased libido and sexual dysfunction. Explained the teratogenic nature of the medication and stressed the importance of not getting pregnant during treatment. All of the patient's questions and concerns were addressed.
Ketoconazole Counseling:   Patient counseled regarding improving absorption with orange juice.  Adverse effects include but are not limited to breast enlargement, headache, diarrhea, nausea, upset stomach, liver function test abnormalities, taste disturbance, and stomach pain.  There is a rare possibility of liver failure that can occur when taking ketoconazole. The patient understands that monitoring of LFTs may be required, especially at baseline. The patient verbalized understanding of the proper use and possible adverse effects of ketoconazole.  All of the patient's questions and concerns were addressed.
Gabapentin Counseling: I discussed with the patient the risks of gabapentin including but not limited to dizziness, somnolence, fatigue and ataxia.
Oxybutynin Counseling:  I discussed with the patient the risks of oxybutynin including but not limited to skin rash, drowsiness, dry mouth, difficulty urinating, and blurred vision.
Sarecycline Pregnancy And Lactation Text: This medication is Pregnancy Category D and not consider safe during pregnancy. It is also excreted in breast milk.
Hydroxyzine Counseling: Patient advised that the medication is sedating and not to drive a car after taking this medication.  Patient informed of potential adverse effects including but not limited to dry mouth, urinary retention, and blurry vision.  The patient verbalized understanding of the proper use and possible adverse effects of hydroxyzine.  All of the patient's questions and concerns were addressed.
Protopic Pregnancy And Lactation Text: This medication is Pregnancy Category C. It is unknown if this medication is excreted in breast milk when applied topically.
Zepbound Counseling: I reviewed the possible side effects including: thyroid tumors, kidney disease, gallbladder disease, abdominal pain, constipation, diarrhea, nausea, vomiting and pancreatitis. Do not take this medication if you have a history or family history of multiple endocrine neoplasia syndrome type 2. Side effects reviewed, pt to contact office should one occur.
Opioid Pregnancy And Lactation Text: These medications can lead to premature delivery and should be avoided during pregnancy. These medications are also present in breast milk in small amounts.
Tetracycline Counseling: Patient counseled regarding possible photosensitivity and increased risk for sunburn.  Patient instructed to avoid sunlight, if possible.  When exposed to sunlight, patients should wear protective clothing, sunglasses, and sunscreen.  The patient was instructed to call the office immediately if the following severe adverse effects occur:  hearing changes, easy bruising/bleeding, severe headache, or vision changes.  The patient verbalized understanding of the proper use and possible adverse effects of tetracycline.  All of the patient's questions and concerns were addressed. Patient understands to avoid pregnancy while on therapy due to potential birth defects.
Hydroxyzine Pregnancy And Lactation Text: This medication is not safe during pregnancy and should not be taken. It is also excreted in breast milk and breast feeding isn't recommended.
Qbrexza Counseling:  I discussed with the patient the risks of Qbrexza including but not limited to headache, mydriasis, blurred vision, dry eyes, nasal dryness, dry mouth, dry throat, dry skin, urinary hesitation, and constipation.  Local skin reactions including erythema, burning, stinging, and itching can also occur.
Cibinqo Counseling: I discussed with the patient the risks of Cibinqo therapy including but not limited to common cold, nausea, headache, cold sores, increased blood CPK levels, dizziness, UTIs, fatigue, acne, and vomitting. Live vaccines should be avoided.  This medication has been linked to serious infections; higher rate of mortality; malignancy and lymphoproliferative disorders; major adverse cardiovascular events; thrombosis; thrombocytopenia and lymphopenia; lipid elevations; and retinal detachment.
Oxybutynin Pregnancy And Lactation Text: This medication is Pregnancy Category B and is considered safe during pregnancy. It is unknown if it is excreted in breast milk.
Winlevi Pregnancy And Lactation Text: This medication is considered safe during pregnancy and breastfeeding.
Ketoconazole Pregnancy And Lactation Text: This medication is Pregnancy Category C and it isn't know if it is safe during pregnancy. It is also excreted in breast milk and breast feeding isn't recommended.
Finasteride Pregnancy And Lactation Text: This medication is absolutely contraindicated during pregnancy. It is unknown if it is excreted in breast milk.
Eucrisa Counseling: Patient may experience a mild burning sensation during topical application. Eucrisa is not approved in children less than 2 years of age.
Enbrel Counseling:  I discussed with the patient the risks of etanercept including but not limited to myelosuppression, immunosuppression, autoimmune hepatitis, demyelinating diseases, lymphoma, and infections.  The patient understands that monitoring is required including a PPD at baseline and must alert us or the primary physician if symptoms of infection or other concerning signs are noted.
Libtayo Counseling- I discussed with the patient the risks of Libtayo including but not limited to nausea, vomiting, diarrhea, and bone or muscle pain.  The patient verbalized understanding of the proper use and possible adverse effects of Libtayo.  All of the patient's questions and concerns were addressed.
Gabapentin Pregnancy And Lactation Text: This medication is Pregnancy Category C and isn't considered safe during pregnancy. It is excreted in breast milk.
Libtayo Pregnancy And Lactation Text: This medication is contraindicated in pregnancy and when breast feeding.
Cibinqo Pregnancy And Lactation Text: It is unknown if this medication will adversely affect pregnancy or breast feeding.  You should not take this medication if you are currently pregnant or planning a pregnancy or while breastfeeding.
Propranolol Counseling:  I discussed with the patient the risks of propranolol including but not limited to low heart rate, low blood pressure, low blood sugar, restlessness and increased cold sensitivity. They should call the office if they experience any of these side effects.
Taltz Counseling: I discussed with the patient the risks of ixekizumab including but not limited to immunosuppression, serious infections, worsening of inflammatory bowel disease and drug reactions.  The patient understands that monitoring is required including a PPD at baseline and must alert us or the primary physician if symptoms of infection or other concerning signs are noted.
VTAMA Counseling: I discussed with the patient that VTAMA is not for use in the eyes, mouth or mouth. They should call the office if they develop any signs of allergic reactions to VTAMA. The patient verbalized understanding of the proper use and possible adverse effects of VTAMA.  All of the patient's questions and concerns were addressed.
Cyclophosphamide Counseling:  I discussed with the patient the risks of cyclophosphamide including but not limited to hair loss, hormonal abnormalities, decreased fertility, abdominal pain, diarrhea, nausea and vomiting, bone marrow suppression and infection. The patient understands that monitoring is required while taking this medication.
Cyclophosphamide Pregnancy And Lactation Text: This medication is Pregnancy Category D and it isn't considered safe during pregnancy. This medication is excreted in breast milk.
Terbinafine Counseling: Patient counseling regarding adverse effects of terbinafine including but not limited to headache, diarrhea, rash, upset stomach, liver function test abnormalities, itching, taste/smell disturbance, nausea, abdominal pain, and flatulence.  There is a rare possibility of liver failure that can occur when taking terbinafine.  The patient understands that a baseline LFT and kidney function test may be required. The patient verbalized understanding of the proper use and possible adverse effects of terbinafine.  All of the patient's questions and concerns were addressed.
Birth Control Pills Counseling: Birth Control Pill Counseling: I discussed with the patient the potential side effects of OCPs including but not limited to increased risk of stroke, heart attack, thrombophlebitis, deep venous thrombosis, hepatic adenomas, breast changes, GI upset, headaches, and depression.  The patient verbalized understanding of the proper use and possible adverse effects of OCPs. All of the patient's questions and concerns were addressed.
Glycopyrrolate Counseling:  I discussed with the patient the risks of glycopyrrolate including but not limited to skin rash, drowsiness, dry mouth, difficulty urinating, and blurred vision.
Qbrexza Pregnancy And Lactation Text: There is no available data on Qbrexza use in pregnant women.  There is no available data on Qbrexza use in lactation.
Saxenda Counseling: I reviewed the possible side effects including: thyroid tumors, kidney disease, gallbladder disease, abdominal pain, constipation, diarrhea, nausea, vomiting and pancreatitis. Do not take this medication if you have a history or family history of multiple endocrine neoplasia syndrome type 2. Side effects reviewed, pt to contact office should one occur.
Simponi Counseling:  I discussed with the patient the risks of golimumab including but not limited to myelosuppression, immunosuppression, autoimmune hepatitis, demyelinating diseases, lymphoma, and serious infections.  The patient understands that monitoring is required including a PPD at baseline and must alert us or the primary physician if symptoms of infection or other concerning signs are noted.
Cephalexin Counseling: I counseled the patient regarding use of cephalexin as an antibiotic for prophylactic and/or therapeutic purposes. Cephalexin (commonly prescribed under brand name Keflex) is a cephalosporin antibiotic which is active against numerous classes of bacteria, including most skin bacteria. Side effects may include nausea, diarrhea, gastrointestinal upset, rash, hives, yeast infections, and in rare cases, hepatitis, kidney disease, seizures, fever, confusion, neurologic symptoms, and others. Patients with severe allergies to penicillin medications are cautioned that there is about a 10% incidence of cross-reactivity with cephalosporins. When possible, patients with penicillin allergies should use alternatives to cephalosporins for antibiotic therapy.
Cimzia Pregnancy And Lactation Text: This medication crosses the placenta but can be considered safe in certain situations. Cimzia may be excreted in breast milk.
Nemluvio Pregnancy And Lactation Text: It is not known if Nemluvio causes fetal harm or is present in breast milk. Please proceed with caution if patients who are pregnant or breastfeeding.
Olanzapine Counseling- I discussed with the patient the common side effects of olanzapine including but are not limited to: lack of energy, dry mouth, increased appetite, sleepiness, tremor, constipation, dizziness, changes in behavior, or restlessness.  Explained that teenagers are more likely to experience headaches, abdominal pain, pain in the arms or legs, tiredness, and sleepiness.  Serious side effects include but are not limited: increased risk of death in elderly patients who are confused, have memory loss, or dementia-related psychosis; hyperglycemia; increased cholesterol and triglycerides; and weight gain.
Topical Steroids Counseling: I discussed with the patient that prolonged use of topical steroids can result in the increased appearance of superficial blood vessels (telangiectasias), lightening (hypopigmentation) and thinning of the skin (atrophy).  Patient understands to avoid using high potency steroids in skin folds, the groin or the face.  The patient verbalized understanding of the proper use and possible adverse effects of topical steroids.  All of the patient's questions and concerns were addressed.
Tazorac Pregnancy And Lactation Text: This medication is not safe during pregnancy. It is unknown if this medication is excreted in breast milk.
Rituxan Counseling:  I discussed with the patient the risks of Rituxan infusions. Side effects can include infusion reactions, severe drug rashes including mucocutaneous reactions, reactivation of latent hepatitis and other infections and rarely progressive multifocal leukoencephalopathy.  All of the patient's questions and concerns were addressed.
Cantharidin Counseling:  I discussed with the patient the risks of Cantharidin including but not limited to pain, redness, burning, itching, and blistering.
Clofazimine Counseling:  I discussed with the patient the risks of clofazimine including but not limited to skin and eye pigmentation, liver damage, nausea/vomiting, gastrointestinal bleeding and allergy.
Quinolones Counseling:  I discussed with the patient the risks of fluoroquinolones including but not limited to GI upset, allergic reaction, drug rash, diarrhea, dizziness, photosensitivity, yeast infections, liver function test abnormalities, tendonitis/tendon rupture.
Fluconazole Counseling:  Patient counseled regarding adverse effects of fluconazole including but not limited to headache, diarrhea, nausea, upset stomach, liver function test abnormalities, taste disturbance, and stomach pain.  There is a rare possibility of liver failure that can occur when taking fluconazole.  The patient understands that monitoring of LFTs and kidney function test may be required, especially at baseline. The patient verbalized understanding of the proper use and possible adverse effects of fluconazole.  All of the patient's questions and concerns were addressed.
Albendazole Pregnancy And Lactation Text: This medication is Pregnancy Category C and it isn't known if it is safe during pregnancy. It is also excreted in breast milk.
Opzelura Counseling:  I discussed with the patient the risks of Opzelura including but not limited to nasopharngitis, bronchitis, ear infection, eosinophila, hives, diarrhea, folliculitis, tonsillitis, and rhinorrhea.  Taken orally, this medication has been linked to serious infections; higher rate of mortality; malignancy and lymphoproliferative disorders; major adverse cardiovascular events; thrombosis; thrombocytopenia, anemia, and neutropenia; and lipid elevations.
Olanzapine Pregnancy And Lactation Text: This medication is pregnancy category C.   There are no adequate and well controlled trials with olanzapine in pregnant females.  Olanzapine should be used during pregnancy only if the potential benefit justifies the potential risk to the fetus.   In a study in lactating healthy women, olanzapine was excreted in breast milk.  It is recommended that women taking olanzapine should not breast feed.
Cephalexin Pregnancy And Lactation Text: This medication is Pregnancy Category B and considered safe during pregnancy.  It is also excreted in breast milk but can be used safely for shorter doses.
Topical Steroids Applications Pregnancy And Lactation Text: Most topical steroids are considered safe to use during pregnancy and lactation.  Any topical steroid applied to the breast or nipple should be washed off before breastfeeding.
Topical Clindamycin Counseling: Patient counseled that this medication may cause skin irritation or allergic reactions.  In the event of skin irritation, the patient was advised to reduce the amount of the drug applied or use it less frequently.   The patient verbalized understanding of the proper use and possible adverse effects of clindamycin.  All of the patient's questions and concerns were addressed.
Cosentyx Counseling:  I discussed with the patient the risks of Cosentyx including but not limited to worsening of Crohn's disease, immunosuppression, allergic reactions and infections.  The patient understands that monitoring is required including a PPD at baseline and must alert us or the primary physician if symptoms of infection or other concerning signs are noted.
Rituxan Pregnancy And Lactation Text: This medication is Pregnancy Category C and it isn't know if it is safe during pregnancy. It is unknown if this medication is excreted in breast milk but similar antibodies are known to be excreted.
5-Fu Counseling: 5-Fluorouracil Counseling:  I discussed with the patient the risks of 5-fluorouracil including but not limited to erythema, scaling, itching, weeping, crusting, and pain.
Cimetidine Counseling:  I discussed with the patient the risks of Cimetidine including but not limited to gynecomastia, headache, diarrhea, nausea, drowsiness, arrhythmias, pancreatitis, skin rashes, psychosis, bone marrow suppression and kidney toxicity.
Clindamycin Counseling: I counseled the patient regarding use of clindamycin as an antibiotic for prophylactic and/or therapeutic purposes. Clindamycin is active against numerous classes of bacteria, including skin bacteria. Side effects may include nausea, diarrhea, gastrointestinal upset, rash, hives, yeast infections, and in rare cases, colitis.
Topical Sulfur Applications Counseling: Topical Sulfur Counseling: Patient counseled that this medication may cause skin irritation or allergic reactions.  In the event of skin irritation, the patient was advised to reduce the amount of the drug applied or use it less frequently.   The patient verbalized understanding of the proper use and possible adverse effects of topical sulfur application.  All of the patient's questions and concerns were addressed.
Skyrizi Counseling: I discussed with the patient the risks of risankizumab-rzaa including but not limited to immunosuppression, and serious infections.  The patient understands that monitoring is required including a PPD at baseline and must alert us or the primary physician if symptoms of infection or other concerning signs are noted.
Ivermectin Counseling:  Patient instructed to take medication on an empty stomach with a full glass of water.  Patient informed of potential adverse effects including but not limited to nausea, diarrhea, dizziness, itching, and swelling of the extremities or lymph nodes.  The patient verbalized understanding of the proper use and possible adverse effects of ivermectin.  All of the patient's questions and concerns were addressed.
Griseofulvin Counseling:  I discussed with the patient the risks of griseofulvin including but not limited to photosensitivity, cytopenia, liver damage, nausea/vomiting and severe allergy.  The patient understands that this medication is best absorbed when taken with a fatty meal (e.g., ice cream or french fries).
Colchicine Counseling:  Patient counseled regarding adverse effects including but not limited to stomach upset (nausea, vomiting, stomach pain, or diarrhea).  Patient instructed to limit alcohol consumption while taking this medication.  Colchicine may reduce blood counts especially with prolonged use.  The patient understands that monitoring of kidney function and blood counts may be required, especially at baseline. The patient verbalized understanding of the proper use and possible adverse effects of colchicine.  All of the patient's questions and concerns were addressed.
Dutasteride Male Counseling: Dustasteride Counseling:  I discussed with the patient the risks of use of dutasteride including but not limited to decreased libido, decreased ejaculate volume, and gynecomastia. Women who can become pregnant should not handle medication.  All of the patient's questions and concerns were addressed.
Oral Minoxidil Counseling- I discussed with the patient the risks of oral minoxidil including but not limited to shortness of breath, swelling of the feet or ankles, dizziness, lightheadedness, unwanted hair growth and allergic reaction.  The patient verbalized understanding of the proper use and possible adverse effects of oral minoxidil.  All of the patient's questions and concerns were addressed.
Semaglutide Counseling: I reviewed the possible side effects including: thyroid tumors, kidney disease, gallbladder disease, abdominal pain, constipation, diarrhea, nausea, vomiting and pancreatitis. Do not take this medication if you have a history or family history of multiple endocrine neoplasia syndrome type 2. Side effects reviewed, pt to contact office should one occur.
Opzelura Pregnancy And Lactation Text: There is insufficient data to evaluate drug-associated risk for major birth defects, miscarriage, or other adverse maternal or fetal outcomes.  There is a pregnancy registry that monitors pregnancy outcomes in pregnant persons exposed to the medication during pregnancy.  It is unknown if this medication is excreted in breast milk.  Do not breastfeed during treatment and for about 4 weeks after the last dose.
Dupixent Counseling: I discussed with the patient the risks of dupilumab including but not limited to eye infection and irritation, cold sores, injection site reactions, worsening of asthma, allergic reactions and increased risk of parasitic infection.  Live vaccines should be avoided while taking dupilumab. Dupilumab will also interact with certain medications such as warfarin and cyclosporine. The patient understands that monitoring is required and they must alert us or the primary physician if symptoms of infection or other concerning signs are noted.
Clindamycin Pregnancy And Lactation Text: This medication can be used in pregnancy if certain situations. Clindamycin is also present in breast milk.
Oral Minoxidil Pregnancy And Lactation Text: This medication should only be used when clearly needed if you are pregnant, attempting to become pregnant or breast feeding.
Rifampin Counseling: I discussed with the patient the risks of rifampin including but not limited to liver damage, kidney damage, red-orange body fluids, nausea/vomiting and severe allergy.
Topical Sulfur Applications Pregnancy And Lactation Text: This medication is Pregnancy Category C and has an unknown safety profile during pregnancy. It is unknown if this topical medication is excreted in breast milk.
Isotretinoin Counseling: Patient should get monthly blood tests, not donate blood, not drive at night if vision affected, not share medication, and not undergo elective surgery for 6 months after tx completed. Side effects reviewed, pt to contact office should one occur.
Siliq Counseling:  I discussed with the patient the risks of Siliq including but not limited to new or worsening depression, suicidal thoughts and behavior, immunosuppression, malignancy, posterior leukoencephalopathy syndrome, and serious infections.  The patient understands that monitoring is required including a PPD at baseline and must alert us or the primary physician if symptoms of infection or other concerning signs are noted. There is also a special program designed to monitor depression which is required with Siliq.
Griseofulvin Pregnancy And Lactation Text: This medication is Pregnancy Category X and is known to cause serious birth defects. It is unknown if this medication is excreted in breast milk but breast feeding should be avoided.
Dutasteride Female Counseling: Dutasteride Counseling:  I discussed with the patient the risks of use of dutasteride including but not limited to decreased libido and sexual dysfunction. Explained the teratogenic nature of the medication and stressed the importance of not getting pregnant during treatment. All of the patient's questions and concerns were addressed.
Drysol Counseling:  I discussed with the patient the risks of drysol/aluminum chloride including but not limited to skin rash, itching, irritation, burning.
Picato Counseling:  I discussed with the patient the risks of Picato including but not limited to erythema, scaling, itching, weeping, crusting, and pain.
Wegovy Counseling: I reviewed the possible side effects including: thyroid tumors, kidney disease, gallbladder disease, abdominal pain, constipation, diarrhea, nausea, vomiting and pancreatitis. Do not take this medication if you have a history or family history of multiple endocrine neoplasia syndrome type 2. Side effects reviewed, pt to contact office should one occur.
Doxycycline Counseling:  Patient counseled regarding possible photosensitivity and increased risk for sunburn.  Patient instructed to avoid sunlight, if possible.  When exposed to sunlight, patients should wear protective clothing, sunglasses, and sunscreen.  The patient was instructed to call the office immediately if the following severe adverse effects occur:  hearing changes, easy bruising/bleeding, severe headache, or vision changes.  The patient verbalized understanding of the proper use and possible adverse effects of doxycycline.  All of the patient's questions and concerns were addressed.
Otezla Counseling: The side effects of Otezla were discussed with the patient, including but not limited to worsening or new depression, weight loss, diarrhea, nausea, upper respiratory tract infection, and headache. Patient instructed to call the office should any adverse effect occur.  The patient verbalized understanding of the proper use and possible adverse effects of Otezla.  All the patient's questions and concerns were addressed.
Dupixent Pregnancy And Lactation Text: This medication likely crosses the placenta but the risk for the fetus is uncertain. This medication is excreted in breast milk.
Spevigo Counseling: I discussed with the patient the risks of Spevigo including but not limited to fatigue, nasuea, vomiting, headache, pruritus, urinary tract infection, an infusion related reactions.  The patient understands that monitoring is required including screening for tuberculosis at baseline and yearly screening thereafter while continuing Spevigo therapy. They should contact us if symptoms of infection or other concerning signs are noted.
Isotretinoin Pregnancy And Lactation Text: This medication is Pregnancy Category X and is considered extremely dangerous during pregnancy. It is unknown if it is excreted in breast milk.
Azathioprine Counseling:  I discussed with the patient the risks of azathioprine including but not limited to myelosuppression, immunosuppression, hepatotoxicity, lymphoma, and infections.  The patient understands that monitoring is required including baseline LFTs, Creatinine, possible TPMP genotyping and weekly CBCs for the first month and then every 2 weeks thereafter.  The patient verbalized understanding of the proper use and possible adverse effects of azathioprine.  All of the patient's questions and concerns were addressed.
Rifampin Pregnancy And Lactation Text: This medication is Pregnancy Category C and it isn't know if it is safe during pregnancy. It is also excreted in breast milk and should not be used if you are breast feeding.
Wartpeel Counseling:  I discussed with the patient the risks of Wartpeel including but not limited to erythema, scaling, itching, weeping, crusting, and pain.
Doxepin Counseling:  Patient advised that the medication is sedating and not to drive a car after taking this medication. Patient informed of potential adverse effects including but not limited to dry mouth, urinary retention, and blurry vision.  The patient verbalized understanding of the proper use and possible adverse effects of doxepin.  All of the patient's questions and concerns were addressed.
Rinvoq Pregnancy And Lactation Text: Based on animal studies, Rinvoq may cause embryo-fetal harm when administered to pregnant women.  The medication should not be used in pregnancy.  Breastfeeding is not recommended during treatment and for 6 days after the last dose.
Erythromycin Pregnancy And Lactation Text: This medication is Pregnancy Category B and is considered safe during pregnancy. It is also excreted in breast milk.
Benzoyl Peroxide Pregnancy And Lactation Text: This medication is Pregnancy Category C. It is unknown if benzoyl peroxide is excreted in breast milk.
Prednisone Counseling:  I discussed with the patient the risks of prolonged use of prednisone including but not limited to weight gain, insomnia, osteoporosis, mood changes, diabetes, susceptibility to infection, glaucoma and high blood pressure.  In cases where prednisone use is prolonged, patients should be monitored with blood pressure checks, serum glucose levels and an eye exam.  Additionally, the patient may need to be placed on GI prophylaxis, PCP prophylaxis, and calcium and vitamin D supplementation and/or a bisphosphonate.  The patient verbalized understanding of the proper use and the possible adverse effects of prednisone.  All of the patient's questions and concerns were addressed.
Klisyri Counseling:  I discussed with the patient the risks of Klisyri including but not limited to erythema, scaling, itching, weeping, crusting, and pain.
Azithromycin Counseling:  I discussed with the patient the risks of azithromycin including but not limited to GI upset, allergic reaction, drug rash, diarrhea, and yeast infections.
Klisyri Pregnancy And Lactation Text: It is unknown if this medication can harm a developing fetus or if it is excreted in breast milk.
Niacinamide Counseling: I recommended taking niacin or niacinamide, also know as vitamin B3, twice daily. Recent evidence suggests that taking vitamin B3 (500 mg twice daily) can reduce the risk of actinic keratoses and non-melanoma skin cancers. Side effects of vitamin B3 include flushing and headache.
Tranexamic Acid Counseling:  Patient advised of the small risk of bleeding problems with tranexamic acid. They were also instructed to call if they developed any nausea, vomiting or diarrhea. All of the patient's questions and concerns were addressed.
Adbry Pregnancy And Lactation Text: It is unknown if this medication will adversely affect pregnancy or breast feeding.
Topical Ketoconazole Counseling: Patient counseled that this medication may cause skin irritation or allergic reactions.  In the event of skin irritation, the patient was advised to reduce the amount of the drug applied or use it less frequently.   The patient verbalized understanding of the proper use and possible adverse effects of ketoconazole.  All of the patient's questions and concerns were addressed.
Soolantra Pregnancy And Lactation Text: This medication is Pregnancy Category C. This medication is considered safe during breast feeding.
Sotyktu Counseling:  I discussed the most common side effects of Sotyktu including: common cold, sore throat, sinus infections, cold sores, canker sores, folliculitis, and acne.? I also discussed more serious side effects of Sotyktu including but not limited to: serious allergic reactions; increased risk for infections such as TB; cancers such as lymphomas; rhabdomyolysis and elevated CPK; and elevated triglycerides and liver enzymes.?
Tranexamic Acid Pregnancy And Lactation Text: It is unknown if this medication is safe during pregnancy or breast feeding.
Topical Retinoid counseling:  Patient advised to apply a pea-sized amount only at bedtime and wait 30 minutes after washing their face before applying.  If too drying, patient may add a non-comedogenic moisturizer. The patient verbalized understanding of the proper use and possible adverse effects of retinoids.  All of the patient's questions and concerns were addressed.
Carac Counseling:  I discussed with the patient the risks of Carac including but not limited to erythema, scaling, itching, weeping, crusting, and pain.
Metronidazole Counseling:  I discussed with the patient the risks of metronidazole including but not limited to seizures, nausea/vomiting, a metallic taste in the mouth, nausea/vomiting and severe allergy.
Minoxidil Counseling: Minoxidil is a topical medication which can increase blood flow where it is applied. It is uncertain how this medication increases hair growth. Side effects are uncommon and include stinging and allergic reactions.
Niacinamide Pregnancy And Lactation Text: These medications are considered safe during pregnancy.
Azithromycin Pregnancy And Lactation Text: This medication is considered safe during pregnancy and is also secreted in breast milk.
Infliximab Counseling:  I discussed with the patient the risks of infliximab including but not limited to myelosuppression, immunosuppression, autoimmune hepatitis, demyelinating diseases, lymphoma, and serious infections.  The patient understands that monitoring is required including a PPD at baseline and must alert us or the primary physician if symptoms of infection or other concerning signs are noted.
Bimzelx Counseling:  I discussed with the patient the risks of Bimzelx including but not limited to depression, immunosuppression, allergic reactions and infections.  The patient understands that monitoring is required including a PPD at baseline and must alert us or the primary physician if symptoms of infection or other concerning signs are noted.
Metronidazole Pregnancy And Lactation Text: This medication is Pregnancy Category B and considered safe during pregnancy.  It is also excreted in breast milk.
Sotyktu Pregnancy And Lactation Text: There is insufficient data to evaluate whether or not Sotyktu is safe to use during pregnancy.? ?It is not known if Sotyktu passes into breast milk and whether or not it is safe to use when breastfeeding.??
Valtrex Counseling: I discussed with the patient the risks of valacyclovir including but not limited to kidney damage, nausea, vomiting and severe allergy.  The patient understands that if the infection seems to be worsening or is not improving, they are to call.
Topical Metronidazole Counseling: Metronidazole is a topical antibiotic medication. You may experience burning, stinging, redness, or allergic reactions.  Please call our office if you develop any problems from using this medication.
Bactrim Counseling:  I discussed with the patient the risks of sulfa antibiotics including but not limited to GI upset, allergic reaction, drug rash, diarrhea, dizziness, photosensitivity, and yeast infections.  Rarely, more serious reactions can occur including but not limited to aplastic anemia, agranulocytosis, methemoglobinemia, blood dyscrasias, liver or kidney failure, lung infiltrates or desquamative/blistering drug rashes.
Bimzelx Pregnancy And Lactation Text: This medication crosses the placenta and the safety is uncertain during pregnancy. It is unknown if this medication is present in breast milk.
Simlandi Counseling:  I discussed with the patient the risks of adalimumab including but not limited to myelosuppression, immunosuppression, autoimmune hepatitis, demyelinating diseases, lymphoma, and serious infections.  The patient understands that monitoring is required including a PPD at baseline and must alert us or the primary physician if symptoms of infection or other concerning signs are noted.
Arava Counseling:  Patient counseled regarding adverse effects of Arava including but not limited to nausea, vomiting, abnormalities in liver function tests. Patients may develop mouth sores, rash, diarrhea, and abnormalities in blood counts. The patient understands that monitoring is required including LFTs and blood counts.  There is a rare possibility of scarring of the liver and lung problems that can occur when taking methotrexate. Persistent nausea, loss of appetite, pale stools, dark urine, cough, and shortness of breath should be reported immediately. Patient advised to discontinue Arava treatment and consult with a physician prior to attempting conception. The patient will have to undergo a treatment to eliminate Arava from the body prior to conception.
Nsaids Counseling: NSAID Counseling: I discussed with the patient that NSAIDs should be taken with food. Prolonged use of NSAIDs can result in the development of stomach ulcers.  Patient advised to stop taking NSAIDs if abdominal pain occurs.  The patient verbalized understanding of the proper use and possible adverse effects of NSAIDs.  All of the patient's questions and concerns were addressed.
Ozempic Counseling: I reviewed the possible side effects including: thyroid tumors, kidney disease, gallbladder disease, abdominal pain, constipation, diarrhea, nausea, vomiting and pancreatitis. Do not take this medication if you have a history or family history of multiple endocrine neoplasia syndrome type 2. Side effects reviewed, pt to contact office should one occur.
Cimzia Counseling:  I discussed with the patient the risks of Cimzia including but not limited to immunosuppression, allergic reactions and infections.  The patient understands that monitoring is required including a PPD at baseline and must alert us or the primary physician if symptoms of infection or other concerning signs are noted.
Bactrim Pregnancy And Lactation Text: This medication is Pregnancy Category D and is known to cause fetal risk.  It is also excreted in breast milk.
Nsaids Pregnancy And Lactation Text: These medications are considered safe up to 30 weeks gestation. It is excreted in breast milk.
Topical Metronidazole Pregnancy And Lactation Text: This medication is Pregnancy Category B and considered safe during pregnancy.  It is also considered safe to use while breastfeeding.
Valtrex Pregnancy And Lactation Text: this medication is Pregnancy Category B and is considered safe during pregnancy. This medication is not directly found in breast milk but it's metabolite acyclovir is present.
Minocycline Counseling: Patient advised regarding possible photosensitivity and discoloration of the teeth, skin, lips, tongue and gums.  Patient instructed to avoid sunlight, if possible.  When exposed to sunlight, patients should wear protective clothing, sunglasses, and sunscreen.  The patient was instructed to call the office immediately if the following severe adverse effects occur:  hearing changes, easy bruising/bleeding, severe headache, or vision changes.  The patient verbalized understanding of the proper use and possible adverse effects of minocycline.  All of the patient's questions and concerns were addressed.
Calcipotriene Counseling:  I discussed with the patient the risks of calcipotriene including but not limited to erythema, scaling, itching, and irritation.
Tazorac Counseling:  Patient advised that medication is irritating and drying.  Patient may need to apply sparingly and wash off after an hour before eventually leaving it on overnight.  The patient verbalized understanding of the proper use and possible adverse effects of tazorac.  All of the patient's questions and concerns were addressed.
Nemluvio Counseling: I discussed with the patient the risks of nemolizumab including but not limited to headache, gastrointestinal complaints, nasopharyngitis, musculoskeletal complaints, injection site reactions, and allergic reactions. The patient understands that monitoring is required and they must alert us or the primary physician if any side effects are noted.
Xeljanz Counseling: I discussed with the patient the risks of Xeljanz therapy including increased risk of infection, liver issues, headache, diarrhea, or cold symptoms. Live vaccines should be avoided. They were instructed to call if they have any problems.
Albendazole Counseling:  I discussed with the patient the risks of albendazole including but not limited to cytopenia, kidney damage, nausea/vomiting and severe allergy.  The patient understands that this medication is being used in an off-label manner.
Xeljamiez Pregnancy And Lactation Text: This medication is Pregnancy Category D and is not considered safe during pregnancy.  The risk during breast feeding is also uncertain.
Calcipotriene Pregnancy And Lactation Text: The use of this medication during pregnancy or lactation is not recommended as there is insufficient data.
Mirvaso Counseling: Mirvaso is a topical medication which can decrease superficial blood flow where applied. Side effects are uncommon and include stinging, redness and allergic reactions.

## 2025-03-11 NOTE — PROGRESS NOTES
"Bariatric Medicine Follow Up  Chief Complaint   Patient presents with   • Weight Gain       History of Present Illness:   Jhonatan Fonseca is a 59 y.o. male who presents for weight management follow-up and to help address co-morbidities caused by overweight, as below.    During the patient's last visit, the following were discussed and recommended:  Weight Goal: 3-5% wt loss each month (pt goal weight is <225 lb, then below 200)  Diet: Continue tracking with my fitness pal as he enjoys doing  <1800 kcal per day, strive for 100 g CHO per day and increasing protein intake slightly  Physical Activity: Resume resistance exercise, swimming  Focus on adding resistance to avoid loss in skeletal muscle mass with weight loss  Risk level for moderate/vigorous exercise program: Moderate given recent abdominal surgery  New Rx: None per patient request  Behavior change: Gradually increase activity level as above    Portions reduced but still eating too many CHO for snacks.  Tracking well.  He continues to log all of his intake.  His main complaint is that he is getting very hungry later in the day and knows it is because he is eating too many carbohydrate rich snacks.  He wonders what else to eat.  He is not currently in the mood for raw vegetables, trying to get back to eating them but has difficulty.    HTN: Well-controlled on amlodipine  DK: Sleep stable  HLD: Lipids controlled with statin  IFG: Fasting glucose diet controlled, with last A1c normal 10/2019    Exercise:   Has resumed workouts or with grandson     Review of Systems   Denies lightheadedness, worsening fatigue.  Some cravings especially at night  All other ROS were reviewed and are otherwise unchanged from my previous visit with patient.    Physical Exam:    /74 (BP Location: Left arm, Patient Position: Sitting, BP Cuff Size: Large adult)   Pulse 61   Ht 1.778 m (5' 10\")   Wt 115.3 kg (254 lb 1.6 oz)   SpO2 96%   BMI 36.46 kg/m²   Waist Measurement " amphetamine-dextroamphetamine (ADDERALL) 30 MG tablet   Last refill 1/31/2025 60 X 0  Last office visit with PA 2/8/2025  No scheduled appointment okay to fill?    Pt is requesting a 3MO supply (with dates postponed)  Okay to fill?  [id number] - [place], [place]   Pt will call the pharmacy later.       Waist: 48.5 inch/inches  Weight change since last visit: -5.5 lb (-27 lb total)  Waist Circum change since last visit: -2 in (-5 in total)    Constitutional: Oriented to person, place, and time and well-developed, well-nourished, and in no distress.    Head: Normocephalic.   Musculoskeletal: Normal range of motion. No edema.   Neurological: Alert and oriented to person, place, and time. No muscle weakness.  Gait normal.   Skin: Warm and dry. Not diaphoretic.   Psychiatric: Mood, memory, affect and judgment normal.     Laboratory:   Recent labs reviewed.      Dietitian Assessment: Not seeing RD    ASSESSMENT/PLAN:  Body mass index is 36.46 kg/m².    Obesity Stage ([place]): 2; Class 2    1. BMI 40.0-44.9, adult ([facility])     2. DK (obstructive sleep apnea)     3. Essential hypertension     4. Pure hypercholesterolemia     5. Chronic fatigue     6. Hyperglycemia       The patient is doing great with his tracking, much more mindful about food choices, continues to lose in waist and weight and fasting glucose well controlled without glucose lowering agent.  Needs to continue reducing refined carbohydrate intake, increasing activity level.  Blood pressure, lipids controlled, fatigue improved.  Sleep stable.    The patient and I have discussed at length and agree to the following recommendations, which are all addressing the above diagnoses:    Weight Goal: 3-5% wt loss each month (pt goal weight is 225 lb, low 200 by 6/2020)  Diet: Continue tracking as he is doing with my fitness pal and charts  Tracking around 1500 kcal per day, continue reducing CHO towards 100 g/day from 150  Snack lists given  Increase vegetables when tolerated  Physical Activity: Goal is 10,000 steps per day  New Rx: Wants to consider anti-obesity medication pending course if hunger and cravings increase  Behavior change: Increase activity level, keep tracking  Follow-up: 6 wks    Patient's body mass index is 36.46 kg/m². Exercise and nutrition  counseling were performed at this visit.

## 2025-04-14 ENCOUNTER — APPOINTMENT (OUTPATIENT)
Dept: URBAN - METROPOLITAN AREA CLINIC 15 | Facility: CLINIC | Age: 65
Setting detail: DERMATOLOGY
End: 2025-04-14

## (undated) DEVICE — PACK MAJOR BASIN - (2EA/CA)

## (undated) DEVICE — SET EXTENSION WITH 2 PORTS (48EA/CA) ***PART #2C8610 IS A SUBSTITUTE*****

## (undated) DEVICE — SPONGE KITTNER DISSECTORS - (5EA/PK 50PK/CA)

## (undated) DEVICE — GOWN SURGEONS LARGE - (32/CA)

## (undated) DEVICE — STAPLER SKIN DISP - (6/BX 10BX/CA) VISISTAT

## (undated) DEVICE — KIT ANESTHESIA W/CIRCUIT & 3/LT BAG W/FILTER (20EA/CA)

## (undated) DEVICE — SUTURE 3-0 PROLENE SH 36 (36PK/BX)"

## (undated) DEVICE — DRESSING POST OP BORDER 4INX8IN AG (25/CA)

## (undated) DEVICE — CLIP MED LG INTNL HRZN TI ESCP - (20/BX)

## (undated) DEVICE — BLADE SURGICAL CLIPPER - (50EA/CA)

## (undated) DEVICE — GOWN SURGEONS X-LARGE - DISP. (30/CA)

## (undated) DEVICE — MASK ANESTHESIA ADULT  - (100/CA)

## (undated) DEVICE — SUTURE CV

## (undated) DEVICE — KIT ROOM DECONTAMINATION

## (undated) DEVICE — STOPCOCK 3-WAY W/SWIVEL LEVER LOCK (50EA/CA)

## (undated) DEVICE — SUTURE 2-0 SILK SH (36PK/BX)

## (undated) DEVICE — SYRINGE 10 ML CONTROL LL (25EA/BX 4BX/CA)

## (undated) DEVICE — SET BIFURCATED BLOOD - (48EA/CS)

## (undated) DEVICE — TUBE E-T HI-LO CUFF 8.0MM (10EA/PK)

## (undated) DEVICE — GLOVE BIOGEL SZ 6.5 SURGICAL PF LTX (50PR/BX 4BX/CA)

## (undated) DEVICE — BLADE SURGICAL #11 - (50/BX)

## (undated) DEVICE — DRAPE IOBAN II 23 IN X 33 IN - (10/BX)

## (undated) DEVICE — SUTURE GENERAL

## (undated) DEVICE — GLOVE SZ 6 BIOGEL PI MICRO - PF LF (50PR/BX 4BX/CA)

## (undated) DEVICE — BLADE SURGICAL #15 - (50/BX 3BX/CA)

## (undated) DEVICE — HEAD HOLDER JUNIOR/ADULT

## (undated) DEVICE — DRAPE LARGE 3 QUARTER - (20/CA)

## (undated) DEVICE — GLOVE BIOGEL SZ 6 PF LATEX - (50EA/BX 4BX/CA)

## (undated) DEVICE — PAD LAP STERILE 18 X 18 - (5/PK 40PK/CA)

## (undated) DEVICE — FILTER BLOOD TRANSFUSION - (40/CA) (PALL)

## (undated) DEVICE — KIT GEL-FLOW NT ABORBABLE GELATIN (6EA/BX)

## (undated) DEVICE — DRAPE MAYO STAND - (30/CA)

## (undated) DEVICE — CHLORAPREP 26 ML APPLICATOR - ORANGE TINT(25/CA)

## (undated) DEVICE — TUBE NG SALEM SUMP 16FR (50EA/CA)

## (undated) DEVICE — NEPTUNE 4 PORT MANIFOLD - (20/PK)

## (undated) DEVICE — SUTURE 0 ETHIBOND CT-1 - (12/BX) 18 INCH

## (undated) DEVICE — TRANSDUCER ADULT DISP. SINGLE BONDED STERILE - (20EA/CA)

## (undated) DEVICE — SET LEADWIRE 5 LEAD BEDSIDE DISPOSABLE ECG (1SET OF 5/EA)

## (undated) DEVICE — VESSEL DIVIDER SEAL LAP LIGASURE 37CM (6EA/BX)

## (undated) DEVICE — SUTURE 4-0 30CM STRATAFIX SPIRAL PS-2 (12EA/BX)

## (undated) DEVICE — SET FLUID WARMING STANDARD FLOW - (10/CA)

## (undated) DEVICE — TRAY MULTI-LUMEN 7FR PRESSURE W/MAX BARRIER AND BIOPATCH - (5/CA)

## (undated) DEVICE — LACTATED RINGERS INJ 1000 ML - (14EA/CA 60CA/PF)

## (undated) DEVICE — SHEET CARDIOVASCULAR - (4/CA)

## (undated) DEVICE — SENSOR SPO2 NEO LNCS ADHESIVE (20/BX) SEE USER NOTES

## (undated) DEVICE — ELECTRODE 850 FOAM ADHESIVE - HYDROGEL RADIOTRNSPRNT (50/PK)

## (undated) DEVICE — SPONGE GAUZESTER 4 X 4 4PLY - (128PK/CA)

## (undated) DEVICE — SYRINGE 30 ML LL (56/BX)

## (undated) DEVICE — SUCTION INSTRUMENT YANKAUER BULBOUS TIP W/O VENT (50EA/CA)

## (undated) DEVICE — SOD. CHL. INJ. 0.9% 1000 ML - (14EA/CA 60CA/PF)

## (undated) DEVICE — SURGIFOAM (SIZE 100) - (6EA/CA)

## (undated) DEVICE — CLIP MED INTNL HRZN TI ESCP - (25/BX)

## (undated) DEVICE — ELECTRODE DUAL RETURN W/ CORD - (50/PK)

## (undated) DEVICE — DRESSING LEUKOMED STERILE 11.75X4IN - (50/CA)

## (undated) DEVICE — DISH PETRI STERILE (50EA/CA)

## (undated) DEVICE — TUBING CLEARLINK DUO-VENT - C-FLO (48EA/CA)

## (undated) DEVICE — SUTURE 3-0 SILK 12 X 18 IN - (36/BX)

## (undated) DEVICE — SUCTION TIP STRAIGHT ARGYLE - 50EA/CA

## (undated) DEVICE — SUTURE 1 VICRYL PLUS CTX - 36 INCH (36/BX)

## (undated) DEVICE — GLOVE BIOGEL PI ORTHO SZ 6 SURGICAL PF LF (40PR/BX)

## (undated) DEVICE — SUTURE 3-0 VICRYL PLUS SH - 8X 18 INCH (12/BX)

## (undated) DEVICE — CLIP SM INTNL HRZN TI ESCP LGT - (24EA/PK 25PK/BX)

## (undated) DEVICE — RETRACTOR O C SECTION LRY - (5/BX)

## (undated) DEVICE — SLEEVE, VASO, REPROC, LARGE

## (undated) DEVICE — VESSELOOP MINI BLUE STERILE - SURG-I-LOOP (10EA/BX)

## (undated) DEVICE — VESSELOOP MAXI BLUE STERILE- SURG-I-LOOP (10EA/BX)

## (undated) DEVICE — DRAPE IOBAN II INCISE 23X17 - (10EA/BX 4BX/CA)

## (undated) DEVICE — PROTECTOR ULNA NERVE - (36PR/CA)

## (undated) DEVICE — KIT RADIAL ARTERY 20GA W/MAX BARRIER AND BIOPATCH  (5EA/CA) #10740 IS FOR THE SET RADIAL ARTERIAL

## (undated) DEVICE — GOWN WARMING STANDARD FLEX - (30/CA)

## (undated) DEVICE — BOVIE  BLADE 6 EXTENDED - (50/PK)

## (undated) DEVICE — SYRINGE SAFETY 10 ML 18 GA X 1 1/2 BLUNT LL (100/BX 4BX/CA)

## (undated) DEVICE — SODIUM CHL IRRIGATION 0.9% 1000ML (12EA/CA)

## (undated) DEVICE — DRESSING SURGICAL NUKNIT 6X9 (10EA/BX)

## (undated) DEVICE — SUTURE 4-0 PROLENE RB-1 D/A 36 (36PK/BX)"

## (undated) DEVICE — PTFE PLED STER - (250/CA)

## (undated) DEVICE — SUTURE 2-0 SILK 12 X 18" (36PK/BX)"

## (undated) DEVICE — TRAY CATHETER FOLEY URINE METER W/STATLOCK 350ML (10EA/CA)

## (undated) DEVICE — GLOVE BIOGEL PI INDICATOR SZ 6.5 SURGICAL PF LF - (50/BX 4BX/CA)

## (undated) DEVICE — CANISTER SUCTION 3000ML MECHANICAL FILTER AUTO SHUTOFF MEDI-VAC NONSTERILE LF DISP  (40EA/CA)

## (undated) DEVICE — GLOVE BIOGEL SZ 7.5 SURGICAL PF LTX - (50PR/BX 4BX/CA)

## (undated) DEVICE — SLEEVE, VASO, THIGH, MED

## (undated) DEVICE — SUTURE 2-0 VICRYL PLUS CT-1 36 (36PK/BX)"

## (undated) DEVICE — SUTURE 0 SILK TIES (36PK/BX)

## (undated) DEVICE — FIBRILLAR SURGICEL 4X4 - 10/CA

## (undated) DEVICE — POLY UMBILICAL TAPE 1/8X30 - (36/BX)

## (undated) DEVICE — BAG RESUSCITATION DISPOSABLE - WITH MASK (10 EA/CA)

## (undated) DEVICE — SUTURE 1 SILK 2 X 60 (36PK/BX)

## (undated) DEVICE — CLIP LG INTNL HRZN TI ESCP LGT - (20/BX)

## (undated) DEVICE — KIT INTROPERCUTANEOUS SHEATH - 8.5 FR W/MAX BARRIER AND BIOPATCH  (5/CA)

## (undated) DEVICE — GOWN WARMING X-LARGE FLEX - (20/CA)

## (undated) DEVICE — TUBE CONNECT SUCTION CLEAR 120 X 1/4" (50EA/CA)"

## (undated) DEVICE — DRAPE LAPAROTOMY T SHEET - (12EA/CA)

## (undated) DEVICE — SPONGE RADIOPAQUE CTN X-LG - STERILE (50PK/CA) MADE TO ORDER ITEM AND HAS A 4-6 WEEK LEAD TIME

## (undated) DEVICE — SUTURE 4-0 PROLENE SH 36 (36PK/BX)"

## (undated) DEVICE — SODIUM CHL. INJ. 0.9% 500ML (24EA/CA 50CA/PF)

## (undated) DEVICE — TOWELS CLOTH SURGICAL - (4/PK 20PK/CA)

## (undated) DEVICE — BAG DECANTER (50EA/CS)

## (undated) DEVICE — GLOVE BIOGEL SZ 7 SURGICAL PF LTX - (50PR/BX 4BX/CA)

## (undated) DEVICE — GLOVE BIOGEL SZ 8 SURGICAL PF LTX - (50PR/BX 4BX/CA)

## (undated) DEVICE — VESSELOOP SUPER MAXI BLUE - SURG-I-LOOP (2EA/PK 10PK/BX)